# Patient Record
Sex: FEMALE | Race: BLACK OR AFRICAN AMERICAN | NOT HISPANIC OR LATINO | Employment: UNEMPLOYED | URBAN - METROPOLITAN AREA
[De-identification: names, ages, dates, MRNs, and addresses within clinical notes are randomized per-mention and may not be internally consistent; named-entity substitution may affect disease eponyms.]

---

## 2017-01-11 ENCOUNTER — TELEPHONE (OUTPATIENT)
Dept: FAMILY MEDICINE | Facility: CLINIC | Age: 52
End: 2017-01-11

## 2017-01-11 NOTE — TELEPHONE ENCOUNTER
----- Message from Karla Sargent MA sent at 1/11/2017  3:28 PM CST -----  Contact: [phone] / self       ----- Message -----     From: Lilo Laura     Sent: 1/11/2017  12:38 PM       To: Mindy Crane Staff    Patient requesting to speak with you regarding having synthroid moved from tier 3 to tier 2 copay.    [employer]: [phone]    Member Id [id number]    Please advise.

## 2017-02-06 DIAGNOSIS — E03.9 HYPOTHYROIDISM: ICD-10-CM

## 2017-02-06 RX ORDER — LEVOTHYROXINE SODIUM 150 MCG
TABLET ORAL
Qty: 90 TABLET | Refills: 0 | Status: SHIPPED | OUTPATIENT
Start: 2017-02-06 | End: 2017-06-04 | Stop reason: SDUPTHER

## 2017-02-15 ENCOUNTER — TELEPHONE (OUTPATIENT)
Dept: FAMILY MEDICINE | Facility: CLINIC | Age: 52
End: 2017-02-15

## 2017-02-15 NOTE — TELEPHONE ENCOUNTER
----- Message from Jolie Roger sent at 2/15/2017  4:11 PM CST -----  Contact: Self [phone]  Patient is calling to talk to nurse concerning her medication. Please advice

## 2017-02-15 NOTE — TELEPHONE ENCOUNTER
The patient stated the synthroid wasn't covered. I called the insurance company and they stated she picked it up 02/06/2017 with a 3 month supply. Called Jenny [phone]. Called patient back to inform her. She stated she didn't pick it up. I advised her to call them.

## 2017-02-15 NOTE — TELEPHONE ENCOUNTER
----- Message from Jolie Roger sent at 2/15/2017  4:11 PM CST -----  Contact: Self [phone]  Patient is calling to talk to nurse concerning her medication. Please advice

## 2017-02-15 NOTE — TELEPHONE ENCOUNTER
Returned patients call. Patient stated her insurance will not cover the synthroid. It needs a prior authorization. I informed her that I will call them.       Jenny: [phone]     Member Id [id number]    I called the insurance company

## 2017-02-16 ENCOUNTER — TELEPHONE (OUTPATIENT)
Dept: FAMILY MEDICINE | Facility: CLINIC | Age: 52
End: 2017-02-16

## 2017-02-16 NOTE — TELEPHONE ENCOUNTER
Returned patient's call.  She stated she called her insurance. She was told That I need to call back the insurance tiering section for synthroid to lower the price.   Member Id [id number]  [phone]    Spoke with the insurance company. She said she is trying to get the copay approved to $40.00,. It has been approved for 36 months. Its too soon to be filled again this month. Try and contact members services to be reimbursed.    Called patient to inform her of the change. Patient verbalized understanding

## 2017-02-16 NOTE — TELEPHONE ENCOUNTER
----- Message from Jolie Roger sent at 2/15/2017  4:52 PM CST -----  Contact: Self [phone]  Patient Returning Your Phone Call

## 2017-03-29 ENCOUNTER — TELEPHONE (OUTPATIENT)
Dept: FAMILY MEDICINE | Facility: CLINIC | Age: 52
End: 2017-03-29

## 2017-03-29 NOTE — TELEPHONE ENCOUNTER
Patient states she missed lab appointment, when rescheduling patient there was no orders. Patient states she getting labs for A1C and thyroid.

## 2017-03-29 NOTE — TELEPHONE ENCOUNTER
----- Message from Miriam Madrid sent at 3/29/2017  2:44 PM CDT -----  Contact: [phone]  Called patient to confirm appointment on tomorrow , says missed lab appointment , would like to go get them done tomorrow morning,please call her #[phone].Thanks

## 2017-03-30 ENCOUNTER — OFFICE VISIT (OUTPATIENT)
Dept: FAMILY MEDICINE | Facility: CLINIC | Age: 52
End: 2017-03-30
Payer: COMMERCIAL

## 2017-03-30 VITALS
OXYGEN SATURATION: 99 % | SYSTOLIC BLOOD PRESSURE: 132 MMHG | RESPIRATION RATE: 18 BRPM | DIASTOLIC BLOOD PRESSURE: 72 MMHG | BODY MASS INDEX: 54.47 KG/M2 | WEIGHT: 293 LBS | TEMPERATURE: 98 F | HEART RATE: 71 BPM

## 2017-03-30 DIAGNOSIS — Z00.00 ANNUAL PHYSICAL EXAM: Primary | ICD-10-CM

## 2017-03-30 DIAGNOSIS — M15.9 PRIMARY OSTEOARTHRITIS INVOLVING MULTIPLE JOINTS: ICD-10-CM

## 2017-03-30 DIAGNOSIS — E03.9 HYPOTHYROIDISM, UNSPECIFIED TYPE: ICD-10-CM

## 2017-03-30 PROCEDURE — 1160F RVW MEDS BY RX/DR IN RCRD: CPT | Mod: S$GLB,,, | Performed by: FAMILY MEDICINE

## 2017-03-30 PROCEDURE — 99999 PR PBB SHADOW E&M-EST. PATIENT-LVL III: CPT | Mod: PBBFAC,,, | Performed by: FAMILY MEDICINE

## 2017-03-30 PROCEDURE — 99214 OFFICE O/P EST MOD 30 MIN: CPT | Mod: S$GLB,,, | Performed by: FAMILY MEDICINE
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
## 2017-03-30 NOTE — MR AVS SNAPSHOT
[facility]  [address]  [place] [address]  Phone: [phone]  Fax: [phone]                  Radha Mike   3/30/2017 1:20 PM   Office Visit    Description:  Female : 1965   Provider:  Melanie Orlando MD   Department:  [facility]           Reason for Visit     Establish Care           Diagnoses this Visit        Comments    Annual physical exam    -  Primary     BMI 50.0-59.9, adult         Hypothyroidism, unspecified type         Primary osteoarthritis involving multiple joints                To Do List           Future Appointments        Provider Department Dept Phone    2017 7:40 AM LAB, [place] [facility] [phone]    2017 8:00 AM [facility] XR1 [facility] [phone]    2017 1:20 PM Melanie Orlando MD [facility] [phone]      Goals (5 Years of Data)     None      [facility]     [facility] Nurse Care Line -  Assistance  Unless otherwise directed by your provider, please contact [facility], our nurse care line that is available for  assistance.     Registered nurses in the [facility] provide: appointment scheduling, clinical advisement, health education, and other advisory services.  Call: [phone] (toll free)               Medications           Message regarding Medications     Verify the changes and/or additions to your medication regime listed below are the same as discussed with your clinician today.  If any of these changes or additions are incorrect, please notify your healthcare provider.             Verify that the below list of medications is an accurate representation of the medications you are currently taking.  If none reported, the list may be blank. If incorrect, please contact your healthcare provider. Carry this list with you in case of emergency.           Current Medications     levothyroxine (SYNTHROID) 150  MCG tablet Take 1 tablet (150 mcg total) by mouth once daily.    naproxen sodium (ANAPROX) 220 MG tablet Take 220 mg by mouth every 12 (twelve) hours.    SYNTHROID 150 mcg tablet TAKE 1 TABLET BY MOUTH EVERY DAY           Clinical Reference Information           Your Vitals Were     BP Pulse Temp Resp Weight Last Period    132/72 (BP Location: Right arm, Patient Position: Sitting, BP Method: Manual) 71 97.7 °F (36.5 °C) (Oral) 18 162.5 kg (358 lb 4 oz) 03/30/2017    SpO2 BMI             99% 54.47 kg/m2         Blood Pressure          Most Recent Value    BP  132/72      Allergies as of 3/30/2017     Codeine    Oxycodone-acetaminophen      Immunizations Administered on Date of Encounter - 3/30/2017     None      Orders Placed During Today's Visit      Normal Orders This Visit    POCT Urinalysis     Future Labs/Procedures Expected by Expires    CBC auto differential  3/30/2017 5/29/2018    Comprehensive metabolic panel  3/30/2017 5/29/2018    Lipid panel  3/30/2017 5/29/2018    TSH  3/30/2017 5/29/2018    Vitamin D  3/30/2017 5/29/2018    X-Ray Lumbar Spine AP And Lateral  3/30/2017 3/30/2018      Language Assistance Services     ATTENTION: Language assistance services are available, free of charge. Please call [phone].      ATENCIÓN: Si habla анна, tiene a kathleen disposición servicios gratuitos de asistencia lingüística. Llame al [phone].     CHÚ Ý: N?u b?n nói Ti?ng Vi?t, có các d?ch v? h? tr? ngôn ng? mi?n phí dành cho b?n. G?i s? [phone].         [facility] complies with applicable Federal civil rights laws and does not discriminate on the basis of race, color, national origin, age, disability, or sex.

## 2017-03-30 NOTE — TELEPHONE ENCOUNTER
----- Message from Sarah Meraz sent at 3/30/2017  8:24 AM CDT -----  Contact: [phone]  Patient states she is at [facility] waiting for orders, patient states she spoke with you on 03-29-17

## 2017-04-06 ENCOUNTER — HOSPITAL ENCOUNTER (OUTPATIENT)
Dept: RADIOLOGY | Facility: HOSPITAL | Age: 52
Discharge: HOME | End: 2017-04-06
Attending: FAMILY MEDICINE
Payer: COMMERCIAL

## 2017-04-06 DIAGNOSIS — M15.9 PRIMARY OSTEOARTHRITIS INVOLVING MULTIPLE JOINTS: ICD-10-CM

## 2017-04-06 DIAGNOSIS — Z00.00 ANNUAL PHYSICAL EXAM: ICD-10-CM

## 2017-04-06 PROCEDURE — 72100 X-RAY EXAM L-S SPINE 2/3 VWS: CPT | Mod: TC,PO

## 2017-04-13 ENCOUNTER — OFFICE VISIT (OUTPATIENT)
Dept: FAMILY MEDICINE | Facility: CLINIC | Age: 52
End: 2017-04-13
Payer: COMMERCIAL

## 2017-04-13 VITALS
HEART RATE: 72 BPM | DIASTOLIC BLOOD PRESSURE: 78 MMHG | SYSTOLIC BLOOD PRESSURE: 140 MMHG | RESPIRATION RATE: 20 BRPM | BODY MASS INDEX: 44.41 KG/M2 | HEIGHT: 68 IN | WEIGHT: 293 LBS | TEMPERATURE: 98 F

## 2017-04-13 DIAGNOSIS — N92.1 MENORRHAGIA WITH IRREGULAR CYCLE: ICD-10-CM

## 2017-04-13 DIAGNOSIS — E03.9 HYPOTHYROIDISM, UNSPECIFIED TYPE: ICD-10-CM

## 2017-04-13 DIAGNOSIS — E55.9 VITAMIN D DEFICIENCY: ICD-10-CM

## 2017-04-13 DIAGNOSIS — D50.0 IRON DEFICIENCY ANEMIA DUE TO CHRONIC BLOOD LOSS: Primary | ICD-10-CM

## 2017-04-13 PROCEDURE — 99999 PR PBB SHADOW E&M-EST. PATIENT-LVL III: CPT | Mod: PBBFAC,,, | Performed by: FAMILY MEDICINE

## 2017-04-13 PROCEDURE — 99214 OFFICE O/P EST MOD 30 MIN: CPT | Mod: S$GLB,,, | Performed by: FAMILY MEDICINE

## 2017-04-13 PROCEDURE — 1160F RVW MEDS BY RX/DR IN RCRD: CPT | Mod: S$GLB,,, | Performed by: FAMILY MEDICINE

## 2017-04-13 RX ORDER — ERGOCALCIFEROL 1.25 MG/1
50000 CAPSULE ORAL
Qty: 8 CAPSULE | Refills: 0 | Status: SHIPPED | OUTPATIENT
Start: 2017-04-13 | End: 2017-06-05 | Stop reason: SDUPTHER

## 2017-04-13 NOTE — MR AVS SNAPSHOT
[facility]  [address]  [place] [address]  Phone: [phone]  Fax: [phone]                  Radha Mike   2017 1:20 PM   Office Visit    Description:  Female : 1965   Provider:  Melanie Orlando MD   Department:  [facility]           Reason for Visit     Follow-up           Diagnoses this Visit        Comments    Iron deficiency anemia due to chronic blood loss    -  Primary     Hypothyroidism, unspecified type         BMI 50.0-59.9, adult         Vitamin D deficiency                To Do List           Future Appointments        Provider Department Dept Phone    2017 10:45 AM Vivi Hackett MD [facility] [phone]    2017 1:00 PM Melanie Orlando MD [place] [phone]      Goals (5 Years of Data)     None       These Medications        Disp Refills Start End    ferrous gluconate (FERGON) 325 MG Tab 90 tablet 2 2017     Take 1 tablet (325 mg total) by mouth 3 (three) times daily with meals. - Oral    Pharmacy: [facility] [address] [place] [address] [facility] Ph #: [phone]       ergocalciferol (ERGOCALCIFEROL) 50,000 unit Cap 8 capsule 0 2017    Take 1 capsule (50,000 Units total) by mouth every 7 days. - Oral    Pharmacy: [facility] [address] [place] [address] [facility] Ph #: [phone]         [facility]     [facility] Care [facility] -  Assistance  Unless otherwise directed by your provider, please contact [facility], our nurse care line that is available for  assistance.     Registered nurses in the [facility] provide: appointment scheduling, clinical advisement, health education, and other advisory services.  Call: [phone] (toll free)               Medications           Message regarding Medications     Verify the changes and/or  "additions to your medication regime listed below are the same as discussed with your clinician today.  If any of these changes or additions are incorrect, please notify your healthcare provider.        START taking these NEW medications        Refills    ferrous gluconate (FERGON) 325 MG Tab 2    Sig: Take 1 tablet (325 mg total) by mouth 3 (three) times daily with meals.    Class: Normal    Route: Oral    ergocalciferol (ERGOCALCIFEROL) 50,000 unit Cap 0    Sig: Take 1 capsule (50,000 Units total) by mouth every 7 days.    Class: Normal    Route: Oral      STOP taking these medications     naproxen sodium (ANAPROX) 220 MG tablet Take 220 mg by mouth every 12 (twelve) hours.           Verify that the below list of medications is an accurate representation of the medications you are currently taking.  If none reported, the list may be blank. If incorrect, please contact your healthcare provider. Carry this list with you in case of emergency.           Current Medications     ergocalciferol (ERGOCALCIFEROL) 50,000 unit Cap Take 1 capsule (50,000 Units total) by mouth every 7 days.    ferrous gluconate (FERGON) 325 MG Tab Take 1 tablet (325 mg total) by mouth 3 (three) times daily with meals.    SYNTHROID 150 mcg tablet TAKE 1 TABLET BY MOUTH EVERY DAY           Clinical Reference Information           Your Vitals Were     BP Pulse Temp Resp Height Weight    140/78 72 98.3 °F (36.8 °C) 20 5' 8" (1.727 m) 163 kg (359 lb 5.6 oz)    Last Period BMI             03/30/2017 54.64 kg/m2         Blood Pressure          Most Recent Value    BP  (!)  140/78      Allergies as of 4/13/2017     Codeine    Oxycodone-acetaminophen      Immunizations Administered on Date of Encounter - 4/13/2017     None      Orders Placed During Today's Visit      Normal Orders This Visit    Ambulatory Referral to Obstetrics / Gynecology       Language Assistance Services     ATTENTION: Language assistance services are available, free of charge. " Please call [phone].      ATENCIÓN: Si habla español, tiene a kathleen disposición servicios gratuitos de asistencia lingüística. Llame al [phone].     CHÚ Ý: N?u b?n nói Ti?ng Vi?t, có các d?ch v? h? tr? ngôn ng? mi?n phí dành cho b?n. G?i s? [phone].         [facility] complies with applicable Federal civil rights laws and does not discriminate on the basis of race, color, national origin, age, disability, or sex.

## 2017-04-13 NOTE — PROGRESS NOTES
"HPI:  Radha Mike is a 51 y.o. year old female that  presents with for f/u of test result.She reports that she has had vaginal bleeding daily for a least one year.She had it evaluated  And was determined not to be cancer but no defined treatment was started.  Chief Complaint   Patient presents with    Follow-up     Here for test results.   .     HPI      Past Medical History:   Diagnosis Date    Arthritis     Psoriasis     Thyroid disease      Social History     Social History    Marital status:      Spouse name: N/A    Number of children: N/A    Years of education: N/A     Occupational History     [employer]     Social History Main Topics    Smoking status: Never Smoker    Smokeless tobacco: Not on file    Alcohol use Yes      Comment: occasional    Drug use: No    Sexual activity: Yes     Partners: Male     Birth control/ protection: Surgical     Other Topics Concern    Not on file     Social History Narrative    No narrative on file     Past Surgical History:   Procedure Laterality Date    ANKLE DEBRIDEMENT  2005    CHOLECYSTECTOMY      GALLBLADDER SURGERY  1988    TUBAL LIGATION  1996     Family History   Problem Relation Age of Onset    Diabetes Mother     Breast cancer Mother     Heart attack Mother     Heart disease Mother      CHF    Diabetes Father     Cancer Father      mouth    Stomach cancer Father     Arthritis Sister     No Known Problems Brother     No Known Problems Daughter     Hypothyroidism Son     Diabetes Sister            Review of Systems  General ROS: negative for chills, fever or weight loss  ENT ROS: negative for epistaxis, sore throat or rhinorrhea  Respiratory ROS: no cough, shortness of breath, or wheezing  Cardiovascular ROS: no chest pain or dyspnea on exertion  Gastrointestinal ROS: no abdominal pain, change in bowel habits, or black/ bloody stools    Physical Exam:  BP (!) 140/78  Pulse 72  Temp 98.3 °F (36.8 °C)  Resp 20  Ht 5' 8" (1.727 m) "  Wt (!) 163 kg (359 lb 5.6 oz)  LMP 03/30/2017  BMI 54.64 kg/m2  General appearance: alert, cooperative, no distress  Constitutional:Oriented to person, place, and time.appears well-developed and well-nourished.  HEENT: Normocephalic, atraumatic, neck symmetrical, no nasal discharge, TM- clear bilaterally  Lungs: clear to auscultation bilaterally, no dullness to percussion bilaterally  Heart: regular rate and rhythm without rub; no displacement of the PMI , S1&S2 present  Abdomen: soft, non-tender; bowel sounds normoactive; no organomegaly  Physical Exam    LABS:    Complete Blood Count  Lab Results   Component Value Date    RBC 5.06 04/06/2017    HGB 11.3 (L) 04/06/2017    HCT 35.9 (L) 04/06/2017    MCV 71 (L) 04/06/2017    MCH 22.3 (L) 04/06/2017    MCHC 31.5 (L) 04/06/2017    RDW 17.4 (H) 04/06/2017     04/06/2017    MPV 12.0 04/06/2017    GRAN 6.3 04/06/2017    GRAN 60.6 04/06/2017    LYMPH 2.9 04/06/2017    LYMPH 27.4 04/06/2017    MONO 0.9 04/06/2017    MONO 8.3 04/06/2017    EOS 0.3 04/06/2017    BASO 0.03 04/06/2017    EOSINOPHIL 3.1 04/06/2017    BASOPHIL 0.3 04/06/2017    DIFFMETHOD Automated 04/06/2017       Comprehensive Metabolic Panel  Lab Results   Component Value Date     04/06/2017    BUN 14 04/06/2017    CREATININE 0.82 04/06/2017     04/06/2017    K 4.0 04/06/2017     04/06/2017    PROT 7.8 04/06/2017    ALBUMIN 4.1 04/06/2017    BILITOT 0.6 04/06/2017    AST 19 04/06/2017    ALKPHOS 83 04/06/2017    CO2 29 04/06/2017    ALT 21 04/06/2017    ANIONGAP 10 04/06/2017    EGFRNONAA >60.0 04/06/2017    ESTGFRAFRICA >60.0 04/06/2017       LIPID  No components found for: LIPID PROFILE    TSH  Lab Results   Component Value Date    TSH 1.372 04/06/2017         Assessment:    ICD-10-CM ICD-9-CM    1. Iron deficiency anemia due to chronic blood loss D50.0 280.0 ferrous gluconate (FERGON) 325 MG Tab      Ambulatory Referral to Obstetrics / Gynecology   2. Hypothyroidism,  unspecified type E03.9 244.9    3. BMI 50.0-59.9, adult Z68.43 V85.43    4. Vitamin D deficiency E55.9 268.9 ergocalciferol (ERGOCALCIFEROL) 50,000 unit Cap   5. Menorrhagia with irregular cycle N92.1 626.2 Ambulatory Referral to Obstetrics / Gynecology         Plan:    Return in 6 weeks (on 5/25/2017).          Melanie Orlando MD

## 2017-05-11 ENCOUNTER — OFFICE VISIT (OUTPATIENT)
Dept: OBSTETRICS AND GYNECOLOGY | Facility: CLINIC | Age: 52
End: 2017-05-11
Payer: COMMERCIAL

## 2017-05-11 VITALS
BODY MASS INDEX: 44.41 KG/M2 | SYSTOLIC BLOOD PRESSURE: 120 MMHG | HEIGHT: 68 IN | WEIGHT: 293 LBS | DIASTOLIC BLOOD PRESSURE: 70 MMHG

## 2017-05-11 DIAGNOSIS — N88.8 CERVICAL MASS: ICD-10-CM

## 2017-05-11 DIAGNOSIS — N93.9 ABNORMAL UTERINE BLEEDING (AUB): Primary | ICD-10-CM

## 2017-05-11 DIAGNOSIS — Z12.4 SCREENING FOR CERVICAL CANCER: ICD-10-CM

## 2017-05-11 PROCEDURE — 99999 PR PBB SHADOW E&M-EST. PATIENT-LVL III: CPT | Mod: PBBFAC,,, | Performed by: OBSTETRICS & GYNECOLOGY

## 2017-05-11 PROCEDURE — 87624 HPV HI-RISK TYP POOLED RSLT: CPT

## 2017-05-11 PROCEDURE — 99213 OFFICE O/P EST LOW 20 MIN: CPT | Mod: S$GLB,,, | Performed by: OBSTETRICS & GYNECOLOGY

## 2017-05-11 PROCEDURE — 88141 CYTOPATH C/V INTERPRET: CPT | Mod: ,,, | Performed by: PATHOLOGY

## 2017-05-11 PROCEDURE — 88175 CYTOPATH C/V AUTO FLUID REDO: CPT | Performed by: PATHOLOGY

## 2017-05-11 PROCEDURE — 1160F RVW MEDS BY RX/DR IN RCRD: CPT | Mod: S$GLB,,, | Performed by: OBSTETRICS & GYNECOLOGY

## 2017-05-11 NOTE — PROGRESS NOTES
GYNECOLOGY OFFICE NOTE    Reason for visit: consult for AUB    HPI: Pt is a 51 y.o.  female  who presents for evaluation of AUB. Cycle: menarche- 7, Interval- Q month but for the past year has been variable. Some spotting every day and occasionally passing clots for the past yr. Changes pad every 2 hr on heavy days then goes back to light spotting. Denies dysmenorrhea. Pt saw Dr. Wiedemann 3/2015 for irregular bleeding. Pap was unsatisfactory and EMBX was performed that showed simple hyperplasia without atypia. Pt was given a rx for provera for 12 days and repeat EMBX 4 months later was negative for metaplasia or dysplasia. Pt was instructed to retake provera if bleeding continued. Pt was also noted to have a small endocervical polyp at that time.  She is sexually active.  She uses bilateral tubal ligation for contraception.      Past Medical History:   Diagnosis Date    Arthritis     Psoriasis     Thyroid disease        Past Surgical History:   Procedure Laterality Date    ANKLE DEBRIDEMENT      CHOLECYSTECTOMY      GALLBLADDER SURGERY      TUBAL LIGATION         Family History   Problem Relation Age of Onset    Diabetes Mother     Breast cancer Mother     Heart attack Mother     Heart disease Mother      CHF    Diabetes Father     Cancer Father      mouth    Stomach cancer Father     Arthritis Sister     No Known Problems Brother     No Known Problems Daughter     Hypothyroidism Son     Diabetes Sister        Social History   Substance Use Topics    Smoking status: Never Smoker    Smokeless tobacco: None    Alcohol use Yes      Comment: occasional       OB History    Para Term  AB SAB TAB Ectopic Multiple Living   2 2 2       2      # Outcome Date GA Lbr Brian/2nd Weight Sex Delivery Anes PTL Lv   2 Term      Vag-Spont      1 Term      Vag-Spont             Current Outpatient Prescriptions   Medication Sig    ergocalciferol (ERGOCALCIFEROL) 50,000 unit  "Cap Take 1 capsule (50,000 Units total) by mouth every 7 days.    ferrous gluconate (FERGON) 325 MG Tab Take 1 tablet (325 mg total) by mouth 3 (three) times daily with meals.    SYNTHROID 150 mcg tablet TAKE 1 TABLET BY MOUTH EVERY DAY     No current facility-administered medications for this visit.        Allergies: Codeine and Oxycodone-acetaminophen     /70  Ht 5' 8" (1.727 m)  Wt (!) 162 kg (357 lb 2.3 oz)  BMI 54.3 kg/m2    ROS:  GENERAL: Denies fever or chills.   SKIN: Denies rash or lesions.   HEAD: Denies head injury or headache.   CHEST: Denies chest pain or shortness of breath.   CARDIOVASCULAR: Denies palpitations or chest pain.   ABDOMEN: No abdominal pain, constipation, diarrhea, nausea, vomiting or rectal bleeding.   URINARY: No dysuria, hematuria, or burning on urination.  REPRODUCTIVE: See HPI.   BREASTS: Denies pain, lumps, or nipple discharge.   NEUROLOGIC: Denies syncope or weakness.     Physical Exam:  GENERAL: alert, appears stated age and cooperative  CHEST: Normal respiratory effort  HEART: S1 and S2 normal, regular rate and rhythm  NECK: normal appearance, no thyromegaly masses or tenderness  SKIN: no acne, striae, hirsutism  ABDOMEN: abdomen is soft without significant tenderness, masses, organomegaly or guarding, no hernias noted  EXTERNAL GENITALIA:  normal general appearance  URETHRA: normal urethra, normal urethral meatus  VAGINA:  normal mucosa without prolapse or lesions  CERVIX:  Cervical mass ~3.5cm protruding from cervical os- attempt to grasp with ringed forceps but unable to remove. Some minor bleeding noted. (will add pt on to schedule tomorrow in [place] for colposcopy and EMBX)- discussed potential pathology  UTERUS/ADNEXA:  exam limited by habitus unable to feel. Rectovaginal exam normal    Diagnosis:  1. Abnormal uterine bleeding (AUB)    2. Cervical mass    3. Screening for cervical cancer        Plan:   1/2. AUB with cervical mass needs biopsy (no biopsy forceps " in clinic)- will send for stat pathology. Discussed possibility of cervical polyp with necrosis versus cancer/hyperplasia. Pt voiced understanding.   3. Pap/hpv today    Orders Placed This Encounter    HPV High Risk Genotypes, PCR    Liquid-based pap smear, screening    US OB/GYN Procedure ([facility])       Patient was counseled today on the new ACS guidelines for cervical cytology screening as well as the current recommendations for breast cancer screening. She was counseled to follow up with her PCP for other routine health maintenance.     Pt to be seen tomorrow morning for biopsy.     Vivi Hackett MD  OB/GYN  Pager: [phone]

## 2017-05-11 NOTE — LETTER
May 11, 2017      Melanie Orlando MD  [address] [address]  [address]  [facility] [address]           [place] [facility]  [address]  [facility] [address]  Phone: [phone]          Patient: Radha Mike   MR Number: [id number]   YOB: 1965   Date of Visit: 5/11/2017       Dear Dr. Melanie Orlando:    Thank you for referring Radha Mike to me for evaluation. Attached you will find relevant portions of my assessment and plan of care.    If you have questions, please do not hesitate to call me. I look forward to following Radha Mike along with you.    Sincerely,    Vivi Hackett MD    Enclosure  CC:  No Recipients    If you would like to receive this communication electronically, please contact [email or web address] or [phone] to request more information on [facility] [facility] [employer] Link access.    For providers and/or their staff who would like to refer a patient to [facility], please contact us through our one-stop-shop provider referral line, [facility] , at [phone].    If you feel you have received this communication in error or would no longer like to receive these types of communications, please e-mail [email or web address]

## 2017-05-11 NOTE — MR AVS SNAPSHOT
[place]  ANGELGYMICHEL  [id number] [place] [address] [address]  [facility] [address]  Phone: [phone]                  Radha Mike   2017 10:45 AM   Office Visit    Description:  Female : 1965   Provider:  Vivi Hackett MD   Department:  [facility]           Reason for Visit     Consult           Diagnoses this Visit        Comments    Abnormal uterine bleeding (AUB)    -  Primary     Cervical mass         Screening for cervical cancer                To Do List           Future Appointments        Provider Department Dept Phone    2017 10:45 AM MD Asad Feng - [phone]    2017 1:00 PM ULTRASOUND, ASAD Toure [phone]    2017 1:00 PM Melanie Orlando MD [facility] [phone]      Goals (5 Years of Data)     None      [facility]     [facility] Nurse Care Line -  Assistance  Unless otherwise directed by your provider, please contact [facility], our nurse care line that is available for  assistance.     Registered nurses in the [facility] provide: appointment scheduling, clinical advisement, health education, and other advisory services.  Call: [phone] (toll free)               Medications           Message regarding Medications     Verify the changes and/or additions to your medication regime listed below are the same as discussed with your clinician today.  If any of these changes or additions are incorrect, please notify your healthcare provider.             Verify that the below list of medications is an accurate representation of the medications you are currently taking.  If none reported, the list may be blank. If incorrect, please contact your healthcare provider. Carry this list with you in case of emergency.           Current Medications     ergocalciferol (ERGOCALCIFEROL) 50,000 unit Cap Take 1 capsule (50,000 Units total) by mouth every 7 days.    ferrous gluconate (FERGON)  "325 MG Tab Take 1 tablet (325 mg total) by mouth 3 (three) times daily with meals.    SYNTHROID 150 mcg tablet TAKE 1 TABLET BY MOUTH EVERY DAY           Clinical Reference Information           Your Vitals Were     BP Height Weight BMI       120/70 5' 8" (1.727 m) 162 kg (357 lb 2.3 oz) 54.3 kg/m2       Blood Pressure          Most Recent Value    BP  120/70      Allergies as of 5/11/2017     Codeine    Oxycodone-acetaminophen      Immunizations Administered on Date of Encounter - 5/11/2017     None      Orders Placed During Today's Visit      Normal Orders This Visit    HPV High Risk Genotypes, PCR     Liquid-based pap smear, screening     Future Labs/Procedures Expected by Expires    US OB/GYN Procedure ([facility])  As directed 5/12/2018      Language Assistance Services     ATTENTION: Language assistance services are available, free of charge. Please call [phone].      ATENCIÓN: Si christiano jj, tiene a kathleen disposición servicios gratuitos de asistencia lingüística. Llame al [phone].     JILLIAN Ý: N?u b?n nói Ti?ng Vi?t, có các d?ch v? h? tr? ngôn ng? mi?n phí dành cho b?n. G?i s? [phone].         [facility] complies with applicable Federal civil rights laws and does not discriminate on the basis of race, color, national origin, age, disability, or sex.        "

## 2017-05-12 ENCOUNTER — APPOINTMENT (OUTPATIENT)
Dept: LAB | Facility: HOSPITAL | Age: 52
End: 2017-05-12
Attending: OBSTETRICS & GYNECOLOGY
Payer: COMMERCIAL

## 2017-05-12 ENCOUNTER — OFFICE VISIT (OUTPATIENT)
Dept: OBSTETRICS AND GYNECOLOGY | Facility: CLINIC | Age: 52
End: 2017-05-12
Payer: COMMERCIAL

## 2017-05-12 VITALS
DIASTOLIC BLOOD PRESSURE: 60 MMHG | SYSTOLIC BLOOD PRESSURE: 110 MMHG | HEIGHT: 68 IN | WEIGHT: 293 LBS | BODY MASS INDEX: 44.41 KG/M2

## 2017-05-12 DIAGNOSIS — N93.9 ABNORMAL UTERINE BLEEDING (AUB): ICD-10-CM

## 2017-05-12 DIAGNOSIS — N93.9 ABNORMAL UTERINE BLEEDING: ICD-10-CM

## 2017-05-12 DIAGNOSIS — D21.9 FIBROIDS: Primary | ICD-10-CM

## 2017-05-12 DIAGNOSIS — N88.8 CERVICAL MASS: ICD-10-CM

## 2017-05-12 DIAGNOSIS — N88.8 CERVICAL MASS: Primary | ICD-10-CM

## 2017-05-12 DIAGNOSIS — E66.01 MORBID OBESITY: ICD-10-CM

## 2017-05-12 PROCEDURE — 88305 TISSUE EXAM BY PATHOLOGIST: CPT | Performed by: PATHOLOGY

## 2017-05-12 PROCEDURE — 88305 TISSUE EXAM BY PATHOLOGIST: CPT | Mod: 26,,, | Performed by: PATHOLOGY

## 2017-05-12 PROCEDURE — 81025 URINE PREGNANCY TEST: CPT | Mod: QW,S$GLB,, | Performed by: OBSTETRICS & GYNECOLOGY

## 2017-05-12 PROCEDURE — 58100 BIOPSY OF UTERUS LINING: CPT | Mod: 51,S$GLB,, | Performed by: OBSTETRICS & GYNECOLOGY

## 2017-05-12 PROCEDURE — 99999 PR PBB SHADOW E&M-EST. PATIENT-LVL II: CPT | Mod: PBBFAC,,, | Performed by: OBSTETRICS & GYNECOLOGY

## 2017-05-12 PROCEDURE — 57500 BIOPSY OF CERVIX: CPT | Mod: S$GLB,,, | Performed by: OBSTETRICS & GYNECOLOGY

## 2017-05-12 PROCEDURE — 76830 TRANSVAGINAL US NON-OB: CPT | Mod: S$GLB,,, | Performed by: OBSTETRICS & GYNECOLOGY

## 2017-05-12 PROCEDURE — 99499 UNLISTED E&M SERVICE: CPT | Mod: S$GLB,,, | Performed by: OBSTETRICS & GYNECOLOGY

## 2017-05-12 NOTE — PROGRESS NOTES
CERVICAL BIOPSY/ENDOMETRIAL BIOPSY:    Date: 17  Time: 1100    Radha Mike is a 51 y.o. female   presents for cervical biopsy of a 4cm protruding cervical mass from the os. Patient is currently having periods.  Her most recent pap smear is pending.      The abnormal test findings were discussed, as well as HPV infection, need for colposcopy and possible biopsies to determine the plan of care, treatments available, the minimal risk of bleeding and infection with colposcopy, and alternatives to colposcopy and she agrees to proceed.  :The patient was informed of the risk of bleeding, infection, uterine perforation and pain and that the test will rule-out endometrial cancer with accuracy greater than 95%. She was counseled on the alternatives to endometrial biopsy.     UPT is negative.    TIME OUT PERFORMED.     ANESTHESIA: None    The speculum was placed and cervix adequately visualized. 4 cm mass protruding from cervical os. Unable to grasp mass and remove with ring forceps without patient discomfort. 4 biopsies taken of the mass. The anterior lip of the cervix was not grasped with a single toothed tenaculum, and a sterile endometrial pipelle was inserted into the uterus to a sound length of 9 cm. 1 pass was made with the pipelle and moderate amount of tissue was obtained. The specimen was placed in formalin and sent to Pathology for evaluation. Will attempt to rush pathology. Hemostasis noted    COMPLICATIONS: None    DISPOSITION: The patient tolerated the above procedure well.    POST BIOPSY COUNSELING:  - Manage post biopsy cramping with NSAIDs or Tylenol.  - Expect spotting or light bleeding for a few days.  - Report bleeding heavier than a period, fever > 101.0 F, worsening pain or a foul smelling vaginal discharge.    Follow-up pending biopsy results  By next weeks      Vivi Hackett MD  OB/GYN  Pager: [phone]

## 2017-05-15 DIAGNOSIS — Z11.59 NEED FOR HEPATITIS C SCREENING TEST: Primary | ICD-10-CM

## 2017-05-16 LAB
HPV16 DNA SPEC QL NAA+PROBE: NEGATIVE
HPV16+18+H RISK 12 DNA CVX-IMP: NEGATIVE
HPV18 DNA SPEC QL NAA+PROBE: NEGATIVE

## 2017-05-17 ENCOUNTER — TELEPHONE (OUTPATIENT)
Dept: OBSTETRICS AND GYNECOLOGY | Facility: CLINIC | Age: 52
End: 2017-05-17

## 2017-05-17 NOTE — TELEPHONE ENCOUNTER
Yes i spoke with patient at the same time of sending her [employer] message about her results ~30 min ago. She voiced understanding.

## 2017-05-17 NOTE — TELEPHONE ENCOUNTER
----- Message from Naheed Hutchinson sent at 5/17/2017  3:46 PM CDT -----  Contact: [phone]/self  Pt states she's returning your call.  Please advise

## 2017-05-17 NOTE — TELEPHONE ENCOUNTER
Spoke with patient and she didn't know who called her. i will send a message to  to see if she called her.

## 2017-05-18 ENCOUNTER — TELEPHONE (OUTPATIENT)
Dept: OBSTETRICS AND GYNECOLOGY | Facility: CLINIC | Age: 52
End: 2017-05-18

## 2017-05-18 NOTE — TELEPHONE ENCOUNTER
Called to inform pt of biopsy results- negative for malignancy. Still recommend D&C and removal of cervical mass. Pt voiced understanding. I will have to work on getting surgery scheduled and will call pt back tomorrow.     Vivi Hackett MD  OB/GYN  Pager: [phone]

## 2017-05-24 ENCOUNTER — TELEPHONE (OUTPATIENT)
Dept: OBSTETRICS AND GYNECOLOGY | Facility: CLINIC | Age: 52
End: 2017-05-24

## 2017-05-24 NOTE — TELEPHONE ENCOUNTER
Called to notify pt that Dr. Barnes will be performing her surgery and that should get a call soon about a surgery date and preop appointments. Pt voiced understanding.     Vivi Hackett MD  OB/GYN  Pager: [phone]

## 2017-05-25 ENCOUNTER — TELEPHONE (OUTPATIENT)
Dept: ADMINISTRATIVE | Facility: OTHER | Age: 52
End: 2017-05-25

## 2017-05-25 ENCOUNTER — OFFICE VISIT (OUTPATIENT)
Dept: FAMILY MEDICINE | Facility: CLINIC | Age: 52
End: 2017-05-25
Payer: COMMERCIAL

## 2017-05-25 VITALS
DIASTOLIC BLOOD PRESSURE: 68 MMHG | SYSTOLIC BLOOD PRESSURE: 132 MMHG | RESPIRATION RATE: 18 BRPM | HEART RATE: 83 BPM | WEIGHT: 293 LBS | BODY MASS INDEX: 44.41 KG/M2 | OXYGEN SATURATION: 99 % | TEMPERATURE: 98 F | HEIGHT: 68 IN

## 2017-05-25 DIAGNOSIS — D50.0 IRON DEFICIENCY ANEMIA DUE TO CHRONIC BLOOD LOSS: Primary | ICD-10-CM

## 2017-05-25 DIAGNOSIS — E03.9 HYPOTHYROIDISM, UNSPECIFIED TYPE: ICD-10-CM

## 2017-05-25 DIAGNOSIS — N88.8 CERVICAL MASS: ICD-10-CM

## 2017-05-25 DIAGNOSIS — N93.9 ABNORMAL UTERINE BLEEDING (AUB): Primary | ICD-10-CM

## 2017-05-25 PROCEDURE — 99999 PR PBB SHADOW E&M-EST. PATIENT-LVL III: CPT | Mod: PBBFAC,,, | Performed by: FAMILY MEDICINE

## 2017-05-25 PROCEDURE — 99213 OFFICE O/P EST LOW 20 MIN: CPT | Mod: S$GLB,,, | Performed by: FAMILY MEDICINE

## 2017-05-25 NOTE — PROGRESS NOTES
HPI:lukeholliereggie Mike is a 51 y.o. year old female that  presents with f/u of anemia and mass found in her cervix, She is following up with KEON Hackett. Thus far test results have been benign.She is scheduled for D&C. She is taking cabouchi tea and feel it is helping her general health.  Chief Complaint   Patient presents with    Follow-up     iron deficiency, OB notes   .     HPI      Past Medical History:   Diagnosis Date    Arthritis     Psoriasis     Thyroid disease      Social History     Social History    Marital status:      Spouse name: N/A    Number of children: N/A    Years of education: N/A     Occupational History     [employer]     Social History Main Topics    Smoking status: Never Smoker    Smokeless tobacco: Not on file    Alcohol use Yes      Comment: occasional    Drug use: No    Sexual activity: Yes     Partners: Male     Birth control/ protection: Surgical     Other Topics Concern    Not on file     Social History Narrative    No narrative on file     Past Surgical History:   Procedure Laterality Date    ANKLE DEBRIDEMENT  2005    CHOLECYSTECTOMY      GALLBLADDER SURGERY  1988    TUBAL LIGATION  1996     Family History   Problem Relation Age of Onset    Diabetes Mother     Breast cancer Mother     Heart attack Mother     Heart disease Mother      CHF    Diabetes Father     Cancer Father      mouth    Stomach cancer Father     Arthritis Sister     No Known Problems Brother     No Known Problems Daughter     Hypothyroidism Son     Diabetes Sister            Review of Systems  General ROS: negative for chills, fever or weight loss  ENT ROS: negative for epistaxis, sore throat or rhinorrhea  Respiratory ROS: no cough, shortness of breath, or wheezing  Cardiovascular ROS: no chest pain or dyspnea on exertion  Gastrointestinal ROS: no abdominal pain, change in bowel habits, or black/ bloody stools    Physical Exam:  /68 (BP Location: Right arm,  "Patient Position: Sitting, BP Method: Manual)   Pulse 83   Temp 97.9 °F (36.6 °C) (Oral)   Resp 18   Ht 5' 8" (1.727 m)   Wt (!) 161.5 kg (356 lb 0.7 oz)   LMP 05/25/2017   SpO2 99%   BMI 54.14 kg/m²   General appearance: alert, cooperative, no distress  Constitutional:Oriented to person, place, and time.appears well-developed and well-nourished.  HEENT: Normocephalic, atraumatic, neck symmetrical, no nasal discharge, TM- clear bilaterally  Lungs: clear to auscultation bilaterally, no dullness to percussion bilaterally  Heart: regular rate and rhythm without rub; no displacement of the PMI , S1&S2 present  Abdomen: soft, non-tender; bowel sounds normoactive; no organomegaly  Physical Exam    LABS:    Complete Blood Count  Lab Results   Component Value Date    RBC 5.06 04/06/2017    HGB 11.3 (L) 04/06/2017    HCT 35.9 (L) 04/06/2017    MCV 71 (L) 04/06/2017    MCH 22.3 (L) 04/06/2017    MCHC 31.5 (L) 04/06/2017    RDW 17.4 (H) 04/06/2017     04/06/2017    MPV 12.0 04/06/2017    GRAN 6.3 04/06/2017    GRAN 60.6 04/06/2017    LYMPH 2.9 04/06/2017    LYMPH 27.4 04/06/2017    MONO 0.9 04/06/2017    MONO 8.3 04/06/2017    EOS 0.3 04/06/2017    BASO 0.03 04/06/2017    EOSINOPHIL 3.1 04/06/2017    BASOPHIL 0.3 04/06/2017    DIFFMETHOD Automated 04/06/2017       Comprehensive Metabolic Panel  Lab Results   Component Value Date     04/06/2017    BUN 14 04/06/2017    CREATININE 0.82 04/06/2017     04/06/2017    K 4.0 04/06/2017     04/06/2017    PROT 7.8 04/06/2017    ALBUMIN 4.1 04/06/2017    BILITOT 0.6 04/06/2017    AST 19 04/06/2017    ALKPHOS 83 04/06/2017    CO2 29 04/06/2017    ALT 21 04/06/2017    ANIONGAP 10 04/06/2017    EGFRNONAA >60.0 04/06/2017    ESTGFRAFRICA >60.0 04/06/2017       LIPID  Lab Results   Component Value Date    CHOL 137 04/06/2017    HDL 59 04/06/2017         TSH  Lab Results   Component Value Date    TSH 1.372 04/06/2017       Current Outpatient Prescriptions "   Medication Sig Dispense Refill    ergocalciferol (ERGOCALCIFEROL) 50,000 unit Cap Take 1 capsule (50,000 Units total) by mouth every 7 days. 8 capsule 0    SYNTHROID 150 mcg tablet TAKE 1 TABLET BY MOUTH EVERY DAY 90 tablet 0    ferrous gluconate (FERGON) 325 MG Tab Take 1 tablet (325 mg total) by mouth 3 (three) times daily with meals. 90 tablet 2     No current facility-administered medications for this visit.        Assessment:    ICD-10-CM ICD-9-CM    1. Iron deficiency anemia due to chronic blood loss D50.0 280.0    2. Hypothyroidism, unspecified type E03.9 244.9          Plan:    Return in 3 months (on 8/25/2017).  Will re-do lab in 3 month for anemia , licha d level, and TSH        Melanie Orlando MD

## 2017-06-04 DIAGNOSIS — E03.9 HYPOTHYROIDISM: ICD-10-CM

## 2017-06-04 RX ORDER — LEVOTHYROXINE SODIUM 150 MCG
TABLET ORAL
Qty: 90 TABLET | Refills: 0 | Status: SHIPPED | OUTPATIENT
Start: 2017-06-04 | End: 2018-04-12 | Stop reason: DRUGHIGH

## 2017-06-05 ENCOUNTER — TELEPHONE (OUTPATIENT)
Dept: FAMILY MEDICINE | Facility: CLINIC | Age: 52
End: 2017-06-05

## 2017-06-05 DIAGNOSIS — E55.9 VITAMIN D DEFICIENCY: ICD-10-CM

## 2017-06-05 RX ORDER — ERGOCALCIFEROL 1.25 MG/1
CAPSULE ORAL
Qty: 8 CAPSULE | Refills: 0 | Status: SHIPPED | OUTPATIENT
Start: 2017-06-05 | End: 2018-02-15

## 2017-06-05 NOTE — TELEPHONE ENCOUNTER
Patient states MD was supposed to write iron medication that won't constipate patient. Patient never received prescription.

## 2017-06-08 ENCOUNTER — HOSPITAL ENCOUNTER (OUTPATIENT)
Dept: PREADMISSION TESTING | Facility: HOSPITAL | Age: 52
Discharge: HOME | End: 2017-06-08
Attending: OBSTETRICS & GYNECOLOGY
Payer: COMMERCIAL

## 2017-06-08 ENCOUNTER — OFFICE VISIT (OUTPATIENT)
Dept: OBSTETRICS AND GYNECOLOGY | Facility: CLINIC | Age: 52
End: 2017-06-08
Payer: COMMERCIAL

## 2017-06-08 ENCOUNTER — CLINICAL SUPPORT (OUTPATIENT)
Dept: LAB | Facility: HOSPITAL | Age: 52
End: 2017-06-08
Attending: OBSTETRICS & GYNECOLOGY
Payer: COMMERCIAL

## 2017-06-08 ENCOUNTER — ANESTHESIA EVENT (OUTPATIENT)
Dept: SURGERY | Facility: HOSPITAL | Age: 52
End: 2017-06-08
Payer: COMMERCIAL

## 2017-06-08 VITALS
RESPIRATION RATE: 19 BRPM | OXYGEN SATURATION: 97 % | HEIGHT: 67 IN | BODY MASS INDEX: 45.99 KG/M2 | TEMPERATURE: 98 F | DIASTOLIC BLOOD PRESSURE: 72 MMHG | HEART RATE: 76 BPM | WEIGHT: 293 LBS | SYSTOLIC BLOOD PRESSURE: 139 MMHG

## 2017-06-08 VITALS
SYSTOLIC BLOOD PRESSURE: 132 MMHG | WEIGHT: 293 LBS | DIASTOLIC BLOOD PRESSURE: 84 MMHG | HEIGHT: 67 IN | BODY MASS INDEX: 45.99 KG/M2

## 2017-06-08 DIAGNOSIS — Z01.818 PRE-OP TESTING: Primary | ICD-10-CM

## 2017-06-08 DIAGNOSIS — Z53.1 REFUSAL OF BLOOD TRANSFUSIONS AS PATIENT IS JEHOVAH'S WITNESS: ICD-10-CM

## 2017-06-08 DIAGNOSIS — N88.8 CERVICAL MASS: ICD-10-CM

## 2017-06-08 DIAGNOSIS — Z01.818 PRE-OP TESTING: ICD-10-CM

## 2017-06-08 DIAGNOSIS — N84.1 ENDOCERVICAL POLYP: Primary | ICD-10-CM

## 2017-06-08 PROCEDURE — 93005 ELECTROCARDIOGRAM TRACING: CPT

## 2017-06-08 PROCEDURE — 93010 EKG 12-LEAD: ICD-10-PCS | Mod: S$GLB,,, | Performed by: INTERNAL MEDICINE

## 2017-06-08 PROCEDURE — 99999 PR PBB SHADOW E&M-EST. PATIENT-LVL II: CPT | Mod: PBBFAC,,, | Performed by: OBSTETRICS & GYNECOLOGY

## 2017-06-08 PROCEDURE — 93010 ELECTROCARDIOGRAM REPORT: CPT | Mod: S$GLB,,, | Performed by: INTERNAL MEDICINE

## 2017-06-08 PROCEDURE — 99499 UNLISTED E&M SERVICE: CPT | Mod: S$GLB,,, | Performed by: OBSTETRICS & GYNECOLOGY

## 2017-06-08 RX ORDER — SODIUM CHLORIDE, SODIUM LACTATE, POTASSIUM CHLORIDE, CALCIUM CHLORIDE 600; 310; 30; 20 MG/100ML; MG/100ML; MG/100ML; MG/100ML
INJECTION, SOLUTION INTRAVENOUS CONTINUOUS
Status: CANCELLED | OUTPATIENT
Start: 2017-06-08

## 2017-06-08 RX ORDER — LIDOCAINE HYDROCHLORIDE 10 MG/ML
1 INJECTION, SOLUTION EPIDURAL; INFILTRATION; INTRACAUDAL; PERINEURAL ONCE
Status: CANCELLED | OUTPATIENT
Start: 2017-06-08 | End: 2017-06-08

## 2017-06-08 NOTE — ANESTHESIA PREPROCEDURE EVALUATION
"                                                                                                             06/08/2017  Radha Miek is a 51 y.o., female is scheduled for D&C with excision of cervical mass under GETA on 6/12/2017.      PRIOR ANES (in Epic) 2017-06-12  None but describes failed epidudral for L&D    ANES-RELATED MED/SURG    Patient Active Problem List   Diagnosis    Hypothyroidism    BMI 50.0-59.9, adult    Refusal of blood transfusions as patient is Shinto     Past Medical History:   Diagnosis Date    Arthritis     Psoriasis     Thyroid disease      Past Surgical History:   Procedure Laterality Date    ANKLE DEBRIDEMENT  2005    CHOLECYSTECTOMY  1988    TUBAL LIGATION  1996     ALLERGIES  Review of patient's allergies indicates:   Allergen Reactions    Codeine Hives and Other (See Comments)     Other reaction(s): Hives  Stomach feels raw    Oxycodone-acetaminophen      Other reaction(s): Hives       ANES-RELATED HOME Rx  Fe Synthroid      Anesthesia Evaluation    I have reviewed the Patient Summary Reports.    I have reviewed the Nursing Notes.   I have reviewed the Medications.     Review of Systems  Anesthesia Hx:  Hx of Anesthetic complications  History of prior surgery of interest to airway management or planning: Previous anesthesia: General, MAC  colonoscopy with MAC.  Personal Hx of Anesthesia complications Difficult or Failed Neuraxial Anesthesia (during vaginal delivery; states epidural "did not work"  )  Social:  Non-Smoker, Social Alcohol Use    Hematology/Oncology:         -- Anemia (r/t abnormal uterine bleeding): Hematology Comments: Pt is Zoroastrian and declines the use of blood products   EENT/Dental:EENT/Dental Normal   Cardiovascular:   Exercise tolerance: good Denies Hypertension.  Denies Dysrhythmias.   Denies Angina.        Pulmonary:   Denies Shortness of breath.    Renal/:  Renal/ Normal     Hepatic/GI:   GERD, well controlled  "   OB/GYN/PEDS:  Heavy uterine bleeding off and on for approx 1 year   Neurological:  Neurology Normal    Endocrine:   Hypothyroidism      Wt Readings from Last 1 Encounters:   06/12/17 (!) 163.3 kg (360 lb)     Temp Readings from Last 1 Encounters:   06/12/17 36.7 °C (98.1 °F) (Oral)     BP Readings from Last 1 Encounters:   06/12/17 135/71     Pulse Readings from Last 1 Encounters:   06/12/17 89     SpO2 Readings from Last 1 Encounters:   06/12/17 99%       Physical Exam  General:  Morbid Obesity    Airway/Jaw/Neck:  Airway Findings: Mouth Opening: Small, but > 3cm Tongue: Normal  General Airway Assessment: Adult  Mallampati: III  TM Distance: Normal, at least 6 cm        Eyes/Ears/Nose:  EYES/EARS/NOSE FINDINGS: Normal   Dental:  Dental Findings: Periodontal disease, Mild   Chest/Lungs:  Chest/Lungs Clear    Heart/Vascular:  Heart Findings: Normal Heart murmur: negative    Abdomen:  Abdomen Findings: Normal      Mental Status:  Mental Status Findings: Normal      Lab Results   Component Value Date    WBC 9.91 06/08/2017    HGB 11.1 (L) 06/08/2017    HCT 35.2 (L) 06/08/2017    MCV 70 (L) 06/08/2017     (H) 06/08/2017       Chemistry        Component Value Date/Time     06/08/2017 1257    K 3.8 06/08/2017 1257     06/08/2017 1257    CO2 28 06/08/2017 1257    BUN 9 06/08/2017 1257    CREATININE 0.9 06/08/2017 1257    GLU 88 06/08/2017 1257        Component Value Date/Time    CALCIUM 9.4 06/08/2017 1257    ALKPHOS 83 04/06/2017 0750    AST 19 04/06/2017 0750    AST 19 03/04/2016 0830    ALT 21 04/06/2017 0750    BILITOT 0.6 04/06/2017 0750          Lab Results   Component Value Date    ALBUMIN 4.1 04/06/2017     Lab Results   Component Value Date    TSH 1.372 04/06/2017    U6VGKQE 129 06/30/2005       CXR      EKG 2017-06-08  Normal sinus rhythm with sinus arrhythmia  Cannot rule out Anterior infarct (cited on or before 03-MAR-2016)  Abnormal ECG  When compared with ECG of 03-MAR-2016 12:37,  No  significant change was found  Confirmed by Sean    ECHO      Anesthesia Plan  Type of Anesthesia, risks & benefits discussed:  Anesthesia Type:  general  Patient's Preference:   Intra-op Monitoring Plan:   Intra-op Monitoring Plan Comments:   Post Op Pain Control Plan:   Post Op Pain Control Plan Comments:   Induction:   IV  Beta Blocker:  Patient is not currently on a Beta-Blocker (No further documentation required).       Informed Consent: Patient understands risks and agrees with Anesthesia plan.  Questions answered.   ASA Score: 3     Day of Surgery Review of History & Physical:        Anesthesia Plan Notes: 2017-06-12   - Jehova's Witness:  No blood transfusion   - abnormal EKG (unchanged)     ~ decent exercise tolerance     ~ no chest discomfort/pain     ~ will take EKG to show her 1° physician     ~ does not recall any prior complaints of chest pain or cancelled cardiac work-up  ( as suggested in  Epic)   - false teeth are in   - UPT neg today   - high BMI (163 kg)          Ready For Surgery From Anesthesia Perspective.

## 2017-06-08 NOTE — PLAN OF CARE
Allergies, medical, surgical, family and psychosocial histories reviewed with patient.  Periop plan of care discussed. Preop instructions given, including medications to take and to hold. Time given for questions and pt voiced understanding.

## 2017-06-08 NOTE — DISCHARGE INSTRUCTIONS
· Arrive on  6/12/2017 AT 05:45 A.M.  · Report to the 2nd floor Procedural Check In Room .   · Nothing to eat or drink after midnight the night before your procedure.  · Take the SYNTHROID  medications the morning of surgery with a small sip of water.                                                         · Please remove all body piercings and leave all your jewelery and valuables at home .  · Please remove your contact lenses.   · Wear loose comfortable clothing.  · You will not be able to drive that day, please make arrangement for transportation to and from your procedure.  · You cannot be alone for 24 hours after anesthesia. Make arrangements as needed.  · Shower twice a day for  3 days before your procedure and the morning of your procedure with Hibiclens antibacterial solution.  · No lotions, creams or powders  · Gargle twice daily with Gold Listerine 3 days prior to surgery  · Stop Aspirin, Ibuprofen, Advil, Motrin, Aleve, Nuprin, Naprosyn (all NSAID Medication) or any other blood thinners 5 days before your procedure unless directed by your physician.  Also hold all over the counter vitamins and herbal supplements for 5 days prior to your procedure.  · You may take Tylenol or Acetaminophen up the day of surgery for any pain.        Call [phone] with any questions.          Anesthesia: General Anesthesia  Youre due to have surgery. During surgery, youll be given medication called anesthesia. (It is also called anesthetic.) This will keep you comfortable and pain-free. Your anesthesia provider will use general anesthesia. This sheet tells you more about it.  What is general anesthesia?    General anesthesia puts you into a state like deep sleep. It goes into the bloodstream (IV anesthetics), into the lungs (gas anesthetics), or both. You feel nothing during the procedure. You will not remember it. During the procedure, the anesthesia provider monitors you continuously. He or she checks your heart rate  and rhythm, blood pressure, breathing, and blood oxygen.  · IV Anesthetics. IV anesthetics are given through an IV line in your arm. Theyre often given first. This is so you are asleep before a gas anesthetic is started. Some kinds of IV anesthetics relieve pain. Others relax you. Your doctor will decide which kind is best in your case.  · Gas Anesthetics. Gas anesthetics are breathed into the lungs. They are often used to keep you asleep. They can be given through a facemask or a tube placed in your larynx or trachea (breathing tube).  · If you have a facemask, your anesthesia provider will most likely place it over your nose and mouth while youre still awake. Youll breathe oxygen through the mask as your IV anesthetic is started. Gas anesthetic may be added through the mask.  · If you have a tube in the larynx or trachea, it will be inserted into your throat after youre asleep.  Anesthesia tools and medications  You will likely have:  · IV anesthetics. These are put into an IV line into your bloodstream.  · Gas anesthetics. You breathe these anesthetics into your lungs, where they pass into your bloodstream.  · Pulse oximeter. This is a small clip that is attached to the end of your finger. This measures your blood oxygen level.  · Electrocardiography leads (electrodes). These are small sticky pads that are placed on your chest. They record your heart rate and rhythm.  · Blood pressure cuff. This reads your blood pressure.    Anesthesia safety  · Follow all instructions you are given for how long not to eat or drink before your procedure.  · Be sure your doctor knows what medications and drugs you take. This includes over-the-counter medications, herbs, supplements, alcohol or other drugs. You will be asked when those were last taken.  · Have an adult family member or friend drive you home after the procedure.  · For the first 24 hours after your surgery:  · Do not drive or use heavy equipment.  · Have a  trusted family member or spouse make important decisions or sign documents.  · Avoid alcohol.  · Have a responsible adult stay with you. He or she can watch for problems and help keep you safe.  © 2962-8007 [employer]. [address] [place] [address]. All rights reserved. This information is not intended as a substitute for professional medical care. Always follow your healthcare professional's instructions.          Discharge Instructions for Dilation and Curettage (D and C)  Your doctor performed dilation and curettage (D&C). The reasons for having this procedure vary from person to person. The D&C may be done to control heavy uterine bleeding, to find the cause of irregular bleeding, to perform an , or to remove pregnancy tissue if you have had a miscarriage.  Home care  · Take it easy. Rest for 2 days as needed.  · Return to your normal activities after 24 to 48 hours. You may also return to work at that time.  · Eat a normal diet.  · Take an over-the-counter pain reliever for pain, if needed.  · Remember, its OK to have bleeding for about a week after the procedure. The amount of bleeding should be similar to what you have during a normal period.  · Dont drive for 24 hours after the procedure unless specifically told by your provider that it is OK to do so.  · Dont have sexual intercourse or use tampons or douches until your doctor says its safe to do so.  Follow-up  · Make a follow-up appointment as directed by our staff.     When to call your doctor  Call your doctor right away if you have any of the following:  · Bleeding that soaks more than one sanitary pad in one hour  · Severe abdominal pain  · Severe cramps  · Fever above 100.4°F (38.0°C)  · A foul smelling vaginal discharge   Date Last Reviewed: 2015  © 0318-8536 [employer]. [address] [place] [address]. All rights reserved. This information is not intended as a substitute for  professional medical care. Always follow your healthcare professional's instructions.

## 2017-06-08 NOTE — PROGRESS NOTES
"Radha Mike is a 51 y.o. female  for preop examination.  She is scheduled for a Hysteroscopy D/C   Next 17  Dr Hackett performed cervical mass Biopsy in the clinic. No malignancy  Found   Hx of endometrial hyperplasia in the past, managed with Progesterone     Past Medical History:   Diagnosis Date    Arthritis     Psoriasis     Thyroid disease      Past Surgical History:   Procedure Laterality Date    ANKLE DEBRIDEMENT      CHOLECYSTECTOMY  1988    TUBAL LIGATION       Family History   Problem Relation Age of Onset    Diabetes Mother     Breast cancer Mother     Heart attack Mother     Heart disease Mother      CHF    Diabetes Father     Cancer Father      mouth    Stomach cancer Father     Arthritis Sister     No Known Problems Brother     No Known Problems Daughter     Hypothyroidism Son     Diabetes Sister      Social History   Substance Use Topics    Smoking status: Never Smoker    Smokeless tobacco: Not on file    Alcohol use Yes      Comment: occasional     OB History    Para Term  AB Living   2 2 2   2   SAB TAB Ectopic Multiple Live Births             # Outcome Date GA Lbr Brian/2nd Weight Sex Delivery Anes PTL Lv   2 Term      Vag-Spont      1 Term      Vag-Spont             /84   Ht 5' 7" (1.702 m)   Wt (!) 162.6 kg (358 lb 7.5 oz)   LMP 2017 (Approximate)   BMI 56.14 kg/m²     ROS:  GENERAL: Denies weight gain or weight loss. Feeling well overall.   SKIN: Denies rash or lesions.   HEAD: Denies head injury or headache.   NODES: Denies enlarged lymph nodes.   CHEST: Denies chest pain or shortness of breath.   CARDIOVASCULAR: Denies palpitations or left sided chest pain.   ABDOMEN: No abdominal pain, constipation, diarrhea, nausea, vomiting or rectal bleeding.   URINARY: No frequency, dysuria, hematuria, or burning on urination.  REPRODUCTIVE: See HPI.   BREASTS: The patient performs breast self-examination and denies pain, " lumps, or nipple discharge.   HEMATOLOGIC: No easy bruisability or excessive bleeding with the exception of menstrual cycles.  MUSCULOSKELETAL: Denies joint pain or swelling.   NEUROLOGIC: Denies syncope or weakness.   PSYCHIATRIC: Denies depression, anxiety or mood swings.    APPEARANCE: Well nourished, well developed, in no acute distress.  AFFECT: WNL, alert and oriented x 3  SKIN: No acne or hirsutism  NECK: Neck symmetric without masses or thyromegaly  NODES: No inguinal, cervical, axillary, or femoral lymph node enlargement  CHEST: Good respiratory effect  ABDOMEN: Soft.  No tenderness or masses.  No hepatosplenomegaly.  No hernias.  PELVIC: Normal external genitalia without lesions.  Normal hair distribution.  Adequate perineal body, normal urethral meatus.  Vagina moist and well rugated without lesions or discharge.  Cervix pink, without lesions, discharge or tenderness.  No significant cystocele or rectocele.  Bimanual exam shows uterus to be normal size, regular, mobile and nontender.  Adnexa without masses or tenderness.    EXTREMITIES: No edema.    ASSESSMENT and PLAN    1- Cervical Mass ?   Polyp/ fibroid   2- s/p cervical mass Bx     I do not see the pass at this  time ,and cervix looks normal . Finding discussed with patient, who wants to proceed with procedure anyway .   There is th possibility that mass is pedunculated and have retracted into the canal , therefore the procedure will confirm its precence or absence .    I have discussed the risks, benefits, indications, and alternatives of the procedure in detail.  The patient verbalizes her understanding.  All questions answered.  Consents signed.  The patient agrees to proceed to proceed as planned.    Mane Barnes M.D.   OB/GYN    6/8/2017

## 2017-06-12 ENCOUNTER — SURGERY (OUTPATIENT)
Age: 52
End: 2017-06-12

## 2017-06-12 ENCOUNTER — HOSPITAL ENCOUNTER (OUTPATIENT)
Facility: HOSPITAL | Age: 52
Discharge: HOME | End: 2017-06-12
Attending: OBSTETRICS & GYNECOLOGY | Admitting: OBSTETRICS & GYNECOLOGY
Payer: COMMERCIAL

## 2017-06-12 ENCOUNTER — ANESTHESIA (OUTPATIENT)
Dept: SURGERY | Facility: HOSPITAL | Age: 52
End: 2017-06-12
Payer: COMMERCIAL

## 2017-06-12 DIAGNOSIS — N93.9 ABNORMAL UTERINE BLEEDING (AUB): Primary | ICD-10-CM

## 2017-06-12 LAB
B-HCG UR QL: NEGATIVE
CTP QC/QA: YES

## 2017-06-12 PROCEDURE — 25000003 PHARM REV CODE 250: Performed by: NURSE PRACTITIONER

## 2017-06-12 PROCEDURE — 25000003 PHARM REV CODE 250: Performed by: NURSE ANESTHETIST, CERTIFIED REGISTERED

## 2017-06-12 PROCEDURE — 88305 TISSUE EXAM BY PATHOLOGIST: CPT | Performed by: PATHOLOGY

## 2017-06-12 PROCEDURE — 37000009 HC ANESTHESIA EA ADD 15 MINS: Performed by: OBSTETRICS & GYNECOLOGY

## 2017-06-12 PROCEDURE — 36000707: Performed by: OBSTETRICS & GYNECOLOGY

## 2017-06-12 PROCEDURE — 58558 HYSTEROSCOPY BIOPSY: CPT | Mod: ,,, | Performed by: OBSTETRICS & GYNECOLOGY

## 2017-06-12 PROCEDURE — 27201423 OPTIME MED/SURG SUP & DEVICES STERILE SUPPLY: Performed by: OBSTETRICS & GYNECOLOGY

## 2017-06-12 PROCEDURE — 25000003 PHARM REV CODE 250: Performed by: OBSTETRICS & GYNECOLOGY

## 2017-06-12 PROCEDURE — 71000015 HC POSTOP RECOV 1ST HR: Performed by: OBSTETRICS & GYNECOLOGY

## 2017-06-12 PROCEDURE — 63600175 PHARM REV CODE 636 W HCPCS: Performed by: ANESTHESIOLOGY

## 2017-06-12 PROCEDURE — 63600175 PHARM REV CODE 636 W HCPCS: Performed by: NURSE ANESTHETIST, CERTIFIED REGISTERED

## 2017-06-12 PROCEDURE — 37000008 HC ANESTHESIA 1ST 15 MINUTES: Performed by: OBSTETRICS & GYNECOLOGY

## 2017-06-12 PROCEDURE — 36000706: Performed by: OBSTETRICS & GYNECOLOGY

## 2017-06-12 PROCEDURE — 71000016 HC POSTOP RECOV ADDL HR: Performed by: OBSTETRICS & GYNECOLOGY

## 2017-06-12 PROCEDURE — 88305 TISSUE EXAM BY PATHOLOGIST: CPT | Mod: 26,,, | Performed by: PATHOLOGY

## 2017-06-12 PROCEDURE — C1782 MORCELLATOR: HCPCS | Performed by: OBSTETRICS & GYNECOLOGY

## 2017-06-12 PROCEDURE — 71000039 HC RECOVERY, EACH ADD'L HOUR: Performed by: OBSTETRICS & GYNECOLOGY

## 2017-06-12 PROCEDURE — 71000033 HC RECOVERY, INTIAL HOUR: Performed by: OBSTETRICS & GYNECOLOGY

## 2017-06-12 RX ORDER — IBUPROFEN 600 MG/1
600 TABLET ORAL EVERY 6 HOURS PRN
Status: DISCONTINUED | OUTPATIENT
Start: 2017-06-12 | End: 2017-06-12 | Stop reason: HOSPADM

## 2017-06-12 RX ORDER — ONDANSETRON 2 MG/ML
4 INJECTION INTRAMUSCULAR; INTRAVENOUS DAILY PRN
Status: DISCONTINUED | OUTPATIENT
Start: 2017-06-12 | End: 2017-06-12

## 2017-06-12 RX ORDER — NAPROXEN 375 MG/1
375 TABLET ORAL 2 TIMES DAILY WITH MEALS
Qty: 30 TABLET | Refills: 0 | Status: SHIPPED | OUTPATIENT
Start: 2017-06-12 | End: 2018-02-15

## 2017-06-12 RX ORDER — LIDOCAINE HCL/PF 100 MG/5ML
SYRINGE (ML) INTRAVENOUS
Status: DISCONTINUED | OUTPATIENT
Start: 2017-06-12 | End: 2017-06-12

## 2017-06-12 RX ORDER — SUCCINYLCHOLINE CHLORIDE 20 MG/ML
INJECTION INTRAMUSCULAR; INTRAVENOUS
Status: DISCONTINUED | OUTPATIENT
Start: 2017-06-12 | End: 2017-06-12

## 2017-06-12 RX ORDER — PROPOFOL 10 MG/ML
VIAL (ML) INTRAVENOUS
Status: DISCONTINUED | OUTPATIENT
Start: 2017-06-12 | End: 2017-06-12

## 2017-06-12 RX ORDER — SODIUM CHLORIDE 0.9 % (FLUSH) 0.9 %
3 SYRINGE (ML) INJECTION EVERY 8 HOURS
Status: DISCONTINUED | OUTPATIENT
Start: 2017-06-12 | End: 2017-06-12

## 2017-06-12 RX ORDER — KETOROLAC TROMETHAMINE 30 MG/ML
30 INJECTION, SOLUTION INTRAMUSCULAR; INTRAVENOUS EVERY 6 HOURS
Status: DISCONTINUED | OUTPATIENT
Start: 2017-06-12 | End: 2017-06-12 | Stop reason: HOSPADM

## 2017-06-12 RX ORDER — FENTANYL CITRATE 50 UG/ML
INJECTION, SOLUTION INTRAMUSCULAR; INTRAVENOUS
Status: DISCONTINUED | OUTPATIENT
Start: 2017-06-12 | End: 2017-06-12

## 2017-06-12 RX ORDER — LIDOCAINE HYDROCHLORIDE 10 MG/ML
1 INJECTION, SOLUTION EPIDURAL; INFILTRATION; INTRACAUDAL; PERINEURAL ONCE
Status: DISCONTINUED | OUTPATIENT
Start: 2017-06-12 | End: 2017-06-12 | Stop reason: HOSPADM

## 2017-06-12 RX ORDER — IBUPROFEN 400 MG/1
800 TABLET ORAL EVERY 8 HOURS
Status: DISCONTINUED | OUTPATIENT
Start: 2017-06-13 | End: 2017-06-12 | Stop reason: HOSPADM

## 2017-06-12 RX ORDER — HYDROMORPHONE HYDROCHLORIDE 2 MG/ML
0.4 INJECTION, SOLUTION INTRAMUSCULAR; INTRAVENOUS; SUBCUTANEOUS EVERY 5 MIN PRN
Status: DISCONTINUED | OUTPATIENT
Start: 2017-06-12 | End: 2017-06-12

## 2017-06-12 RX ORDER — METOCLOPRAMIDE HYDROCHLORIDE 5 MG/ML
10 INJECTION INTRAMUSCULAR; INTRAVENOUS ONCE AS NEEDED
Status: DISCONTINUED | OUTPATIENT
Start: 2017-06-12 | End: 2017-06-12

## 2017-06-12 RX ORDER — ROCURONIUM BROMIDE 10 MG/ML
INJECTION, SOLUTION INTRAVENOUS
Status: DISCONTINUED | OUTPATIENT
Start: 2017-06-12 | End: 2017-06-12

## 2017-06-12 RX ORDER — HYDROMORPHONE HYDROCHLORIDE 2 MG/ML
0.2 INJECTION, SOLUTION INTRAMUSCULAR; INTRAVENOUS; SUBCUTANEOUS EVERY 5 MIN PRN
Status: DISCONTINUED | OUTPATIENT
Start: 2017-06-12 | End: 2017-06-12

## 2017-06-12 RX ORDER — KETOROLAC TROMETHAMINE 30 MG/ML
INJECTION, SOLUTION INTRAMUSCULAR; INTRAVENOUS
Status: DISCONTINUED | OUTPATIENT
Start: 2017-06-12 | End: 2017-06-12

## 2017-06-12 RX ORDER — SODIUM CHLORIDE, SODIUM LACTATE, POTASSIUM CHLORIDE, CALCIUM CHLORIDE 600; 310; 30; 20 MG/100ML; MG/100ML; MG/100ML; MG/100ML
INJECTION, SOLUTION INTRAVENOUS CONTINUOUS
Status: DISCONTINUED | OUTPATIENT
Start: 2017-06-12 | End: 2017-06-12 | Stop reason: HOSPADM

## 2017-06-12 RX ORDER — MIDAZOLAM HYDROCHLORIDE 1 MG/ML
INJECTION, SOLUTION INTRAMUSCULAR; INTRAVENOUS
Status: DISCONTINUED | OUTPATIENT
Start: 2017-06-12 | End: 2017-06-12

## 2017-06-12 RX ORDER — SODIUM CHLORIDE 0.9 % (FLUSH) 0.9 %
3 SYRINGE (ML) INJECTION
Status: DISCONTINUED | OUTPATIENT
Start: 2017-06-12 | End: 2017-06-12

## 2017-06-12 RX ORDER — ONDANSETRON 2 MG/ML
INJECTION INTRAMUSCULAR; INTRAVENOUS
Status: DISCONTINUED | OUTPATIENT
Start: 2017-06-12 | End: 2017-06-12

## 2017-06-12 RX ADMIN — IBUPROFEN 600 MG: 600 TABLET, FILM COATED ORAL at 01:06

## 2017-06-12 RX ADMIN — FENTANYL CITRATE 150 MCG: 50 INJECTION, SOLUTION INTRAMUSCULAR; INTRAVENOUS at 08:06

## 2017-06-12 RX ADMIN — SODIUM CHLORIDE, SODIUM LACTATE, POTASSIUM CHLORIDE, AND CALCIUM CHLORIDE: .6; .31; .03; .02 INJECTION, SOLUTION INTRAVENOUS at 09:06

## 2017-06-12 RX ADMIN — HYDROMORPHONE HYDROCHLORIDE 0.4 MG: 2 INJECTION INTRAMUSCULAR; INTRAVENOUS; SUBCUTANEOUS at 09:06

## 2017-06-12 RX ADMIN — FENTANYL CITRATE 100 MCG: 50 INJECTION, SOLUTION INTRAMUSCULAR; INTRAVENOUS at 09:06

## 2017-06-12 RX ADMIN — SUCCINYLCHOLINE CHLORIDE 120 MG: 20 INJECTION, SOLUTION INTRAMUSCULAR; INTRAVENOUS at 08:06

## 2017-06-12 RX ADMIN — KETOROLAC TROMETHAMINE 30 MG: 30 INJECTION, SOLUTION INTRAMUSCULAR; INTRAVENOUS at 09:06

## 2017-06-12 RX ADMIN — MIDAZOLAM 2 MG: 1 INJECTION INTRAMUSCULAR; INTRAVENOUS at 08:06

## 2017-06-12 RX ADMIN — ONDANSETRON 4 MG: 2 INJECTION, SOLUTION INTRAMUSCULAR; INTRAVENOUS at 09:06

## 2017-06-12 RX ADMIN — ROCURONIUM BROMIDE 5 MG: 10 INJECTION, SOLUTION INTRAVENOUS at 08:06

## 2017-06-12 RX ADMIN — HYDROMORPHONE HYDROCHLORIDE 0.4 MG: 2 INJECTION INTRAMUSCULAR; INTRAVENOUS; SUBCUTANEOUS at 10:06

## 2017-06-12 RX ADMIN — LIDOCAINE HYDROCHLORIDE 60 MG: 20 INJECTION, SOLUTION INTRAVENOUS at 08:06

## 2017-06-12 RX ADMIN — SODIUM CHLORIDE, SODIUM LACTATE, POTASSIUM CHLORIDE, AND CALCIUM CHLORIDE: .6; .31; .03; .02 INJECTION, SOLUTION INTRAVENOUS at 06:06

## 2017-06-12 RX ADMIN — PROPOFOL 200 MG: 10 INJECTION, EMULSION INTRAVENOUS at 08:06

## 2017-06-12 NOTE — OP NOTE
Radha Mike51 y.o.    Date of procedure    6/12/2017    Procedure:Hysteroscopy / D&C Dilatation and Curettage    Preop Dx: Endocervical fibroid   Post op:  Dx the same + endometrial polyp     Findings:  Normal cervix. Prolapsed fibroid seen at clinic  , not seen today   Endometrial cavity : Long finger-like lesion, probably the stalk of the large fibroid prolapsed through the cervix   In the fundus, close to the left  Ostium , a large endometrial polyp   Unable to see tubal os     Surgeon:  Mane Barnes M.D.    Anesthesia :MAC     Bleeding <50cc  .    Specimen to PAthology:    Indications:  Endocervical fibroid                         Abnormal Uterine bleeding       Hysteroscopy fluid deficit 300 cc . Most of it recovered, large amount of it  lost on the floor   Urine = Clear     Description of procedure    With the patient in the supine position, under general anesthesia the patients legs were placed in lithotomy position in yellow yareli stirrups,   She was prepped and draped in the usual manner , urinary bladder catheterized.  Examination under anesthesia revealed normal size uterus ,   The cervix was grasped with a single tooth tenaculum. Uterus sounded to    11 cm  The cervix progressively dilated with hegar  Dilators up to number 6 mm .  A 5mm rigid hysteroscope was advanced through the cervical os, and endometrial cavity was explored with findings described.   Using the jade-clear  Incisor removal device inserted through the operative channel of the hysteroscope, the lesions are  Cut and removed . Material obtained  sent to pathology.  All instruments removed from vagina, minimal bleeding tenaculum site. .    Patient awakened, transferred to recovery in good condition  No complications     Mane Barnes M.D.   OB/GYN    6/12/2017

## 2017-06-12 NOTE — PLAN OF CARE
Pt has tolerated drinking coffee, denies nausea, mild pain at present, tolerable. Up to bathroom with assistance to attempt to void and when pt out of bed, large amount of urine noted to padding under pt. Unable to void any more at this time, denies urge.  Pt wishes to go home and discharge criteria are met. Will prepare for discharge. Vaginal drainage scant sanguineous. No active bleeding noted when pt up to commode.

## 2017-06-12 NOTE — TRANSFER OF CARE
"Anesthesia Transfer of Care Note    Patient: Radha Mike    Procedure(s) Performed: Procedure(s) (LRB):  DILATION-CURETTAGE OF UTERUS (D AND C) (N/A)  HYSTEROSCOPY (N/A)  REMOVAL-POLYP (N/A)  MYOMECTOMY-HYSTEROSCOPIC (N/A)    Patient location: PACU    Anesthesia Type: general    Transport from OR: Transported from OR on 6-10 L/min O2 by face mask with adequate spontaneous ventilation    Post pain: adequate analgesia    Post assessment: no apparent anesthetic complications and tolerated procedure well    Post vital signs: stable    Level of consciousness: awake, alert and oriented    Nausea/Vomiting: no nausea/vomiting    Complications: none    Transfer of care protocol was followed      Last vitals:   Visit Vitals  /71   Pulse 89   Temp 36.7 °C (98.1 °F) (Oral)   Resp 20   Ht 5' 7" (1.702 m)   Wt (!) 163.3 kg (360 lb)   LMP 05/25/2017 (Approximate)   SpO2 99%   Breastfeeding? No   BMI 56.38 kg/m²     "

## 2017-06-12 NOTE — ANESTHESIA RELEASE NOTE
"Anesthesia Release from PACU Note    Patient: Radha Mike    Procedure(s) Performed: Procedure(s) (LRB):  DILATION-CURETTAGE OF UTERUS (D AND C) (N/A)  HYSTEROSCOPY (N/A)  REMOVAL-POLYP (N/A)  MYOMECTOMY-HYSTEROSCOPIC (N/A)    Anesthesia type: general    Post pain: Adequate analgesia    Post assessment: no apparent anesthetic complications    Last Vitals:   Visit Vitals  BP (!) 129/56   Pulse 82   Temp 36.7 °C (98.1 °F) (Oral)   Resp 18   Ht 5' 7" (1.702 m)   Wt (!) 163.3 kg (360 lb)   LMP 05/25/2017 (Approximate)   SpO2 (!) 93%   Breastfeeding? No   BMI 56.38 kg/m²       Post vital signs: stable    Level of consciousness: awake    Nausea/Vomiting: no nausea/no vomiting    Complications: none    Airway Patency: patent    Respiratory: unassisted, room air, nasal cannula    Cardiovascular: stable    Hydration: euvolemic  "

## 2017-06-12 NOTE — H&P
H&P was completed on 6/8/17 and  has been reviewed,   the patient has been seen today  and:  I concur with the findings and no changes have occurred since H&P was written.    Consents reviewed  All questions answered  Will proceed with scheduled procedure    Mane Barnes M.D.   OB/GYN

## 2017-06-12 NOTE — ANESTHESIA POSTPROCEDURE EVALUATION
"Anesthesia Post Evaluation    Patient: Radha Mike    Procedure(s) Performed: Procedure(s) (LRB):  DILATION-CURETTAGE OF UTERUS (D AND C) (N/A)  HYSTEROSCOPY (N/A)  REMOVAL-POLYP (N/A)  MYOMECTOMY-HYSTEROSCOPIC (N/A)    Final Anesthesia Type: general  Patient location during evaluation: PACU  Patient participation: Yes- Able to Participate  Level of consciousness: awake and alert  Post-procedure vital signs: reviewed and stable  Pain management: adequate  Airway patency: patent  PONV status at discharge: No PONV  Anesthetic complications: no      Cardiovascular status: hemodynamically stable  Respiratory status: unassisted and nasal cannula  Hydration status: euvolemic  Follow-up not needed.        Visit Vitals  BP (!) 129/56   Pulse 82   Temp 36.7 °C (98.1 °F) (Oral)   Resp 18   Ht 5' 7" (1.702 m)   Wt (!) 163.3 kg (360 lb)   LMP 05/25/2017 (Approximate)   SpO2 (!) 93%   Breastfeeding? No   BMI 56.38 kg/m²       Pain/Zheng Score: Pain Assessment Performed: Yes (6/12/2017  9:20 AM)  Presence of Pain: denies (6/12/2017  9:20 AM)  Pain Rating Prior to Med Admin: 6 (6/12/2017 10:03 AM)  Zheng Score: 8 (6/12/2017  9:20 AM)      "

## 2017-06-12 NOTE — DISCHARGE INSTRUCTIONS
DIET: You may resume your home diet. If nausea is present, increase your diet gradually with fluids and bland foods    ACTIVITY LEVEL: If you have received sedation or an anesthetic, you may feel sleepy for several hours. Rest until you are more awake. Gradually resume your normal activities    Medications: Pain medication should be taken only if needed and as directed. If antibiotics are prescribed, the medication should be taken until completed. You will be given an updated list of you medications.    No driving, alcoholic beverages or signing legal documents for next 24 hours or while taking pain medication.       CALL THE DOCTOR:    For any obvious bleeding (some dried blood over the incision is normal).      Redness, swelling, foul smell around incision or fever over 101.   Shortness of breath, Coughing up Bloody sputum, Pains or Swelling in your Calves .   Persistent pain or nausea not relieved by medication.    If any unusual problems or difficulties occur contact your doctor. If you cannot contact your doctor but feel your signs and symptoms warrant a physicians attention return to the emergency room.          D&C     After the cervical canal is dilated, a curette is inserted into the uterus to take tissue samples.     Your healthcare provider has recommended you have a D&C (dilation and curettage). This common procedure helps your healthcare provider learn more about problems inside your uterus or is done to treat a miscarriage. During a D&C, the cervix (opening of the uterus) is widened, or dilated. Tissue samples are then removed from the endometrium (lining of the uterus) with an instrument called a curette or with suction. In many cases, D&C is done to find the cause of abnormal vaginal bleeding. Or you may need a D&C as a form of treatment.  A hysteroscopy is usually done along with the D&C for a gynecological problem. A hysteroscopy uses a small instrument to see the inside of the uterus.  Hysteroscopy and D&C can be done in the operating room or in the healthcare provider's office, depending on the healthcare provider who does it.  Preparing for D&C  · Arrange for an adult family member or friend to drive you home.  · Dont eat or drink anything after the midnight before your D&C, unless told otherwise by your healthcare provider.  During your D&C  Just before your D&C, you may get medicine to prevent pain. This may be given through an IV. You may be awake but relaxed during the procedure. Or you may be completely asleep. The type of anesthesia used is different depending on where the procedure takes place. The procedure will not begin until the pain medicine has taken effect. During your D&C:  · Instruments are used to hold the vagina open and to steady the uterus. The cervical canal is widened using tapered instruments called dilators.  · Usually a thin, rigid, or flexible telescope (hysteroscope) is inserted into the vagina to take images of the inside of the uterus. This allows your healthcare provider to see into the uterus.  · The curette or suction is inserted into the uterus. Tissue samples are taken from several areas. These samples are sent to a lab to be studied.  After your D&C  · You will rest for a while in a recovery area.  · You can expect some cramping for a few hours after the D&C. This can be controlled with an over-the-counter pain reliever.  · You may have some light bleeding for a few weeks. Use pads instead of tampons.  · Take showers instead of baths for about a week. Ask your healthcare provider if you should avoid exercising or having sex for a period of time.  Risks and complications  D&C rarely causes complications. But as with any procedure, D&C has some risks. Before your D&C, your healthcare provider will discuss these with you. You will be asked to sign a consent form. Risks may include:  · Infection  · Heavy bleeding  · Perforation of the uterine wall or damage to  nearby organs  · Scar tissue may form causing the lining of the uterus to adhere to itself. This can cause problems with menstrual flow or difficulty getting pregnant in the future. This is called Asherman syndrome.  · The need for additional tests or procedures  · Risks associated with anesthesia (the medicine that makes you sleep during surgery)   Call your healthcare provider   Contact your healthcare provider if you have:  · Heavy bleeding (more than 1 pad an hour)  · A fever of 100.4°F (38°C) or higher, or as directed by your healthcare provider  · Increasing abdominal pain, tenderness, or cramping  · Foul-smelling discharge   Date Last Reviewed: 5/11/2015 © 2000-2016 [employer]. [address] [place] [address], [place] [address]. All rights reserved. This information is not intended as a substitute for professional medical care. Always follow your healthcare professional's instructions.            Naproxen Sodium oral tablet, extended-release  What is this medicine?  NAPROXEN (na PROX en) is a non-steroidal anti-inflammatory drug (NSAID). It is used to reduce swelling and to treat pain. This medicine may be used for dental pain, headache, or painful monthly periods. It is also used for painful joint and muscular problems such as arthritis, tendinitis, bursitis, and gout.  How should I use this medicine?  Take this medicine by mouth with a glass of water. Follow the directions on the prescription label. Take this medicine with food if it upsets your stomach. Try to not lie down for at least 10 minutes after you take it. Take your medicine at regular intervals. Do not take your medicine more often than directed. Long-term, continuous use may increase the risk of heart attack or stroke.  A special MedGuide will be given to you by the pharmacist with each prescription and refill. Be sure to read this information carefully each time.  Talk to your pediatrician regarding the use of this medicine in children.  Special care may be needed.  What side effects may I notice from receiving this medicine?  Side effects that you should report to your doctor or health care professional as soon as possible:  · black or bloody stools, blood in the urine or vomit  · blurred vision  · chest pain  · difficulty breathing or wheezing  · nausea or vomiting  · severe stomach pain  · skin rash, skin redness, blistering or peeling skin, hives, or itching  · slurred speech or weakness on one side of the body  · swelling of eyelids, throat, lips  · unexplained weight gain or swelling  · unusually weak or tired  · yellowing of eyes or skin  Side effects that usually do not require medical attention (report to your doctor or health care professional if they continue or are bothersome):  · constipation  · headache  · heartburn  What may interact with this medicine?  · alcohol  · aspirin  · cidofovir  · diuretics  · lithium  · methotrexate  · other drugs for inflammation like ketorolac or prednisone  · pemetrexed  · probenecid  · warfarin  What if I miss a dose?  If you miss a dose, take it as soon as you can. If it is almost time for your next dose, take only that dose. Do not take double or extra doses.  Where should I keep my medicine?  Keep out of the reach of children.  Store at room temperature between 20 and 25 degrees C (68 and 77 degrees F). Keep container tightly closed. Throw away any unused medicine after the expiration date.  What should I tell my health care provider before I take this medicine?  They need to know if you have any of these conditions:  · asthma  · cigarette smoker  · drink more than 3 alcohol containing drinks a day  · heart disease or circulation problems such as heart failure or leg edema (fluid retention)  · high blood pressure  · kidney disease  · liver disease  · stomach bleeding or ulcers  · an unusual or allergic reaction to naproxen, aspirin, other NSAIDs, other medicines, foods, dyes, or  preservatives  · pregnant or trying to get pregnant  · breast-feeding  What should I watch for while using this medicine?  Tell your doctor or health care professional if your pain does not get better. Talk to your doctor before taking another medicine for pain. Do not treat yourself.  This medicine does not prevent heart attack or stroke. In fact, this medicine may increase the chance of a heart attack or stroke. The chance may increase with longer use of this medicine and in people who have heart disease. If you take aspirin to prevent heart attack or stroke, talk with your doctor or health care professional.  Do not take other medicines that contain aspirin, ibuprofen, or naproxen with this medicine. Side effects such as stomach upset, nausea, or ulcers may be more likely to occur. Many medicines available without a prescription should not be taken with this medicine.  This medicine can cause ulcers and bleeding in the stomach and intestines at any time during treatment. Do not smoke cigarettes or drink alcohol. These increase irritation to your stomach and can make it more susceptible to damage from this medicine. Ulcers and bleeding can happen without warning symptoms and can cause death.  You may get drowsy or dizzy. Do not drive, use machinery, or do anything that needs mental alertness until you know how this medicine affects you. Do not stand or sit up quickly, especially if you are an older patient. This reduces the risk of dizzy or fainting spells.  This medicine can cause you to bleed more easily. Try to avoid damage to your teeth and gums when you brush or floss your teeth.  Date Last Reviewed:   NOTE:This sheet is a summary. It may not cover all possible information. If you have questions about this medicine, talk to your doctor, pharmacist, or health care provider. Copyright© 2016 [employer]

## 2017-06-13 VITALS
HEIGHT: 67 IN | OXYGEN SATURATION: 98 % | TEMPERATURE: 98 F | HEART RATE: 73 BPM | RESPIRATION RATE: 19 BRPM | SYSTOLIC BLOOD PRESSURE: 139 MMHG | WEIGHT: 293 LBS | DIASTOLIC BLOOD PRESSURE: 79 MMHG | BODY MASS INDEX: 45.99 KG/M2

## 2017-06-14 ENCOUNTER — TELEPHONE (OUTPATIENT)
Dept: OBSTETRICS AND GYNECOLOGY | Facility: CLINIC | Age: 52
End: 2017-06-14

## 2017-06-14 NOTE — TELEPHONE ENCOUNTER
Pt is complaining that the naproxen is not helping with the cramping/pain. Pt would like to know if something stronger could be sent in. Please advise

## 2017-06-14 NOTE — TELEPHONE ENCOUNTER
----- Message from Kyleigh Orlando sent at 6/13/2017  4:49 PM CDT -----  Contact: self/[phone]  Patient called to have a stronger medication called in to the pharmacy due to the current medication does not alleviate the pain.    Please advise.

## 2017-06-15 NOTE — TELEPHONE ENCOUNTER
Ask patient about allergy to codeine.  Otherwise I cannot send narcotics     Mane Barnes M.D.   OB/GYN    6/15/2017

## 2017-06-15 NOTE — TELEPHONE ENCOUNTER
Spoke with patient, patient states she is allergic to the codeine, but she is not allergic to the hydrocodone  Please advice

## 2017-06-16 RX ORDER — HYDROCODONE BITARTRATE AND ACETAMINOPHEN 5; 325 MG/1; MG/1
1 TABLET ORAL EVERY 6 HOURS PRN
Qty: 15 TABLET | Refills: 0 | Status: SHIPPED | OUTPATIENT
Start: 2017-06-16 | End: 2018-02-15

## 2017-06-19 ENCOUNTER — PATIENT MESSAGE (OUTPATIENT)
Dept: OBSTETRICS AND GYNECOLOGY | Facility: CLINIC | Age: 52
End: 2017-06-19

## 2017-06-20 ENCOUNTER — TELEPHONE (OUTPATIENT)
Dept: OBSTETRICS AND GYNECOLOGY | Facility: CLINIC | Age: 52
End: 2017-06-20

## 2017-06-20 NOTE — TELEPHONE ENCOUNTER
Is he requesting we call them with pathology results or has this already been reviewed with patient? Please advise.

## 2017-06-30 ENCOUNTER — OFFICE VISIT (OUTPATIENT)
Dept: OBSTETRICS AND GYNECOLOGY | Facility: CLINIC | Age: 52
End: 2017-06-30
Payer: COMMERCIAL

## 2017-06-30 VITALS
SYSTOLIC BLOOD PRESSURE: 130 MMHG | WEIGHT: 293 LBS | BODY MASS INDEX: 45.99 KG/M2 | HEIGHT: 67 IN | DIASTOLIC BLOOD PRESSURE: 70 MMHG

## 2017-06-30 DIAGNOSIS — Z98.890 POSTOPERATIVE STATE: Primary | ICD-10-CM

## 2017-06-30 PROCEDURE — 99024 POSTOP FOLLOW-UP VISIT: CPT | Mod: S$GLB,,, | Performed by: OBSTETRICS & GYNECOLOGY

## 2017-06-30 PROCEDURE — 99999 PR PBB SHADOW E&M-EST. PATIENT-LVL II: CPT | Mod: PBBFAC,,, | Performed by: OBSTETRICS & GYNECOLOGY

## 2017-07-06 NOTE — PROGRESS NOTES
"Radha Mike is a 52 y.o. female  post-op from a  Hysteroscopy D+C   on 17   .  Patient is Doing well postoperatively.      The pathology revealed:  Endometrial polyp   Fragments of muscle fibers ( myoma)    Past Medical History:   Diagnosis Date    Anemia     Arthritis     Psoriasis     Thyroid disease      Past Surgical History:   Procedure Laterality Date    ANKLE DEBRIDEMENT      CHOLECYSTECTOMY      TUBAL LIGATION       Family History   Problem Relation Age of Onset    Diabetes Mother     Breast cancer Mother     Heart attack Mother     Heart disease Mother      CHF    Diabetes Father     Cancer Father      mouth    Stomach cancer Father     Arthritis Sister     No Known Problems Brother     No Known Problems Daughter     Hypothyroidism Son     Diabetes Sister      Social History   Substance Use Topics    Smoking status: Never Smoker    Smokeless tobacco: Never Used    Alcohol use Yes      Comment: occasional     OB History    Para Term  AB Living   2 2 2     2   SAB TAB Ectopic Multiple Live Births                  # Outcome Date GA Lbr Brian/2nd Weight Sex Delivery Anes PTL Lv   2 Term      Vag-Spont      1 Term      Vag-Spont             /70   Ht 5' 7" (1.702 m)   Wt (!) 161.5 kg (356 lb 0.7 oz)   LMP 2017 (Approximate)   BMI 55.76 kg/m²     ROS:  GENERAL: No fever, chills, fatigability or weight loss.  VULVAR: No pain, no lesions and no itching.  VAGINAL: No relaxation, no itching, no discharge, no abnormal bleeding and no lesions.  ABDOMEN: No abdominal pain. Denies nausea. Denies vomiting. No diarrhea. No constipation  BREAST: Denies pain. No lumps. No discharge.  URINARY: No incontinence, no nocturia, no frequency and no dysuria.  CARDIOVASCULAR: No chest pain. No shortness of breath. No leg cramps.  NEUROLOGICAL: No headaches. No vision changes.    PE:   ABDOMEN:  Soft , not distended,not tender  BS present. incisions healing, " clean, intact.   PELVIC      Normal external genitalia without lesions.  Normal hair distribution.  Adequate perineal body, normal urethral meatus.  Vagina moist and well rugated without lesions or discharge.  Cervix pink, without lesions, discharge or tenderness.  No significant cystocele or rectocele.  Bimanual exam shows uterus to be normal size, regular, mobile and nontender.  Adnexa without masses or tenderness.        A/P   1- Satisfactory postoperative   2-s/p   Hysteroscopy D+C  3- Endometrial polyp and Cervical Myoma resection    Intra operative findings and pathology results discussed with patient    Follow up  For annual visit or PRN with Dr Nikolas Barnes M.D.   OB/GYN

## 2018-02-14 ENCOUNTER — TELEPHONE (OUTPATIENT)
Dept: FAMILY MEDICINE | Facility: CLINIC | Age: 53
End: 2018-02-14

## 2018-02-14 NOTE — TELEPHONE ENCOUNTER
Patient states since Thursday she has been having pain in her back and it travels down her right arm. Patient scheduled an appointment for 2/15 at 940a but wants to know if there is anything she can do in the mean time.

## 2018-02-14 NOTE — TELEPHONE ENCOUNTER
----- Message from No Mehta sent at 2/14/2018  7:08 AM CST -----  Contact: self  Patient states experiencing severe  pain on right back and arm.  Pt need appointment for today.   Please call pt at [phone]

## 2018-02-15 ENCOUNTER — OFFICE VISIT (OUTPATIENT)
Dept: FAMILY MEDICINE | Facility: CLINIC | Age: 53
End: 2018-02-15
Payer: COMMERCIAL

## 2018-02-15 ENCOUNTER — PATIENT MESSAGE (OUTPATIENT)
Dept: FAMILY MEDICINE | Facility: CLINIC | Age: 53
End: 2018-02-15

## 2018-02-15 ENCOUNTER — NURSE TRIAGE (OUTPATIENT)
Dept: ADMINISTRATIVE | Facility: CLINIC | Age: 53
End: 2018-02-15

## 2018-02-15 VITALS
OXYGEN SATURATION: 97 % | DIASTOLIC BLOOD PRESSURE: 70 MMHG | SYSTOLIC BLOOD PRESSURE: 122 MMHG | RESPIRATION RATE: 18 BRPM | TEMPERATURE: 98 F | BODY MASS INDEX: 45.99 KG/M2 | WEIGHT: 293 LBS | HEART RATE: 82 BPM | HEIGHT: 67 IN

## 2018-02-15 DIAGNOSIS — Z00.00 ANNUAL PHYSICAL EXAM: Primary | ICD-10-CM

## 2018-02-15 DIAGNOSIS — M25.511 ACUTE PAIN OF RIGHT SHOULDER: ICD-10-CM

## 2018-02-15 PROCEDURE — 99999 PR PBB SHADOW E&M-EST. PATIENT-LVL IV: CPT | Mod: PBBFAC,,, | Performed by: FAMILY MEDICINE

## 2018-02-15 PROCEDURE — 99396 PREV VISIT EST AGE 40-64: CPT | Mod: S$GLB,,, | Performed by: FAMILY MEDICINE

## 2018-02-15 PROCEDURE — 3008F BODY MASS INDEX DOCD: CPT | Mod: S$GLB,,, | Performed by: FAMILY MEDICINE

## 2018-02-15 NOTE — MEDICAL/APP STUDENT
"Subjective:       Patient ID: Radha Mike is a 52 y.o. female.    Chief Complaint: Back Pain (down into shoulder down right arm--since last thursday)    Ms. Radha Mike is a 52 year old female with PMHx of Arthritis, Anemia and thyroid disease who presents to clinic with one week history of R sided shoulder pain. Pain is located in the R side of the back below the shoulder pain and radiates down to the R elbow.  Patient denies any trauma and is not aware of any triggers. Nothing  The pain is rated a 5/10 and is described as constant dull muscle pain with occasional sharp episodes when it radiates to the elbow. The pain is "uncomfortable but tolerable." Another alleviates the pain and nothing particularly aggravates it. Patient has tried deep tissues massages and epison salt baths, which doesn't help.  She has not tried any OTC medications.  Patient thought it was gas, but  is concerned that may be atypical signs of an MI. Patient denies any shoulder, arm or hand weakness and denies hand tingling or burning. Patient denies shortness of breath, chest pain, nausea and vomiting.         Review of Systems   Constitutional: Negative for activity change, appetite change, chills, fatigue and fever.   HENT: Positive for tinnitus. Negative for ear discharge, ear pain, postnasal drip, sinus pain, sinus pressure and sore throat.    Respiratory: Negative for apnea, cough, chest tightness and shortness of breath.    Cardiovascular: Negative for chest pain.   Gastrointestinal: Negative for abdominal pain, constipation, diarrhea, nausea and vomiting.   Musculoskeletal: Positive for back pain and myalgias. Negative for arthralgias, joint swelling, neck pain and neck stiffness.   Neurological: Negative for dizziness, tremors and light-headedness.       Past Medical History:   Diagnosis Date    Anemia     Arthritis     Psoriasis     Thyroid disease      Past Surgical History:   Procedure Laterality Date    ANKLE " DEBRIDEMENT  2005    CHOLECYSTECTOMY  1988    TUBAL LIGATION  1996       Current Outpatient Prescriptions:     SYNTHROID 150 mcg tablet, TAKE 1 TABLET BY MOUTH EVERY DAY, Disp: 90 tablet, Rfl: 0        Objective:       Vitals:    02/15/18 0920   BP: 122/70   Pulse: 82   Resp: 18   Temp: 97.6 °F (36.4 °C)       Physical Exam   Constitutional: She is oriented to person, place, and time.   Cardiovascular: Normal rate and regular rhythm.    Murmur heard.  Pulmonary/Chest: Effort normal and breath sounds normal. No respiratory distress. She has no wheezes. She has no rales.   Abdominal: Soft. Bowel sounds are normal. She exhibits no distension. There is no tenderness.   Musculoskeletal: She exhibits no tenderness (Tenderness and hard to palpation of R infraspinatus. ).        Right shoulder: She exhibits no bony tenderness, no swelling and no spasm.        Arms:  Neurological: She is alert and oriented to person, place, and time. She has normal strength. She displays no tremor. She exhibits normal muscle tone.   Reflex Scores:       Tricep reflexes are 2+ on the right side and 2+ on the left side.       Bicep reflexes are 2+ on the right side and 2+ on the left side.       Brachioradialis reflexes are 2+ on the right side and 2+ on the left side.          Assessment:       1. Muscle Spasm of R infraspinatus       Plan:         Muscle Spasm  - Start Flexeril 5 mg TID for 7 days.         Pascale Nguyen  Medical Student Year 4  [employer]

## 2018-02-16 ENCOUNTER — TELEPHONE (OUTPATIENT)
Dept: FAMILY MEDICINE | Facility: CLINIC | Age: 53
End: 2018-02-16

## 2018-02-16 RX ORDER — CYCLOBENZAPRINE HCL 5 MG
5 TABLET ORAL 3 TIMES DAILY PRN
Qty: 30 TABLET | Refills: 0 | Status: SHIPPED | OUTPATIENT
Start: 2018-02-16 | End: 2018-02-26

## 2018-02-16 NOTE — TELEPHONE ENCOUNTER
"  Reason for Disposition   [1] Prescription not at pharmacy AND [2] was prescribed today by PCP    Answer Assessment - Initial Assessment Questions  1. SYMPTOMS: "Do you have any symptoms?"   Pain to back/arm. patient states medication was not available at pharmacy---    EPIC visit note [copied/pasted] indicated    Assessment:  1.Muscle Spasm of R infraspinatus  Plan:      Muscle Spasm  - Start Flexeril 5 mg TID for 7 days.       Pascale Nguyen  Medical Student Year 4  [employer]     Electronically signed by Pascale Nguyen at 2/15/2018 12:23 PM      Office Visit on 2/15/2018       Detailed Report     Note indicated "co-signature" required. Message routed to Dr. Darion MD for notification/advisemet requested.     2. SEVERITY: If symptoms are present, ask "Are they mild, moderate or severe?"      Severe    Protocols used: ST MEDICATION QUESTION CALL-A-AH    "

## 2018-02-16 NOTE — TELEPHONE ENCOUNTER
----- Message from Maty Barajas sent at 2/16/2018  8:36 AM CST -----  Contact: [phone] self  Patient would like refill of Flexeril 75mg sent to [facility]. Please advise.

## 2018-02-19 NOTE — PROGRESS NOTES
HPI:  Radha Mike is a 52 y.o. year old female that  presents with pain that in her right shoulder. She denies any trauma. She felt that she may have slept  on it wrong. She has no history of previous . SHe has been using OTC  Chief Complaint   Patient presents with    Back Pain     down into shoulder down right arm--since last thursday   .     HPI      Past Medical History:   Diagnosis Date    Anemia     Arthritis     Psoriasis     Thyroid disease      Social History     Social History    Marital status:      Spouse name: N/A    Number of children: N/A    Years of education: N/A     Occupational History     [employer]     Social History Main Topics    Smoking status: Never Smoker    Smokeless tobacco: Never Used    Alcohol use Yes      Comment: occasional    Drug use: No    Sexual activity: Yes     Partners: Male     Birth control/ protection: Surgical     Other Topics Concern    Not on file     Social History Narrative    No narrative on file     Past Surgical History:   Procedure Laterality Date    ANKLE DEBRIDEMENT  2005    CHOLECYSTECTOMY  1988    TUBAL LIGATION  1996     Family History   Problem Relation Age of Onset    Diabetes Mother     Breast cancer Mother     Heart attack Mother     Heart disease Mother      CHF    Diabetes Father     Cancer Father      mouth    Stomach cancer Father     Arthritis Sister     No Known Problems Brother     No Known Problems Daughter     Hypothyroidism Son     Diabetes Sister            Review of Systems  General ROS: negative for chills, fever or weight loss  ENT ROS: negative for epistaxis, sore throat or rhinorrhea  Respiratory ROS: no cough, shortness of breath, or wheezing  Cardiovascular ROS: no chest pain or dyspnea on exertion  Gastrointestinal ROS: no abdominal pain, change in bowel habits, or black/ bloody stools  Muscu ROS: right shoulder pain    Physical Exam:  /70   Pulse 82   Temp 97.6 °F (36.4 °C) (Oral)   Resp  "18   Ht 5' 7" (1.702 m)   Wt (!) 167.8 kg (370 lb 0.7 oz)   SpO2 97%   BMI 57.96 kg/m²   General appearance: alert, cooperative, no distress  Constitutional:Oriented to person, place, and time.appears well-developed and well-nourished.  HEENT: Normocephalic, atraumatic, neck symmetrical, no nasal discharge, TM- clear bilaterally  Lungs: clear to auscultation bilaterally, no dullness to percussion bilaterally  Heart: regular rate and rhythm without rub; no displacement of the PMI , S1&S2 present  Musculosk: pain of the infraspinatus.on the right side.  Physical Exam    LABS:    Complete Blood Count  Lab Results   Component Value Date    RBC 5.00 06/08/2017    HGB 11.1 (L) 06/08/2017    HCT 35.2 (L) 06/08/2017    MCV 70 (L) 06/08/2017    MCH 22.2 (L) 06/08/2017    MCHC 31.5 (L) 06/08/2017    RDW 16.2 (H) 06/08/2017     (H) 06/08/2017    MPV 10.6 06/08/2017    GRAN 6.3 06/08/2017    GRAN 63.6 06/08/2017    LYMPH 2.5 06/08/2017    LYMPH 24.8 06/08/2017    MONO 0.8 06/08/2017    MONO 7.7 06/08/2017    EOS 0.3 06/08/2017    BASO 0.03 06/08/2017    EOSINOPHIL 3.3 06/08/2017    BASOPHIL 0.3 06/08/2017    DIFFMETHOD Automated 06/08/2017       Comprehensive Metabolic Panel  Lab Results   Component Value Date    GLU 88 06/08/2017    BUN 9 06/08/2017    CREATININE 0.9 06/08/2017     06/08/2017    K 3.8 06/08/2017     06/08/2017    PROT 7.8 04/06/2017    ALBUMIN 4.1 04/06/2017    BILITOT 0.6 04/06/2017    AST 19 04/06/2017    ALKPHOS 83 04/06/2017    CO2 28 06/08/2017    ALT 21 04/06/2017    ANIONGAP 7 (L) 06/08/2017    EGFRNONAA >60 06/08/2017    ESTGFRAFRICA >60 06/08/2017       LIPID  Lab Results   Component Value Date    CHOL 137 04/06/2017    HDL 59 04/06/2017         TSH  Lab Results   Component Value Date    TSH 1.372 04/06/2017       Current Outpatient Prescriptions   Medication Sig Dispense Refill    SYNTHROID 150 mcg tablet TAKE 1 TABLET BY MOUTH EVERY DAY 90 tablet 0    cyclobenzaprine " (FLEXERIL) 5 MG tablet Take 1 tablet (5 mg total) by mouth 3 (three) times daily as needed for Muscle spasms. 30 tablet 0     No current facility-administered medications for this visit.        Assessment:    ICD-10-CM ICD-9-CM    1. Annual physical exam Z00.00 V70.0 Comprehensive metabolic panel      Lipid panel      CBC auto differential      TSH   2. Acute pain of right shoulder M25.511 719.41          Plan:  Will use the Flexeril tid x 7 days.  Follow-up in 8 weeks (on 4/12/2018).          Melanie Orlando MD

## 2018-04-09 ENCOUNTER — LAB VISIT (OUTPATIENT)
Dept: LAB | Facility: HOSPITAL | Age: 53
End: 2018-04-09
Attending: FAMILY MEDICINE
Payer: COMMERCIAL

## 2018-04-09 DIAGNOSIS — Z11.59 NEED FOR HEPATITIS C SCREENING TEST: ICD-10-CM

## 2018-04-09 DIAGNOSIS — Z00.00 ANNUAL PHYSICAL EXAM: ICD-10-CM

## 2018-04-09 LAB
ALBUMIN SERPL BCP-MCNC: 4.2 G/DL
ALP SERPL-CCNC: 73 U/L
ALT SERPL W/O P-5'-P-CCNC: 24 U/L
ANION GAP SERPL CALC-SCNC: 12 MMOL/L
ANISOCYTOSIS BLD QL SMEAR: SLIGHT
AST SERPL-CCNC: 24 U/L
BASOPHILS # BLD AUTO: 0.02 K/UL
BASOPHILS NFR BLD: 0.3 %
BILIRUB SERPL-MCNC: 0.7 MG/DL
BUN SERPL-MCNC: 9 MG/DL
CALCIUM SERPL-MCNC: 9.8 MG/DL
CHLORIDE SERPL-SCNC: 104 MMOL/L
CHOLEST SERPL-MCNC: 122 MG/DL
CHOLEST/HDLC SERPL: 2.7 {RATIO}
CO2 SERPL-SCNC: 29 MMOL/L
CREAT SERPL-MCNC: 0.84 MG/DL
DIFFERENTIAL METHOD: ABNORMAL
EOSINOPHIL # BLD AUTO: 0.2 K/UL
EOSINOPHIL NFR BLD: 3.2 %
ERYTHROCYTE [DISTWIDTH] IN BLOOD BY AUTOMATED COUNT: 15.9 %
EST. GFR  (AFRICAN AMERICAN): >60 ML/MIN/1.73 M^2
EST. GFR  (NON AFRICAN AMERICAN): >60 ML/MIN/1.73 M^2
GLUCOSE SERPL-MCNC: 99 MG/DL
HCT VFR BLD AUTO: 39.7 %
HDLC SERPL-MCNC: 46 MG/DL
HDLC SERPL: 37.7 %
HGB BLD-MCNC: 12.2 G/DL
LDLC SERPL CALC-MCNC: 67.6 MG/DL
LYMPHOCYTES # BLD AUTO: 1.7 K/UL
LYMPHOCYTES NFR BLD: 26.7 %
MCH RBC QN AUTO: 22.7 PG
MCHC RBC AUTO-ENTMCNC: 30.7 G/DL
MCV RBC AUTO: 74 FL
MONOCYTES # BLD AUTO: 0.5 K/UL
MONOCYTES NFR BLD: 7.8 %
NEUTROPHILS # BLD AUTO: 3.8 K/UL
NEUTROPHILS NFR BLD: 62 %
NONHDLC SERPL-MCNC: 76 MG/DL
PLATELET # BLD AUTO: 291 K/UL
PMV BLD AUTO: 12.7 FL
POIKILOCYTOSIS BLD QL SMEAR: SLIGHT
POTASSIUM SERPL-SCNC: 4.3 MMOL/L
PROT SERPL-MCNC: 7.8 G/DL
RBC # BLD AUTO: 5.38 M/UL
SODIUM SERPL-SCNC: 145 MMOL/L
T4 FREE SERPL-MCNC: 0.96 NG/DL
TRIGL SERPL-MCNC: 42 MG/DL
TSH SERPL DL<=0.005 MIU/L-ACNC: 5.92 UIU/ML
WBC # BLD AUTO: 6.19 K/UL

## 2018-04-09 PROCEDURE — 36415 COLL VENOUS BLD VENIPUNCTURE: CPT | Mod: PO

## 2018-04-09 PROCEDURE — 85025 COMPLETE CBC W/AUTO DIFF WBC: CPT | Mod: PO

## 2018-04-09 PROCEDURE — 80053 COMPREHEN METABOLIC PANEL: CPT | Mod: PO

## 2018-04-09 PROCEDURE — 80061 LIPID PANEL: CPT

## 2018-04-09 PROCEDURE — 86803 HEPATITIS C AB TEST: CPT | Mod: PO

## 2018-04-09 PROCEDURE — 84443 ASSAY THYROID STIM HORMONE: CPT | Mod: PO

## 2018-04-09 PROCEDURE — 84439 ASSAY OF FREE THYROXINE: CPT

## 2018-04-10 LAB — HCV AB SERPL QL IA: NEGATIVE

## 2018-04-12 ENCOUNTER — OFFICE VISIT (OUTPATIENT)
Dept: FAMILY MEDICINE | Facility: CLINIC | Age: 53
End: 2018-04-12
Payer: COMMERCIAL

## 2018-04-12 VITALS
BODY MASS INDEX: 45.99 KG/M2 | DIASTOLIC BLOOD PRESSURE: 76 MMHG | TEMPERATURE: 98 F | HEIGHT: 67 IN | SYSTOLIC BLOOD PRESSURE: 126 MMHG | RESPIRATION RATE: 18 BRPM | WEIGHT: 293 LBS | HEART RATE: 79 BPM | OXYGEN SATURATION: 96 %

## 2018-04-12 DIAGNOSIS — E03.9 ACQUIRED HYPOTHYROIDISM: ICD-10-CM

## 2018-04-12 PROCEDURE — 99214 OFFICE O/P EST MOD 30 MIN: CPT | Mod: S$GLB,,, | Performed by: FAMILY MEDICINE

## 2018-04-12 PROCEDURE — 99999 PR PBB SHADOW E&M-EST. PATIENT-LVL III: CPT | Mod: PBBFAC,,, | Performed by: FAMILY MEDICINE

## 2018-04-12 RX ORDER — LEVOTHYROXINE SODIUM 175 UG/1
175 TABLET ORAL DAILY
Qty: 30 TABLET | Refills: 2 | Status: SHIPPED | OUTPATIENT
Start: 2018-04-12 | End: 2018-12-07 | Stop reason: SDUPTHER

## 2018-04-13 ENCOUNTER — TELEPHONE (OUTPATIENT)
Dept: FAMILY MEDICINE | Facility: CLINIC | Age: 53
End: 2018-04-13

## 2018-04-13 DIAGNOSIS — E03.9 ACQUIRED HYPOTHYROIDISM: Primary | ICD-10-CM

## 2018-04-16 NOTE — PROGRESS NOTES
HPI:  Radha Mike is a 52 y.o. year old female that presents for lab results and medication refill.  Patient states she has committed to an complete a 30 day carbohydrate free and has lost significant weight.  She is still committed to maintaining her carbohydrate reduced diet.  Chief Complaint   Patient presents with    Annual Exam     lab results    Medication Refill     Synthroid   .     HPI      Past Medical History:   Diagnosis Date    Anemia     Arthritis     Psoriasis     Thyroid disease      Social History     Social History    Marital status:      Spouse name: N/A    Number of children: N/A    Years of education: N/A     Occupational History     [employer]     Social History Main Topics    Smoking status: Never Smoker    Smokeless tobacco: Never Used    Alcohol use Yes      Comment: occasional    Drug use: No    Sexual activity: Yes     Partners: Male     Birth control/ protection: Surgical     Other Topics Concern    Not on file     Social History Narrative    No narrative on file     Past Surgical History:   Procedure Laterality Date    ANKLE DEBRIDEMENT  2005    CHOLECYSTECTOMY  1988    TUBAL LIGATION  1996     Family History   Problem Relation Age of Onset    Diabetes Mother     Breast cancer Mother     Heart attack Mother     Heart disease Mother      CHF    Diabetes Father     Cancer Father      mouth    Stomach cancer Father     Arthritis Sister     No Known Problems Brother     No Known Problems Daughter     Hypothyroidism Son     Diabetes Sister            Review of Systems  General ROS: negative for chills, fever or weight loss  ENT ROS: negative for epistaxis, sore throat or rhinorrhea  Respiratory ROS: no cough, shortness of breath, or wheezing  Cardiovascular ROS: no chest pain or dyspnea on exertion  Gastrointestinal ROS: no abdominal pain, change in bowel habits, or black/ bloody stools    Physical Exam:  /76 (BP Location: Right arm, Patient  "Position: Sitting, BP Method: Large (Manual))   Pulse 79   Temp 97.8 °F (36.6 °C) (Oral)   Resp 18   Ht 5' 7" (1.702 m)   Wt (!) 158.8 kg (350 lb 3.2 oz)   SpO2 96%   BMI 54.85 kg/m²   General appearance: alert, cooperative, no distress  Constitutional:Oriented to person, place, and time.appears well-developed and well-nourished.  HEENT: Normocephalic, atraumatic, neck symmetrical, no nasal discharge, TM- clear bilaterally  Lungs: clear to auscultation bilaterally, no dullness to percussion bilaterally  Heart: regular rate and rhythm without rub; no displacement of the PMI , S1&S2 present  Abdomen: soft, non-tender; bowel sounds normoactive; no organomegaly  Physical Exam    LABS:    Complete Blood Count  Lab Results   Component Value Date    RBC 5.38 04/09/2018    HGB 12.2 04/09/2018    HCT 39.7 04/09/2018    MCV 74 (L) 04/09/2018    MCH 22.7 (L) 04/09/2018    MCHC 30.7 (L) 04/09/2018    RDW 15.9 (H) 04/09/2018     04/09/2018    MPV 12.7 04/09/2018    GRAN 3.8 04/09/2018    GRAN 62.0 04/09/2018    LYMPH 1.7 04/09/2018    LYMPH 26.7 04/09/2018    MONO 0.5 04/09/2018    MONO 7.8 04/09/2018    EOS 0.2 04/09/2018    BASO 0.02 04/09/2018    EOSINOPHIL 3.2 04/09/2018    BASOPHIL 0.3 04/09/2018    DIFFMETHOD Automated 04/09/2018       Comprehensive Metabolic Panel  Lab Results   Component Value Date    GLU 99 04/09/2018    BUN 9 04/09/2018    CREATININE 0.84 04/09/2018     04/09/2018    K 4.3 04/09/2018     04/09/2018    PROT 7.8 04/09/2018    ALBUMIN 4.2 04/09/2018    BILITOT 0.7 04/09/2018    AST 24 04/09/2018    ALKPHOS 73 04/09/2018    CO2 29 04/09/2018    ALT 24 04/09/2018    ANIONGAP 12 04/09/2018    EGFRNONAA >60.0 04/09/2018    ESTGFRAFRICA >60.0 04/09/2018       LIPID  Lab Results   Component Value Date    CHOL 122 04/09/2018    HDL 46 04/09/2018         TSH  Lab Results   Component Value Date    TSH 5.920 (H) 04/09/2018       Current Outpatient Prescriptions   Medication Sig Dispense " Refill    levothyroxine (SYNTHROID, LEVOTHROID) 175 MCG tablet Take 1 tablet (175 mcg total) by mouth once daily. 30 tablet 2     No current facility-administered medications for this visit.        Assessment:    ICD-10-CM ICD-9-CM    1. Acquired hypothyroidism E03.9 244.9 levothyroxine (SYNTHROID, LEVOTHROID) 175 MCG tablet         Plan:    Follow-up in 1 month (on 5/12/2018).          Melanie Orlando MD

## 2018-05-24 NOTE — TELEPHONE ENCOUNTER
Left message on voicemail to call back to review symptoms, but that Dr Chani Alvarado is booked out several weeks--we can give pt appt with Michael WALTERS on June 7. I pulled 9/26/17 colon report from [facility].     Last Procedure:  Colonoscopy 9/26/17  Last Diagnosis:abd kristina Pathology reviewed:  Benign endometrial polyp   Fragments of leiomyoma  No hyperplasia or malignancy     Mane Barnes M.D.   OB/GYN    6/20/2017

## 2018-08-27 ENCOUNTER — TELEPHONE (OUTPATIENT)
Dept: FAMILY MEDICINE | Facility: CLINIC | Age: 53
End: 2018-08-27

## 2018-08-27 NOTE — TELEPHONE ENCOUNTER
----- Message from Ruth Ann Mccann sent at 8/27/2018  4:06 PM CDT -----  Contact: [phone]/self  Patient requesting to speak with you concerning pain in her ear. Please call and advise.

## 2018-08-27 NOTE — TELEPHONE ENCOUNTER
Patient is having ear fullness, patient states she's sure it's impacted ear wax. Patient would like ear drops to dissolve wax sent to her pharmacy. Please advise.

## 2018-09-06 ENCOUNTER — OFFICE VISIT (OUTPATIENT)
Dept: INTERNAL MEDICINE | Facility: CLINIC | Age: 53
End: 2018-09-06
Payer: COMMERCIAL

## 2018-09-06 VITALS
OXYGEN SATURATION: 98 % | TEMPERATURE: 98 F | HEIGHT: 68 IN | WEIGHT: 293 LBS | SYSTOLIC BLOOD PRESSURE: 112 MMHG | DIASTOLIC BLOOD PRESSURE: 58 MMHG | HEART RATE: 79 BPM | BODY MASS INDEX: 44.41 KG/M2

## 2018-09-06 DIAGNOSIS — H61.21 IMPACTED CERUMEN OF RIGHT EAR: ICD-10-CM

## 2018-09-06 DIAGNOSIS — S39.012A BACK STRAIN, INITIAL ENCOUNTER: ICD-10-CM

## 2018-09-06 PROCEDURE — 99214 OFFICE O/P EST MOD 30 MIN: CPT | Mod: S$GLB,,, | Performed by: INTERNAL MEDICINE

## 2018-09-06 PROCEDURE — 99999 PR PBB SHADOW E&M-EST. PATIENT-LVL IV: CPT | Mod: PBBFAC,,, | Performed by: INTERNAL MEDICINE

## 2018-09-06 RX ORDER — CYCLOBENZAPRINE HCL 5 MG
5 TABLET ORAL 3 TIMES DAILY PRN
Qty: 30 TABLET | Refills: 0 | Status: SHIPPED | OUTPATIENT
Start: 2018-09-06 | End: 2018-09-16

## 2018-09-06 RX ORDER — NAPROXEN 500 MG/1
500 TABLET ORAL 2 TIMES DAILY PRN
Qty: 30 TABLET | Refills: 0 | Status: SHIPPED | OUTPATIENT
Start: 2018-09-06 | End: 2019-09-16

## 2018-09-06 NOTE — PATIENT INSTRUCTIONS
Cyclobenzaprine tablets  What is this medicine?  CYCLOBENZAPRINE (sye kloe ENEDINA za preen) is a muscle relaxer. It is used to treat muscle pain, spasms, and stiffness.  How should I use this medicine?  Take this medicine by mouth with a glass of water. Follow the directions on the prescription label. If this medicine upsets your stomach, take it with food or milk. Take your medicine at regular intervals. Do not take it more often than directed.  Talk to your pediatrician regarding the use of this medicine in children. Special care may be needed.  What side effects may I notice from receiving this medicine?  Side effects that you should report to your doctor or health care professional as soon as possible:  · allergic reactions like skin rash, itching or hives, swelling of the face, lips, or tongue  · breathing problems  · chest pain  · fast, irregular heartbeat  · hallucinations  · seizures  · unusually weak or tired  Side effects that usually do not require medical attention (report to your doctor or health care professional if they continue or are bothersome):  · headache  · nausea, vomiting  What may interact with this medicine?  Do not take this medicine with any of the following medications:  · certain medicines for fungal infections like fluconazole, itraconazole, ketoconazole, posaconazole, voriconazole  · cisapride  · dofetilide  · dronedarone  · halofantrine  · levomethadyl  · MAOIs like Carbex, Eldepryl, Marplan, Nardil, and Parnate  · narcotic medicines for cough  · pimozide  · thioridazine  · ziprasidone  This medicine may also interact with the following medications:  · alcohol  · antihistamines for allergy, cough and cold  · certain medicines for anxiety or sleep  · certain medicines for cancer  · certain medicines for depression like amitriptyline, fluoxetine, sertraline  · certain medicines for infection like alfuzosin, chloroquine, clarithromycin, levofloxacin, mefloquine, pentamidine,  troleandomycin  · certain medicines for irregular heart beat  · certain medicines for seizures like phenobarbital, primidone  · contrast dyes  · general anesthetics like halothane, isoflurane, methoxyflurane, propofol  · local anesthetics like lidocaine, pramoxine, tetracaine  · medicines that relax muscles for surgery  · narcotic medicines for pain  · other medicines that prolong the QT interval (cause an abnormal heart rhythm)  · phenothiazines like chlorpromazine, mesoridazine, prochlorperazine  What if I miss a dose?  If you miss a dose, take it as soon as you can. If it is almost time for your next dose, take only that dose. Do not take double or extra doses.  Where should I keep my medicine?  Keep out of the reach of children.  Store at room temperature between 15 and 30 degrees C (59 and 86 degrees F). Keep container tightly closed. Throw away any unused medicine after the expiration date.  What should I tell my health care provider before I take this medicine?  They need to know if you have any of these conditions:  · heart disease, irregular heartbeat, or previous heart attack  · liver disease  · thyroid problem  · an unusual or allergic reaction to cyclobenzaprine, tricyclic antidepressants, lactose, other medicines, foods, dyes, or preservatives  · pregnant or trying to get pregnant  · breast-feeding  What should I watch for while using this medicine?  Tell your doctor or health care professional if your symptoms do not start to get better or if they get worse.  You may get drowsy or dizzy. Do not drive, use machinery, or do anything that needs mental alertness until you know how this medicine affects you. Do not stand or sit up quickly, especially if you are an older patient. This reduces the risk of dizzy or fainting spells. Alcohol may interfere with the effect of this medicine. Avoid alcoholic drinks.  If you are taking another medicine that also causes drowsiness, you may have more side effects. Give  your health care provider a list of all medicines you use. Your doctor will tell you how much medicine to take. Do not take more medicine than directed. Call emergency for help if you have problems breathing or unusual sleepiness.  Your mouth may get dry. Chewing sugarless gum or sucking hard candy, and drinking plenty of water may help. Contact your doctor if the problem does not go away or is severe.  NOTE:This sheet is a summary. It may not cover all possible information. If you have questions about this medicine, talk to your doctor, pharmacist, or health care provider. Copyright© 2017 [employer]        Naproxen and naproxen sodium oral immediate-release tablets  What is this medicine?  NAPROXEN (na PROX en) is a non-steroidal anti-inflammatory drug (NSAID). It is used to reduce swelling and to treat pain. This medicine may be used for dental pain, headache, or painful monthly periods. It is also used for painful joint and muscular problems such as arthritis, tendinitis, bursitis, and gout.  How should I use this medicine?  Take this medicine by mouth with a glass of water. Follow the directions on the prescription label. Take it with food if your stomach gets upset. Try to not lie down for at least 10 minutes after you take it. Take your medicine at regular intervals. Do not take your medicine more often than directed. Long-term, continuous use may increase the risk of heart attack or stroke.  A special MedGuide will be given to you by the pharmacist with each prescription and refill. Be sure to read this information carefully each time.  Talk to your pediatrician regarding the use of this medicine in children. Special care may be needed.  What side effects may I notice from receiving this medicine?  Side effects that you should report to your doctor or health care professional as soon as possible:  · black or bloody stools, blood in the urine or vomit  · blurred vision  · chest pain  · difficulty breathing  or wheezing  · nausea or vomiting  · severe stomach pain  · skin rash, skin redness, blistering or peeling skin, hives, or itching  · slurred speech or weakness on one side of the body  · swelling of eyelids, throat, lips  · unexplained weight gain or swelling  · unusually weak or tired  · yellowing of eyes or skin  Side effects that usually do not require medical attention (report to your doctor or health care professional if they continue or are bothersome):  · constipation  · headache  · heartburn  What may interact with this medicine?  · alcohol  · aspirin  · cidofovir  · diuretics  · lithium  · methotrexate  · other drugs for inflammation like ketorolac or prednisone  · pemetrexed  · probenecid  · warfarin  What if I miss a dose?  If you miss a dose, take it as soon as you can. If it is almost time for your next dose, take only that dose. Do not take double or extra doses.  Where should I keep my medicine?  Keep out of the reach of children.  Store at room temperature between 15 and 30 degrees C (59 and 86 degrees F). Keep container tightly closed. Throw away any unused medicine after the expiration date.  What should I tell my health care provider before I take this medicine?  They need to know if you have any of these conditions:  · asthma  · cigarette smoker  · drink more than 3 alcohol containing drinks a day  · heart disease or circulation problems such as heart failure or leg edema (fluid retention)  · high blood pressure  · kidney disease  · liver disease  · stomach bleeding or ulcers  · an unusual or allergic reaction to naproxen, aspirin, other NSAIDs, other medicines, foods, dyes, or preservatives  · pregnant or trying to get pregnant  · breast-feeding  What should I watch for while using this medicine?  Tell your doctor or health care professional if your pain does not get better. Talk to your doctor before taking another medicine for pain. Do not treat yourself.  This medicine does not prevent heart  attack or stroke. In fact, this medicine may increase the chance of a heart attack or stroke. The chance may increase with longer use of this medicine and in people who have heart disease. If you take aspirin to prevent heart attack or stroke, talk with your doctor or health care professional.  Do not take other medicines that contain aspirin, ibuprofen, or naproxen with this medicine. Side effects such as stomach upset, nausea, or ulcers may be more likely to occur. Many medicines available without a prescription should not be taken with this medicine.  This medicine can cause ulcers and bleeding in the stomach and intestines at any time during treatment. Do not smoke cigarettes or drink alcohol. These increase irritation to your stomach and can make it more susceptible to damage from this medicine. Ulcers and bleeding can happen without warning symptoms and can cause death.  You may get drowsy or dizzy. Do not drive, use machinery, or do anything that needs mental alertness until you know how this medicine affects you. Do not stand or sit up quickly, especially if you are an older patient. This reduces the risk of dizzy or fainting spells.  This medicine can cause you to bleed more easily. Try to avoid damage to your teeth and gums when you brush or floss your teeth.  NOTE:This sheet is a summary. It may not cover all possible information. If you have questions about this medicine, talk to your doctor, pharmacist, or health care provider. Copyright© 2017 [employer]        Back Sprain or Strain    Injury to the muscles (strain) or ligaments (sprain) around the spine can be troubling. Injury may occur after a sudden forceful twisting or bending force such as in a car accident, after a simple awkward movement, or after lifting something heavy with poor body positioning. In any case, muscle spasm is often present and adds to the pain.  Thankfully, most people feel better in 1 to 2 weeks, and most of the rest in 1  to 2 months. Most people can remain active. Unless you had a forceful or traumatic physical injury such as a car accident or fall, X-rays may not be ordered for the first evaluation of a back sprain or strain. If pain continues and does not respond to medical treatment, your healthcare provider may then order X-rays and other tests.  Home care  The following guidelines will help you care for your injury at home:  · When in bed, try to find a comfortable position. A firm mattress is best. Try lying flat on your back with pillows under your knees. You can also try lying on your side with your knees bent up toward your chest and a pillow between your knees.  · Don't sit for long periods. Try not to take long car rides or take other trips that have you sitting for a long time. This puts more stress on the lower back than standing or walking.  · During the first 24 to 72 hours after an injury or flare-up, apply an ice pack to the painful area for 20 minutes. Then remove it for 20 minutes. Do this for 60 to 90 minutes, or several times a day. This will reduce swelling and pain. Be sure to wrap the ice pack in a thin towel or plastic to protect your skin.  · You can start with ice, then switch to heat. Heat from a hot shower, hot bath, or heating pad reduces pain and works well for muscle spasms. Put heat on the painful area for 20 minutes, then remove for 20 minutes. Do this for 60 to 90 minutes, or several times a day. Do not use a heating pad while sleeping. It can burn the skin.  · You can alternate the ice and heat. Talk with your healthcare provider to find out the best treatment or therapy for your back pain.  · Therapeutic massage will help relax the back muscles without stretching them.  · Be aware of safe lifting methods. Do not lift anything over 15 pounds until all of the pain is gone.  Medicines  Talk to your healthcare provider before using medicines, especially if you have other health problems or are taking  other medicines.  · You may use acetaminophen or ibuprofen to control pain, unless another pain medicine was prescribed. If you have chronic conditions like diabetes, liver or kidney disease, stomach ulcers, or gastrointestinal bleeding, or are taking blood-thinner medicines, talk with your doctor before taking any medicines.  · Be careful if you are given prescription medicines, narcotics, or medicine for muscle spasm. They can cause drowsiness, and affect your coordination, reflexes, and judgment. Do not drive or operate heavy machinery when taking these types of medicines. Only take pain medicine as prescribed by your healthcare provider.  Follow-up care  Follow up with your healthcare provider, or as advised. You may need physical therapy or more tests if your symptoms get worse.  If you had X-rays your healthcare provider may be checking for any broken bones, breaks, or fractures. Bruises and sprains can sometimes hurt as much as a fracture. These injuries can take time to heal completely. If your symptoms dont improve or they get worse, talk with your healthcare provider. You may need a repeat X-ray or other tests.  Call 911  Call for emergency care if any of the following occur:  · Trouble breathing  · Confused  · Very drowsy or trouble awakening  · Fainting or loss of consciousness  · Rapid or very slow heart rate  · Loss of bowel or bladder control  When to seek medical advice  Call your healthcare provider right away if any of the following occur:  · Pain gets worse or spreads to your arms or legs  · Weakness or numbness in one or both arms or legs  · Numbness in the groin or genital area  Date Last Reviewed: 6/1/2016  © 2037-4816 [employer]. [address] [place] [address], [place] [address]. All rights reserved. This information is not intended as a substitute for professional medical care. Always follow your healthcare professional's instructions.

## 2018-09-06 NOTE — PROGRESS NOTES
"Subjective:       Patient ID: Radha Mike is a 53 y.o. female.    Chief Complaint: Back Pain (x5)    HPI Mrs. Mike is a 53-year-old female who presents with a chief complaint of back pain.  She is a patient of Dr. Chang Orlando and today is her 1st visit with me.  Patient states that she has had back pain since Saturday night.  At 1st she thought it was gas pain and took medications to treat this such as baking soda, Gas-X, and "Komboocha".  However she did not get any relief of her pain.  When asked to locate her pain, she points to the left side of the upper back to mid back region.  Movement worsens her pain.  Stretching helps her pain.  She used the medications listed above, with no relief of her symptoms.   Nothing makes the pain go away completely.  Her pain is now 7/10 in severity.  She has also tried taking 2 Aleve but this did not help her pain.  She changed her diet when she was recently out of town and she was wondering if this diet change which consisted of increased carbohydrates caused her to have increased gas and thus pain.  She also has pain in the right ear.    Review of Systems   HENT: Positive for ear pain.    Musculoskeletal: Positive for back pain.       Objective:      Physical Exam   Constitutional: She is oriented to person, place, and time. She appears well-developed and well-nourished. No distress.   HENT:   Head: Normocephalic and atraumatic.   Right Ear: External ear and ear canal normal.   Left Ear: Tympanic membrane, external ear and ear canal normal.   Nose: Nose normal.     Right ear impacted with cerumen.   Eyes: Conjunctivae and EOM are normal.   Cardiovascular: Normal rate, regular rhythm, normal heart sounds and intact distal pulses. Exam reveals no gallop and no friction rub.   No murmur heard.  Pulmonary/Chest: Effort normal and breath sounds normal. No stridor. No respiratory distress. She has no wheezes. She has no rales.   Musculoskeletal:        Back:      Area of " pain as highlighted on the diagram.  No visible erythema, edema, ecchymosis, or deformity noted.  No warmth.  Patient had worsening of pain with deep palpation.   Neurological: She is alert and oriented to person, place, and time.   Skin: Skin is warm and dry. She is not diaphoretic.   Psychiatric: She has a normal mood and affect. Her behavior is normal. Judgment and thought content normal.   Vitals reviewed.      Assessment:       1. Back strain, initial encounter    2. Impacted cerumen of right ear        Plan:       1. Back strain  Recommend applying heat to the site.    Continue movement and massage to the site  Naproxen 500 mg p.o. B.i.d. P.r.n. Pain  Flexeril 5 mg p.o. T.i.d. P.r.n. Pain  Instructions given regarding back strain (see AVS).      2. Impacted cerumen of right ear  Ear wash out performed in clinic     RTC PRN

## 2018-11-12 ENCOUNTER — TELEPHONE (OUTPATIENT)
Dept: FAMILY MEDICINE | Facility: CLINIC | Age: 53
End: 2018-11-12

## 2018-11-12 DIAGNOSIS — T75.3XXA SEA SICKNESS, INITIAL ENCOUNTER: Primary | ICD-10-CM

## 2018-11-12 RX ORDER — SCOLOPAMINE TRANSDERMAL SYSTEM 1 MG/1
1 PATCH, EXTENDED RELEASE TRANSDERMAL
Qty: 4 PATCH | Refills: 1 | Status: SHIPPED | OUTPATIENT
Start: 2018-11-12 | End: 2019-09-16

## 2018-11-12 NOTE — TELEPHONE ENCOUNTER
----- Message from Jolie Roger sent at 11/12/2018  9:36 AM CST -----  Contact: Self [phone]  Patient is requesting to see if the doctor can call in medication for sea sick patches since she is going on a cruise. [facility] [id number] [place] [address] [address] AT [facility] [address] [phone] (Phone)  [phone] (Fax)

## 2018-12-07 DIAGNOSIS — E03.9 ACQUIRED HYPOTHYROIDISM: ICD-10-CM

## 2018-12-07 NOTE — TELEPHONE ENCOUNTER
----- Message from Caty Perales sent at 12/7/2018 11:10 AM CST -----  Contact: [phone]/self  Patient is requesting to contact insurance that she can not take the generic version of   levothyroxine (SYNTHROID, LEVOTHROID) 175 MCG tablet.   She would also like a 90 day supply.       sent to [facility] [address] [place] [address], [place] - [address] AT [facility] . Please advise.

## 2018-12-08 RX ORDER — LEVOTHYROXINE SODIUM 175 UG/1
175 TABLET ORAL DAILY
Qty: 90 TABLET | Refills: 0 | Status: SHIPPED | OUTPATIENT
Start: 2018-12-08 | End: 2019-08-22 | Stop reason: SDUPTHER

## 2019-08-22 ENCOUNTER — TELEPHONE (OUTPATIENT)
Dept: FAMILY MEDICINE | Facility: CLINIC | Age: 54
End: 2019-08-22

## 2019-08-22 DIAGNOSIS — R73.01 IFG (IMPAIRED FASTING GLUCOSE): ICD-10-CM

## 2019-08-22 DIAGNOSIS — E03.9 ACQUIRED HYPOTHYROIDISM: ICD-10-CM

## 2019-08-22 DIAGNOSIS — Z13.220 LIPID SCREENING: ICD-10-CM

## 2019-08-22 DIAGNOSIS — E55.9 VITAMIN D DEFICIENCY: ICD-10-CM

## 2019-08-22 DIAGNOSIS — D64.9 ANEMIA, UNSPECIFIED TYPE: ICD-10-CM

## 2019-08-22 DIAGNOSIS — E03.9 ACQUIRED HYPOTHYROIDISM: Primary | ICD-10-CM

## 2019-08-22 RX ORDER — LEVOTHYROXINE SODIUM 175 UG/1
175 TABLET ORAL DAILY
Qty: 30 TABLET | Refills: 0 | Status: SHIPPED | OUTPATIENT
Start: 2019-08-22 | End: 2019-09-17 | Stop reason: DRUGHIGH

## 2019-08-22 NOTE — TELEPHONE ENCOUNTER
Called and spoke with pt in regards of medication refill being sent to pharmacy. Pt made appt to establish care with SYDNIE Lennon on 08/28/2019.    Please put in lab orders for pt please. Once done I will link them. Thanks

## 2019-08-22 NOTE — TELEPHONE ENCOUNTER
----- Message from EMIR Del Castillo sent at 8/22/2019  1:59 PM CDT -----  Patient needs appt to establish care with myself or another provider. If she would like to see me, I prefer labs prior to appt.

## 2019-08-23 ENCOUNTER — LAB VISIT (OUTPATIENT)
Dept: LAB | Facility: HOSPITAL | Age: 54
End: 2019-08-23
Attending: NURSE PRACTITIONER
Payer: COMMERCIAL

## 2019-08-23 DIAGNOSIS — R73.01 IFG (IMPAIRED FASTING GLUCOSE): ICD-10-CM

## 2019-08-23 DIAGNOSIS — E55.9 VITAMIN D DEFICIENCY: ICD-10-CM

## 2019-08-23 DIAGNOSIS — E03.9 ACQUIRED HYPOTHYROIDISM: ICD-10-CM

## 2019-08-23 DIAGNOSIS — D64.9 ANEMIA, UNSPECIFIED TYPE: ICD-10-CM

## 2019-08-23 DIAGNOSIS — Z13.220 LIPID SCREENING: ICD-10-CM

## 2019-08-23 LAB
25(OH)D3+25(OH)D2 SERPL-MCNC: 14 NG/ML (ref 30–96)
ALBUMIN SERPL BCP-MCNC: 3.8 G/DL (ref 3.5–5.2)
ALP SERPL-CCNC: 76 U/L (ref 38–126)
ALT SERPL W/O P-5'-P-CCNC: 13 U/L (ref 10–44)
ANION GAP SERPL CALC-SCNC: 2 MMOL/L (ref 8–16)
AST SERPL-CCNC: 19 U/L (ref 15–46)
BASOPHILS # BLD AUTO: 0.01 K/UL (ref 0–0.2)
BASOPHILS NFR BLD: 0.1 % (ref 0–1.9)
BILIRUB SERPL-MCNC: 0.4 MG/DL (ref 0.1–1)
BUN SERPL-MCNC: 10 MG/DL (ref 7–17)
CALCIUM SERPL-MCNC: 9.3 MG/DL (ref 8.7–10.5)
CHLORIDE SERPL-SCNC: 106 MMOL/L (ref 95–110)
CHOLEST SERPL-MCNC: 137 MG/DL (ref 120–199)
CHOLEST/HDLC SERPL: 2.7 {RATIO} (ref 2–5)
CO2 SERPL-SCNC: 32 MMOL/L (ref 23–29)
CREAT SERPL-MCNC: 0.79 MG/DL (ref 0.5–1.4)
DIFFERENTIAL METHOD: ABNORMAL
EOSINOPHIL # BLD AUTO: 0.3 K/UL (ref 0–0.5)
EOSINOPHIL NFR BLD: 3.5 % (ref 0–8)
ERYTHROCYTE [DISTWIDTH] IN BLOOD BY AUTOMATED COUNT: 17.1 % (ref 11.5–14.5)
EST. GFR  (AFRICAN AMERICAN): >60 ML/MIN/1.73 M^2
EST. GFR  (NON AFRICAN AMERICAN): >60 ML/MIN/1.73 M^2
ESTIMATED AVG GLUCOSE: 126 MG/DL (ref 68–131)
FERRITIN SERPL-MCNC: 61 NG/ML (ref 20–300)
GLUCOSE SERPL-MCNC: 109 MG/DL (ref 70–110)
HBA1C MFR BLD HPLC: 6 % (ref 4–5.6)
HCT VFR BLD AUTO: 36.7 % (ref 37–48.5)
HDLC SERPL-MCNC: 50 MG/DL (ref 40–75)
HDLC SERPL: 36.5 % (ref 20–50)
HGB BLD-MCNC: 11.6 G/DL (ref 12–16)
IRON SERPL-MCNC: 60 UG/DL (ref 30–160)
LDLC SERPL CALC-MCNC: 76.8 MG/DL (ref 63–159)
LYMPHOCYTES # BLD AUTO: 2.2 K/UL (ref 1–4.8)
LYMPHOCYTES NFR BLD: 27.3 % (ref 18–48)
MCH RBC QN AUTO: 23.1 PG (ref 27–31)
MCHC RBC AUTO-ENTMCNC: 31.6 G/DL (ref 32–36)
MCV RBC AUTO: 73 FL (ref 82–98)
MONOCYTES # BLD AUTO: 0.5 K/UL (ref 0.3–1)
MONOCYTES NFR BLD: 6.4 % (ref 4–15)
NEUTROPHILS # BLD AUTO: 5 K/UL (ref 1.8–7.7)
NEUTROPHILS NFR BLD: 62.7 % (ref 38–73)
NONHDLC SERPL-MCNC: 87 MG/DL
PLATELET # BLD AUTO: 301 K/UL (ref 150–350)
PMV BLD AUTO: 11 FL (ref 9.2–12.9)
POTASSIUM SERPL-SCNC: 4.3 MMOL/L (ref 3.5–5.1)
PROT SERPL-MCNC: 7.4 G/DL (ref 6–8.4)
RBC # BLD AUTO: 5.03 M/UL (ref 4–5.4)
SATURATED IRON: 17 % (ref 20–50)
SODIUM SERPL-SCNC: 140 MMOL/L (ref 136–145)
T4 FREE SERPL-MCNC: 0.8 NG/DL (ref 0.71–1.51)
TOTAL IRON BINDING CAPACITY: 351 UG/DL (ref 250–450)
TRANSFERRIN SERPL-MCNC: 237 MG/DL (ref 200–375)
TRIGL SERPL-MCNC: 51 MG/DL (ref 30–150)
TSH SERPL DL<=0.005 MIU/L-ACNC: 2.91 UIU/ML (ref 0.4–4)
WBC # BLD AUTO: 7.98 K/UL (ref 3.9–12.7)

## 2019-08-23 PROCEDURE — 80053 COMPREHEN METABOLIC PANEL: CPT | Mod: PO

## 2019-08-23 PROCEDURE — 84439 ASSAY OF FREE THYROXINE: CPT

## 2019-08-23 PROCEDURE — 82306 VITAMIN D 25 HYDROXY: CPT | Mod: PO

## 2019-08-23 PROCEDURE — 83036 HEMOGLOBIN GLYCOSYLATED A1C: CPT

## 2019-08-23 PROCEDURE — 36415 COLL VENOUS BLD VENIPUNCTURE: CPT | Mod: PO

## 2019-08-23 PROCEDURE — 85025 COMPLETE CBC W/AUTO DIFF WBC: CPT | Mod: PO

## 2019-08-23 PROCEDURE — 84443 ASSAY THYROID STIM HORMONE: CPT | Mod: PO

## 2019-08-23 PROCEDURE — 83540 ASSAY OF IRON: CPT | Mod: PO

## 2019-08-23 PROCEDURE — 82728 ASSAY OF FERRITIN: CPT

## 2019-08-23 PROCEDURE — 80061 LIPID PANEL: CPT

## 2019-09-11 ENCOUNTER — TELEPHONE (OUTPATIENT)
Dept: FAMILY MEDICINE | Facility: CLINIC | Age: 54
End: 2019-09-11

## 2019-09-11 NOTE — TELEPHONE ENCOUNTER
Called and spoke with pt in regards of rescheduling her appt. Pt made an appt on 9/16/2019 to see SYDNIE Lennon.

## 2019-09-11 NOTE — TELEPHONE ENCOUNTER
----- Message from EMIR Del Castillo sent at 9/11/2019 11:28 AM CDT -----  Please call patient to reschedule new patient (40 min) visit. thanks

## 2019-09-16 ENCOUNTER — OFFICE VISIT (OUTPATIENT)
Dept: FAMILY MEDICINE | Facility: CLINIC | Age: 54
End: 2019-09-16
Payer: COMMERCIAL

## 2019-09-16 VITALS
OXYGEN SATURATION: 98 % | TEMPERATURE: 98 F | DIASTOLIC BLOOD PRESSURE: 76 MMHG | BODY MASS INDEX: 44.41 KG/M2 | HEIGHT: 68 IN | SYSTOLIC BLOOD PRESSURE: 132 MMHG | HEART RATE: 92 BPM | WEIGHT: 293 LBS

## 2019-09-16 DIAGNOSIS — E55.9 VITAMIN D DEFICIENCY: ICD-10-CM

## 2019-09-16 DIAGNOSIS — E66.01 MORBID OBESITY WITH BMI OF 50.0-59.9, ADULT: ICD-10-CM

## 2019-09-16 DIAGNOSIS — Z23 NEED FOR TDAP VACCINATION: ICD-10-CM

## 2019-09-16 DIAGNOSIS — E03.9 ACQUIRED HYPOTHYROIDISM: Primary | ICD-10-CM

## 2019-09-16 DIAGNOSIS — Z12.39 BREAST CANCER SCREENING: ICD-10-CM

## 2019-09-16 PROCEDURE — 99999 PR PBB SHADOW E&M-EST. PATIENT-LVL IV: CPT | Mod: PBBFAC,,, | Performed by: NURSE PRACTITIONER

## 2019-09-16 PROCEDURE — 90471 TDAP VACCINE GREATER THAN OR EQUAL TO 7YO IM: ICD-10-PCS | Mod: S$GLB,,, | Performed by: NURSE PRACTITIONER

## 2019-09-16 PROCEDURE — 99214 PR OFFICE/OUTPT VISIT, EST, LEVL IV, 30-39 MIN: ICD-10-PCS | Mod: 25,S$GLB,, | Performed by: NURSE PRACTITIONER

## 2019-09-16 PROCEDURE — 99999 PR PBB SHADOW E&M-EST. PATIENT-LVL IV: ICD-10-PCS | Mod: PBBFAC,,, | Performed by: NURSE PRACTITIONER

## 2019-09-16 PROCEDURE — 90471 IMMUNIZATION ADMIN: CPT | Mod: S$GLB,,, | Performed by: NURSE PRACTITIONER

## 2019-09-16 PROCEDURE — 90715 TDAP VACCINE 7 YRS/> IM: CPT | Mod: S$GLB,,, | Performed by: NURSE PRACTITIONER

## 2019-09-16 PROCEDURE — 90715 TDAP VACCINE GREATER THAN OR EQUAL TO 7YO IM: ICD-10-PCS | Mod: S$GLB,,, | Performed by: NURSE PRACTITIONER

## 2019-09-16 PROCEDURE — 99214 OFFICE O/P EST MOD 30 MIN: CPT | Mod: 25,S$GLB,, | Performed by: NURSE PRACTITIONER

## 2019-09-16 RX ORDER — ERGOCALCIFEROL 1.25 MG/1
50000 CAPSULE ORAL
Qty: 8 CAPSULE | Refills: 0 | Status: SHIPPED | OUTPATIENT
Start: 2019-09-16 | End: 2022-03-28

## 2019-09-16 NOTE — PROGRESS NOTES
Subjective:       Patient ID: Radha Mike is a 54 y.o. female.    Chief Complaint: Establish Care    53 y/o female with history of thyroid disease and anemia presents to clinic to establish care and discuss lab results.    Patient has hypothyroidism. She is prescribed Synthroid 175 mcg daily. Current TSH and T4 normal; will continue current dose.    Patient has history of iron deficiency anemia. Current H&H 11.6 & 36.7 Denies chest pain, shortness of breath, fatigue, or melena. Patient stopped iron supplements 2/2 constipation.    No concerning symptoms.    Health maintenance  - Tdap due: will receive in office  - Influenza and shingles vaccine: patient declined  - Mammogram due: scheduled       Ref. Range 8/23/2019 08:39   WBC Latest Ref Range: 3.90 - 12.70 K/uL 7.98   RBC Latest Ref Range: 4.00 - 5.40 M/uL 5.03   Hemoglobin Latest Ref Range: 12.0 - 16.0 g/dL 11.6 (L)   Hematocrit Latest Ref Range: 37.0 - 48.5 % 36.7 (L)   MCV Latest Ref Range: 82 - 98 fL 73 (L)   MCH Latest Ref Range: 27.0 - 31.0 pg 23.1 (L)   MCHC Latest Ref Range: 32.0 - 36.0 g/dL 31.6 (L)   RDW Latest Ref Range: 11.5 - 14.5 % 17.1 (H)   Platelets Latest Ref Range: 150 - 350 K/uL 301   MPV Latest Ref Range: 9.2 - 12.9 fL 11.0   Gran% Latest Ref Range: 38.0 - 73.0 % 62.7   Gran # (ANC) Latest Ref Range: 1.8 - 7.7 K/uL 5.0   Lymph% Latest Ref Range: 18.0 - 48.0 % 27.3   Lymph # Latest Ref Range: 1.0 - 4.8 K/uL 2.2   Mono% Latest Ref Range: 4.0 - 15.0 % 6.4   Mono # Latest Ref Range: 0.3 - 1.0 K/uL 0.5   Eosinophil% Latest Ref Range: 0.0 - 8.0 % 3.5   Eos # Latest Ref Range: 0.0 - 0.5 K/uL 0.3   Basophil% Latest Ref Range: 0.0 - 1.9 % 0.1   Baso # Latest Ref Range: 0.00 - 0.20 K/uL 0.01   Differential Method Unknown Automated   Iron Latest Ref Range: 30 - 160 ug/dL 60   TIBC Latest Ref Range: 250 - 450 ug/dL 351   Saturated Iron Latest Ref Range: 20 - 50 % 17 (L)   Transferrin Latest Ref Range: 200 - 375 mg/dL 237   Ferritin Latest Ref Range:  20.0 - 300.0 ng/mL 61   Sodium Latest Ref Range: 136 - 145 mmol/L 140   Potassium Latest Ref Range: 3.5 - 5.1 mmol/L 4.3   Chloride Latest Ref Range: 95 - 110 mmol/L 106   CO2 Latest Ref Range: 23 - 29 mmol/L 32 (H)   Anion Gap Latest Ref Range: 8 - 16 mmol/L 2 (L)   BUN, Bld Latest Ref Range: 7 - 17 mg/dL 10   Creatinine Latest Ref Range: 0.50 - 1.40 mg/dL 0.79   eGFR if non African American Latest Ref Range: >60 mL/min/1.73 m^2 >60.0   eGFR if African American Latest Ref Range: >60 mL/min/1.73 m^2 >60.0   Glucose Latest Ref Range: 70 - 110 mg/dL 109   Calcium Latest Ref Range: 8.7 - 10.5 mg/dL 9.3   Alkaline Phosphatase Latest Ref Range: 38 - 126 U/L 76   PROTEIN TOTAL Latest Ref Range: 6.0 - 8.4 g/dL 7.4   Albumin Latest Ref Range: 3.5 - 5.2 g/dL 3.8   BILIRUBIN TOTAL Latest Ref Range: 0.1 - 1.0 mg/dL 0.4   AST Latest Ref Range: 15 - 46 U/L 19   ALT Latest Ref Range: 10 - 44 U/L 13   Triglycerides Latest Ref Range: 30 - 150 mg/dL 51   Cholesterol Latest Ref Range: 120 - 199 mg/dL 137   HDL Latest Ref Range: 40 - 75 mg/dL 50   Hdl/Cholesterol Ratio Latest Ref Range: 20.0 - 50.0 % 36.5   LDL Cholesterol External Latest Ref Range: 63.0 - 159.0 mg/dL 76.8   Non-HDL Cholesterol Latest Units: mg/dL 87   Total Cholesterol/HDL Ratio Latest Ref Range: 2.0 - 5.0  2.7   Vit D, 25-Hydroxy Latest Ref Range: 30 - 96 ng/mL 14 (L)   Hemoglobin A1C External Latest Ref Range: 4.0 - 5.6 % 6.0 (H)   Estimated Avg Glucose Latest Ref Range: 68 - 131 mg/dL 126   TSH Latest Ref Range: 0.400 - 4.000 uIU/mL 2.910   Free T4 Latest Ref Range: 0.71 - 1.51 ng/dL 0.80     Current Outpatient Medications   Medication Sig Dispense Refill    levothyroxine (SYNTHROID, LEVOTHROID) 175 MCG tablet Take 1 tablet (175 mcg total) by mouth once daily. 30 tablet 0    ergocalciferol (ERGOCALCIFEROL) 50,000 unit Cap Take 1 capsule (50,000 Units total) by mouth every 7 days. 8 capsule 0     No current facility-administered medications for this visit.         Past Medical History:   Diagnosis Date    Anemia     Arthritis     Psoriasis     Thyroid disease        Past Surgical History:   Procedure Laterality Date    ANKLE DEBRIDEMENT  2005    CHOLECYSTECTOMY  1988    COLONOSCOPY N/A 10/10/2014    [facility]illing [facility]    DILATION-CURETTAGE OF UTERUS (D AND C) N/A 6/12/2017    [facility] OR    HYSTEROSCOPY N/A 6/12/2017    [facility] OR    MYOMECTOMY-HYSTEROSCOPIC N/A 6/12/2017    [facility] OR    REMOVAL-POLYP N/A 6/12/2017    [facility] OR    TUBAL LIGATION  1996       Family History   Problem Relation Age of Onset    Diabetes Mother     Breast cancer Mother     Heart attack Mother     Heart disease Mother         CHF    Diabetes Father     Cancer Father         mouth    Stomach cancer Father     Arthritis Sister     No Known Problems Brother     No Known Problems Daughter     Hypothyroidism Son     Diabetes Sister        Social History     Socioeconomic History    Marital status:      Spouse name: Not on file    Number of children: Not on file    Years of education: Not on file    Highest education level: Not on file   Occupational History     Employer: [employer]   Social Needs    Financial resource strain: Not on file    Food insecurity:     Worry: Not on file     Inability: Not on file    Transportation needs:     Medical: Not on file     Non-medical: Not on file   Tobacco Use    Smoking status: Never Smoker    Smokeless tobacco: Never Used   Substance and Sexual Activity    Alcohol use: Yes     Comment: occasional    Drug use: No    Sexual activity: Yes     Partners: Male     Birth control/protection: Surgical   Lifestyle    Physical activity:     Days per week: Not on file     Minutes per session: Not on file    Stress: Not on file   Relationships    Social  "connections:     Talks on phone: Not on file     Gets together: Not on file     Attends Muslim service: Not on file     Active member of club or organization: Not on file     Attends meetings of clubs or organizations: Not on file     Relationship status: Not on file   Other Topics Concern    Not on file   Social History Narrative    Not on file       Review of Systems   Constitutional: Positive for unexpected weight change. Negative for fatigue and fever.   HENT: Negative for nosebleeds and sore throat.    Eyes: Negative for visual disturbance.   Respiratory: Negative for shortness of breath.    Cardiovascular: Negative for chest pain and palpitations.   Gastrointestinal: Negative for abdominal pain, blood in stool and constipation.   Musculoskeletal: Negative for back pain.   Allergic/Immunologic: Negative for environmental allergies and immunocompromised state.   Neurological: Negative for dizziness, weakness and headaches.   Hematological: Negative for adenopathy. Does not bruise/bleed easily.   Psychiatric/Behavioral: Negative for decreased concentration and dysphoric mood.         Objective:     Vitals:    09/16/19 1453   BP: 132/76   Pulse: 92   Temp: 97.5 °F (36.4 °C)   TempSrc: Oral   SpO2: 98%   Weight: (!) 165.6 kg (365 lb 3.1 oz)   Height: 5' 7.5" (1.715 m)          Physical Exam   Constitutional: She is oriented to person, place, and time. She appears well-developed and well-nourished.   HENT:   Head: Normocephalic.   Right Ear: Tympanic membrane and ear canal normal.   Left Ear: Tympanic membrane and ear canal normal.   Nose: Nose normal.   Mouth/Throat: Uvula is midline and oropharynx is clear and moist.   Eyes: Pupils are equal, round, and reactive to light.   Neck: Normal range of motion. Neck supple.   Cardiovascular: Normal rate and regular rhythm.   No murmur heard.  Pulmonary/Chest: Effort normal and breath sounds normal.   Abdominal: Soft. Bowel sounds are normal. There is no tenderness. "   Musculoskeletal: Normal range of motion.   Lymphadenopathy:     She has no cervical adenopathy.   Neurological: She is alert and oriented to person, place, and time.   Skin: Skin is warm and dry.   Psychiatric: She has a normal mood and affect. Her behavior is normal.         Assessment:         ICD-10-CM ICD-9-CM   1. Acquired hypothyroidism E03.9 244.9   2. Vitamin D deficiency E55.9 268.9   3. Morbid obesity with BMI of 50.0-59.9, adult E66.01 278.01    Z68.43 V85.43   4. Need for Tdap vaccination Z23 V06.1   5. Breast cancer screening Z12.31 V76.10       Plan:       Acquired hypothyroidism    Vitamin D deficiency  -     ergocalciferol (ERGOCALCIFEROL) 50,000 unit Cap; Take 1 capsule (50,000 Units total) by mouth every 7 days.  Dispense: 8 capsule; Refill: 0    Morbid obesity with BMI of 50.0-59.9, adult  -     [facility]ulatory Referral to [facility] ([facility])  -     [facility] ASSIGN QUESTION[facility] SERIES ([facility])  -     [facility] Patient Entered [facility] ([facility])    Need for Tdap vaccination  -     (In Office Administered) Tdap Vaccine    Breast cancer screening  -     Mammo Digital Screening Bilat; Future; Expected date: 09/16/2019      Follow up in about 6 months (around 3/16/2020).     Patient's Medications   New Prescriptions    ERGOCALCIFEROL (ERGOCALCIFEROL) 50,000 UNIT CAP    Take 1 capsule (50,000 Units total) by mouth every 7 days.   Previous Medications    LEVOTHYROXINE (SYNTHROID, LEVOTHROID) 175 MCG TABLET    Take 1 tablet (175 mcg total) by mouth once daily.   Modified Medications    No medications on file   Discontinued Medications    NAPROXEN (EC NAPROSYN) 500 MG EC TABLET    Take 1 tablet (500 mg total) by mouth 2 (two) times daily as needed (back pain).    SCOPOLAMINE (TRANSDERM-SCOP) 1.3-1.5 MG (1 MG OVER 3 DAYS)    Place 1 patch onto the skin every 72 hours.

## 2019-09-17 RX ORDER — LEVOTHYROXINE SODIUM 175 UG/1
175 TABLET ORAL DAILY
Qty: 30 TABLET | Refills: 5 | Status: SHIPPED | OUTPATIENT
Start: 2019-09-17 | End: 2020-07-02 | Stop reason: SDUPTHER

## 2019-09-17 RX ORDER — LEVOTHYROXINE SODIUM 175 UG/1
175 TABLET ORAL DAILY
COMMUNITY
End: 2019-09-17 | Stop reason: SDUPTHER

## 2019-09-17 NOTE — TELEPHONE ENCOUNTER
Called patient she states she has trouble when she takes the generic please send a script for brand only.

## 2019-09-17 NOTE — TELEPHONE ENCOUNTER
----- Message from Gisela Goncalves sent at 9/17/2019  3:03 PM CDT -----  Contact: [phone]/self  Patient requesting to speak with you regarding changing her prescription levothyroxine (SYNTHROID, LEVOTHROID) 175 MCG tablet. Please advise.

## 2019-09-19 ENCOUNTER — HOSPITAL ENCOUNTER (OUTPATIENT)
Dept: RADIOLOGY | Facility: HOSPITAL | Age: 54
Discharge: HOME | End: 2019-09-19
Attending: NURSE PRACTITIONER
Payer: COMMERCIAL

## 2019-09-19 DIAGNOSIS — Z12.39 BREAST CANCER SCREENING: ICD-10-CM

## 2019-09-19 PROCEDURE — 77067 SCR MAMMO BI INCL CAD: CPT | Mod: TC,PO

## 2020-07-02 ENCOUNTER — OFFICE VISIT (OUTPATIENT)
Dept: FAMILY MEDICINE | Facility: CLINIC | Age: 55
End: 2020-07-02
Payer: COMMERCIAL

## 2020-07-02 ENCOUNTER — TELEPHONE (OUTPATIENT)
Dept: FAMILY MEDICINE | Facility: CLINIC | Age: 55
End: 2020-07-02

## 2020-07-02 VITALS
OXYGEN SATURATION: 98 % | DIASTOLIC BLOOD PRESSURE: 86 MMHG | BODY MASS INDEX: 44.41 KG/M2 | HEIGHT: 68 IN | SYSTOLIC BLOOD PRESSURE: 132 MMHG | WEIGHT: 293 LBS | TEMPERATURE: 97 F | HEART RATE: 80 BPM

## 2020-07-02 DIAGNOSIS — Z00.00 HEALTH CARE MAINTENANCE: ICD-10-CM

## 2020-07-02 DIAGNOSIS — D50.0 IRON DEFICIENCY ANEMIA DUE TO CHRONIC BLOOD LOSS: ICD-10-CM

## 2020-07-02 DIAGNOSIS — Z12.31 ENCOUNTER FOR SCREENING MAMMOGRAM FOR MALIGNANT NEOPLASM OF BREAST: ICD-10-CM

## 2020-07-02 DIAGNOSIS — R73.02 IMPAIRED GLUCOSE TOLERANCE: ICD-10-CM

## 2020-07-02 DIAGNOSIS — E03.9 ACQUIRED HYPOTHYROIDISM: Primary | ICD-10-CM

## 2020-07-02 DIAGNOSIS — E55.9 VITAMIN D DEFICIENCY: ICD-10-CM

## 2020-07-02 DIAGNOSIS — E03.9 ACQUIRED HYPOTHYROIDISM: ICD-10-CM

## 2020-07-02 PROCEDURE — 99999 PR PBB SHADOW E&M-EST. PATIENT-LVL IV: ICD-10-PCS | Mod: PBBFAC,,, | Performed by: INTERNAL MEDICINE

## 2020-07-02 PROCEDURE — 99999 PR PBB SHADOW E&M-EST. PATIENT-LVL IV: CPT | Mod: PBBFAC,,, | Performed by: INTERNAL MEDICINE

## 2020-07-02 PROCEDURE — 99214 OFFICE O/P EST MOD 30 MIN: CPT | Mod: S$GLB,,, | Performed by: INTERNAL MEDICINE

## 2020-07-02 PROCEDURE — 99214 PR OFFICE/OUTPT VISIT, EST, LEVL IV, 30-39 MIN: ICD-10-PCS | Mod: S$GLB,,, | Performed by: INTERNAL MEDICINE

## 2020-07-02 RX ORDER — LEVOTHYROXINE SODIUM 175 UG/1
175 TABLET ORAL DAILY
Qty: 90 TABLET | Refills: 1 | Status: SHIPPED | OUTPATIENT
Start: 2020-07-02 | End: 2021-02-10 | Stop reason: SDUPTHER

## 2020-07-02 RX ORDER — LEVOTHYROXINE SODIUM 175 UG/1
175 TABLET ORAL DAILY
Qty: 30 TABLET | Refills: 5 | Status: SHIPPED | OUTPATIENT
Start: 2020-07-02 | End: 2020-07-02 | Stop reason: SDUPTHER

## 2020-07-02 NOTE — PROGRESS NOTES
[facility] Note    Chief Complaint      Chief Complaint   Patient presents with    Annual Exam     pt to for Annual & est care- pt of SYDNIE Lennon     History of Present Illness      Radha Mike is a 55 y.o. female who presents today for chief complaint follow up hypothyroid. Patient is new to me.    PCP: Florence Abdi MD  Patient comes to appointment alone.     Active Problem List with Overview Notes    Diagnosis Date Noted    Vitamin D deficiency 07/02/2020     On ergo   Never above 20      Impaired glucose tolerance 07/02/2020     Did  Not tolerate metformin in past       Abnormal uterine bleeding (AUB) 06/12/2017    Refusal of blood transfusions as patient is Religion 06/08/2017    Hypothyroidism 06/12/2014     Has gland, has been on this dose a long time, stable tfts in 8.2019  Feels euthyroid   Sometimes misses doses       BMI 50.0-59.9, adult 06/12/2014     Knows she needs to lose weight   Not interested in bariatric surgery   Has struggled with weight her whole life          HPI     Health Maintenance   Topic Date Due    Mammogram  09/19/2020    Lipid Panel  08/12/2025    TETANUS VACCINE  09/16/2029    Hepatitis C Screening  Completed       Past Medical History:   Diagnosis Date    Anemia     Arthritis     Psoriasis     Thyroid disease        Past Surgical History:   Procedure Laterality Date    ANKLE DEBRIDEMENT  2005    CHOLECYSTECTOMY  1988    TUBAL LIGATION  1996       family history includes Arthritis in her sister; Breast cancer in her mother; Cancer in her father; Diabetes in her father, mother, and sister; Heart attack in her mother; Heart disease in her mother; Hypothyroidism in her son; No Known Problems in her brother and daughter; Stomach cancer in her father.    Social History     Tobacco Use    Smoking status: Never Smoker    Smokeless tobacco: Never Used   Substance Use Topics    Alcohol use: Yes     Comment: occasional     "Drug use: No       Review of Systems   Constitutional: Positive for malaise/fatigue. Negative for chills and fever.   HENT: Negative for congestion and sore throat.    Eyes: Negative for blurred vision and discharge.   Respiratory: Negative for cough and shortness of breath.    Cardiovascular: Negative for chest pain and palpitations.   Gastrointestinal: Negative for constipation, diarrhea, nausea and vomiting.   Genitourinary: Negative for dysuria and hematuria.   Musculoskeletal: Negative for falls and myalgias.   Skin: Negative for itching and rash.   Neurological: Negative for dizziness and headaches.        Outpatient Encounter Medications as of 7/2/2020   Medication Sig Dispense Refill    [DISCONTINUED] levothyroxine (SYNTHROID) 175 MCG tablet Take 1 tablet (175 mcg total) by mouth once daily. BRAND NAME ONLY 30 tablet 5    [DISCONTINUED] levothyroxine (SYNTHROID) 175 MCG tablet Take 1 tablet (175 mcg total) by mouth once daily. BRAND NAME ONLY 30 tablet 5    ergocalciferol (ERGOCALCIFEROL) 50,000 unit Cap Take 1 capsule (50,000 Units total) by mouth every 7 days. (Patient not taking: Reported on 7/2/2020) 8 capsule 0     No facility-administered encounter medications on file as of 7/2/2020.         Review of patient's allergies indicates:   Allergen Reactions    Codeine Hives and Other (See Comments)     Other reaction(s): Hives  Stomach feels raw           Physical Exam      Vital Signs  Temp: 97.1 °F (36.2 °C)  Temp src: Oral  Pulse: 80  SpO2: 98 %  BP: 132/86  Pain Score: 0-No pain  Height and Weight  Height: 5' 7.5" (171.5 cm)  Weight: (!) 162.4 kg (357 lb 14.7 oz)  BSA (Calculated - sq m): 2.78 sq meters  BMI (Calculated): 55.2  Weight in (lb) to have BMI = 25: 161.7]    Physical Exam  Vitals signs reviewed.   Constitutional:       Appearance: She is well-developed. She is obese.   HENT:      Head: Normocephalic and atraumatic.      Right Ear: External ear normal.      Left Ear: External ear normal. "   Eyes:      General:         Right eye: No discharge.         Left eye: No discharge.   Neck:      Musculoskeletal: Normal range of motion.      Thyroid: No thyromegaly.   Cardiovascular:      Rate and Rhythm: Normal rate and regular rhythm.      Heart sounds: Normal heart sounds. No murmur.   Pulmonary:      Effort: Pulmonary effort is normal. No respiratory distress.      Breath sounds: Normal breath sounds.   Abdominal:      General: Bowel sounds are normal. There is no distension.      Palpations: Abdomen is soft.      Tenderness: There is no abdominal tenderness.   Musculoskeletal: Normal range of motion.         General: No deformity.   Skin:     General: Skin is warm and dry.      Findings: No rash.   Neurological:      Mental Status: She is alert and oriented to person, place, and time.   Psychiatric:         Behavior: Behavior normal.          Laboratory:  Labs 2019 reviewed   Radiology:      Assessment/Plan     Radha Mike is a 55 y.o.female with:    Hypothyroidism  Due for labs and ok to refill     Impaired glucose tolerance  Due for recheck     Vitamin D deficiency  Recheck and may need to eval for underlying cause unable to correct   Start 800 iu daily     BMI 50.0-59.9, adult  Will ref to bariatrics       Orders Placed This Encounter   Procedures    Mammo Digital Screening Bilat w/ Brody     Standing Status:   Future     Standing Expiration Date:   9/2/2021     Order Specific Question:   May the Radiologist modify the order per protocol to meet the clinical needs of the patient?     Answer:   Yes    T4, free     Standing Status:   Future     Number of Occurrences:   1     Standing Expiration Date:   9/2/2021    TSH     Standing Status:   Future     Number of Occurrences:   1     Standing Expiration Date:   9/2/2021    CBC auto differential     Standing Status:   Future     Number of Occurrences:   1     Standing Expiration Date:   8/31/2021    Lipid Panel     Standing Status:   Future      Number of Occurrences:   1     Standing Expiration Date:   8/31/2021    Comprehensive metabolic panel     Standing Status:   Future     Number of Occurrences:   1     Standing Expiration Date:   8/31/2021    Vitamin D     Standing Status:   Future     Number of Occurrences:   1     Standing Expiration Date:   8/31/2021    Hemoglobin A1C     Standing Status:   Future     Number of Occurrences:   1     Standing Expiration Date:   8/31/2021    Iron and TIBC     Standing Status:   Future     Number of Occurrences:   1     Standing Expiration Date:   8/31/2021    Ferritin     Standing Status:   Future     Number of Occurrences:   1     Standing Expiration Date:   8/31/2021    Ambulatory referral/consult to Bariatric Medicine     Standing Status:   Future     Standing Expiration Date:   8/2/2021     Referral Priority:   Routine     Referral Type:   Consultation     Referral Reason:   Specialty Services Required     Requested Specialty:   Bariatrics     Number of Visits Requested:   1       -Continue current medications and maintain follow up with specialists.  Return to clinic in 1 months.  Future Appointments   Date Time Provider Department Center   8/13/2020  1:30 PM Florence Abdi MD [facility] [place]   9/21/2020  1:00 PM [facility] [place]   9/22/2020  3:00 PM Tony Chavez MD [facility] MED [place] Abdi MD  8/13/2020 3:41 PM    Primary Care Internal Medicine - [facility]

## 2020-07-02 NOTE — TELEPHONE ENCOUNTER
----- Message from Tess Deras sent at 7/2/2020 11:11 AM CDT -----  Contact: SELF [phone]  Patient would like to speak with you about being seen today along with her  MRN [id number] [place] Mike for med refills due to previous Dr stanley. Please advise.

## 2020-07-02 NOTE — TELEPHONE ENCOUNTER
Spoke with pt in regards of her message. Pt called to get an appt today to be seen for medication refill. Informed pt that she has an appt today to see Dr. Abdi for 3:00pm. Pt verbalized understanding of message will come for her appt today.

## 2020-07-06 ENCOUNTER — TELEPHONE (OUTPATIENT)
Dept: OBSTETRICS AND GYNECOLOGY | Facility: CLINIC | Age: 55
End: 2020-07-06

## 2020-07-06 NOTE — TELEPHONE ENCOUNTER
----- Message from Florence Abdi MD sent at 7/2/2020  3:27 PM CDT -----  Pt needs follow up for annual exam/pap

## 2020-07-07 ENCOUNTER — TELEPHONE (OUTPATIENT)
Dept: BARIATRICS | Facility: CLINIC | Age: 55
End: 2020-07-07

## 2020-07-07 ENCOUNTER — TELEPHONE (OUTPATIENT)
Dept: FAMILY MEDICINE | Facility: CLINIC | Age: 55
End: 2020-07-07

## 2020-07-07 NOTE — TELEPHONE ENCOUNTER
Called pt. Regarding appointment she had scheduled today!. Informed pt of plan not covering for bariatrics. Referral was denied. Offered self pay option! Patient declined. Appointment has been canceled.

## 2020-07-07 NOTE — TELEPHONE ENCOUNTER
----- Message from Cheryl Schwartz LPN sent at 7/7/2020  4:02 PM CDT -----    ----- Message -----  From: Radha Vega  Sent: 7/1/2020  12:17 PM CDT  To: Saji Willams Staff, Kathy Jimenez Staff    Type:  RX Refill Request    Who Called:  Radha  Refill or New Rx: refill  RX Name and Strength: levothyroxine (SYNTHROID) 175 MCG tablet  How is the patient currently taking it? (ex. 1XDay): 1 x day  Is this a 30 day or 90 day RX: 90 day  Preferred Pharmacy with phone number: [id number] - [place] [id number] [facility] AT [facility] [address] [phone] (Phone)  [phone] (Fax)  Local or Mail Order: local  Ordering Provider: EMIR Del Castillo  Would the patient rather a call back or a response via [facility]?  Call back  Best Call Back Number: [phone]  Additional Information:  pt scheduled appt with  for 9/22/2020, but will run out of medicine 7/1/2020

## 2020-07-16 ENCOUNTER — TELEPHONE (OUTPATIENT)
Dept: FAMILY MEDICINE | Facility: CLINIC | Age: 55
End: 2020-07-16

## 2020-07-16 NOTE — TELEPHONE ENCOUNTER
Called and spoke with pt in regards of her BARIATRIC referral not being covered by her insurance. Pt states that she was already informed by her insurance of this matter. Pt verbalized understanding of message.

## 2020-08-12 ENCOUNTER — LAB VISIT (OUTPATIENT)
Dept: LAB | Facility: HOSPITAL | Age: 55
End: 2020-08-12
Attending: INTERNAL MEDICINE
Payer: COMMERCIAL

## 2020-08-12 DIAGNOSIS — E55.9 VITAMIN D DEFICIENCY: ICD-10-CM

## 2020-08-12 DIAGNOSIS — D50.0 IRON DEFICIENCY ANEMIA DUE TO CHRONIC BLOOD LOSS: ICD-10-CM

## 2020-08-12 DIAGNOSIS — R73.02 IMPAIRED GLUCOSE TOLERANCE: ICD-10-CM

## 2020-08-12 DIAGNOSIS — Z00.00 HEALTH CARE MAINTENANCE: ICD-10-CM

## 2020-08-12 DIAGNOSIS — E03.9 ACQUIRED HYPOTHYROIDISM: ICD-10-CM

## 2020-08-12 LAB
25(OH)D3+25(OH)D2 SERPL-MCNC: 14 NG/ML (ref 30–96)
ALBUMIN SERPL BCP-MCNC: 4.1 G/DL (ref 3.5–5.2)
ALP SERPL-CCNC: 69 U/L (ref 38–126)
ALT SERPL W/O P-5'-P-CCNC: 11 U/L (ref 10–44)
ANION GAP SERPL CALC-SCNC: 6 MMOL/L (ref 8–16)
AST SERPL-CCNC: 18 U/L (ref 15–46)
BASOPHILS # BLD AUTO: 0.04 K/UL (ref 0–0.2)
BASOPHILS NFR BLD: 0.5 % (ref 0–1.9)
BILIRUB SERPL-MCNC: 0.4 MG/DL (ref 0.1–1)
BUN SERPL-MCNC: 15 MG/DL (ref 7–17)
CALCIUM SERPL-MCNC: 9.3 MG/DL (ref 8.7–10.5)
CHLORIDE SERPL-SCNC: 106 MMOL/L (ref 95–110)
CHOLEST SERPL-MCNC: 134 MG/DL (ref 120–199)
CHOLEST/HDLC SERPL: 2.6 {RATIO} (ref 2–5)
CO2 SERPL-SCNC: 29 MMOL/L (ref 23–29)
CREAT SERPL-MCNC: 0.79 MG/DL (ref 0.5–1.4)
DIFFERENTIAL METHOD: ABNORMAL
EOSINOPHIL # BLD AUTO: 0.3 K/UL (ref 0–0.5)
EOSINOPHIL NFR BLD: 3.5 % (ref 0–8)
ERYTHROCYTE [DISTWIDTH] IN BLOOD BY AUTOMATED COUNT: 15.5 % (ref 11.5–14.5)
EST. GFR  (AFRICAN AMERICAN): >60 ML/MIN/1.73 M^2
EST. GFR  (NON AFRICAN AMERICAN): >60 ML/MIN/1.73 M^2
ESTIMATED AVG GLUCOSE: 123 MG/DL (ref 68–131)
FERRITIN SERPL-MCNC: 65 NG/ML (ref 20–300)
GLUCOSE SERPL-MCNC: 107 MG/DL (ref 70–110)
HBA1C MFR BLD HPLC: 5.9 % (ref 4–5.6)
HCT VFR BLD AUTO: 37.9 % (ref 37–48.5)
HDLC SERPL-MCNC: 51 MG/DL (ref 40–75)
HDLC SERPL: 38.1 % (ref 20–50)
HGB BLD-MCNC: 11.6 G/DL (ref 12–16)
IMM GRANULOCYTES # BLD AUTO: 0.03 K/UL (ref 0–0.04)
IMM GRANULOCYTES NFR BLD AUTO: 0.4 % (ref 0–0.5)
IRON SERPL-MCNC: 50 UG/DL (ref 30–160)
LDLC SERPL CALC-MCNC: 73.6 MG/DL (ref 63–159)
LYMPHOCYTES # BLD AUTO: 2.4 K/UL (ref 1–4.8)
LYMPHOCYTES NFR BLD: 29.4 % (ref 18–48)
MCH RBC QN AUTO: 23 PG (ref 27–31)
MCHC RBC AUTO-ENTMCNC: 30.6 G/DL (ref 32–36)
MCV RBC AUTO: 75 FL (ref 82–98)
MONOCYTES # BLD AUTO: 0.5 K/UL (ref 0.3–1)
MONOCYTES NFR BLD: 6.3 % (ref 4–15)
NEUTROPHILS # BLD AUTO: 4.8 K/UL (ref 1.8–7.7)
NEUTROPHILS NFR BLD: 59.9 % (ref 38–73)
NONHDLC SERPL-MCNC: 83 MG/DL
NRBC BLD-RTO: 0 /100 WBC
PLATELET # BLD AUTO: 327 K/UL (ref 150–350)
PMV BLD AUTO: 11.6 FL (ref 9.2–12.9)
POTASSIUM SERPL-SCNC: 4.1 MMOL/L (ref 3.5–5.1)
PROT SERPL-MCNC: 7.8 G/DL (ref 6–8.4)
RBC # BLD AUTO: 5.04 M/UL (ref 4–5.4)
SATURATED IRON: 14 % (ref 20–50)
SODIUM SERPL-SCNC: 141 MMOL/L (ref 136–145)
T4 FREE SERPL-MCNC: 1.01 NG/DL (ref 0.71–1.51)
TOTAL IRON BINDING CAPACITY: 352 UG/DL (ref 250–450)
TRANSFERRIN SERPL-MCNC: 238 MG/DL (ref 200–375)
TRIGL SERPL-MCNC: 47 MG/DL (ref 30–150)
TSH SERPL DL<=0.005 MIU/L-ACNC: 0.74 UIU/ML (ref 0.4–4)
WBC # BLD AUTO: 8 K/UL (ref 3.9–12.7)

## 2020-08-12 PROCEDURE — 84439 ASSAY OF FREE THYROXINE: CPT

## 2020-08-12 PROCEDURE — 83540 ASSAY OF IRON: CPT | Mod: PO

## 2020-08-12 PROCEDURE — 83036 HEMOGLOBIN GLYCOSYLATED A1C: CPT

## 2020-08-12 PROCEDURE — 80053 COMPREHEN METABOLIC PANEL: CPT | Mod: PO

## 2020-08-12 PROCEDURE — 84443 ASSAY THYROID STIM HORMONE: CPT | Mod: PO

## 2020-08-12 PROCEDURE — 82306 VITAMIN D 25 HYDROXY: CPT | Mod: PO

## 2020-08-12 PROCEDURE — 36415 COLL VENOUS BLD VENIPUNCTURE: CPT | Mod: PO

## 2020-08-12 PROCEDURE — 82728 ASSAY OF FERRITIN: CPT

## 2020-08-12 PROCEDURE — 80061 LIPID PANEL: CPT

## 2020-08-12 PROCEDURE — 85025 COMPLETE CBC W/AUTO DIFF WBC: CPT | Mod: PO

## 2020-08-13 ENCOUNTER — OFFICE VISIT (OUTPATIENT)
Dept: FAMILY MEDICINE | Facility: CLINIC | Age: 55
End: 2020-08-13
Payer: COMMERCIAL

## 2020-08-13 VITALS
HEART RATE: 87 BPM | SYSTOLIC BLOOD PRESSURE: 132 MMHG | BODY MASS INDEX: 44.41 KG/M2 | HEIGHT: 68 IN | WEIGHT: 293 LBS | DIASTOLIC BLOOD PRESSURE: 84 MMHG | OXYGEN SATURATION: 99 % | TEMPERATURE: 97 F

## 2020-08-13 DIAGNOSIS — E66.01 MORBID OBESITY WITH BMI OF 50.0-59.9, ADULT: ICD-10-CM

## 2020-08-13 DIAGNOSIS — Z71.3 WEIGHT LOSS COUNSELING, ENCOUNTER FOR: Primary | ICD-10-CM

## 2020-08-13 PROCEDURE — 99213 OFFICE O/P EST LOW 20 MIN: CPT | Mod: S$GLB,,, | Performed by: INTERNAL MEDICINE

## 2020-08-13 PROCEDURE — 99213 PR OFFICE/OUTPT VISIT, EST, LEVL III, 20-29 MIN: ICD-10-PCS | Mod: S$GLB,,, | Performed by: INTERNAL MEDICINE

## 2020-08-13 PROCEDURE — 99999 PR PBB SHADOW E&M-EST. PATIENT-LVL III: ICD-10-PCS | Mod: PBBFAC,,, | Performed by: INTERNAL MEDICINE

## 2020-08-13 PROCEDURE — 99999 PR PBB SHADOW E&M-EST. PATIENT-LVL III: CPT | Mod: PBBFAC,,, | Performed by: INTERNAL MEDICINE

## 2020-08-13 NOTE — ASSESSMENT & PLAN NOTE
Discussed various options phentermine, saxenda, and their corresponding side effects.   Also referred to [facility]  Was not able to be seen by [facility] bariatrics or nutrition

## 2020-08-13 NOTE — PROGRESS NOTES
[facility] Note    Chief Complaint      Chief Complaint   Patient presents with    Follow-up     pt here for 6 week follow up.      History of Present Illness      Radha Mike is a 55 y.o. female who presents today for chief complaint follow up l ab results.     HPI   Discuss labs  Vit d better but still low   Thyroid labs stable   a1c better 5.9 from 6  Active Problem List with Overview Notes    Diagnosis Date Noted    Morbid obesity with BMI of 50.0-59.9, adult 09/08/2020    Weight loss counseling, encounter for 09/08/2020    Vitamin D deficiency 07/02/2020     On ergo   Never above 20      Impaired glucose tolerance 07/02/2020     Did  Not tolerate metformin in past       Abnormal uterine bleeding (AUB) 06/12/2017    Refusal of blood transfusions as patient is [facility] 06/08/2017    Hypothyroidism 06/12/2014     Has gland, has been on this dose a long time, stable tfts in 8.2019  Feels euthyroid   Sometimes misses doses       BMI 50.0-59.9, adult 06/12/2014     Knows she needs to lose weight   Not interested in bariatric surgery   Has struggled with weight her whole life          Health Maintenance   Topic Date Due    Mammogram  09/19/2020    Lipid Panel  08/12/2025    TETANUS VACCINE  09/16/2029    Hepatitis C Screening  Completed       Past Medical History:   Diagnosis Date    Anemia     Arthritis     Psoriasis     Thyroid disease        Past Surgical History:   Procedure Laterality Date    ANKLE DEBRIDEMENT  2005    CHOLECYSTECTOMY  1988    TUBAL LIGATION  1996       family history includes Arthritis in her sister; Breast cancer in her mother; Cancer in her father; Diabetes in her father, mother, and sister; Heart attack in her mother; Heart disease in her mother; Hypothyroidism in her son; No Known Problems in her brother and daughter; Stomach cancer in her father.    Social History     Tobacco Use    Smoking status: Never Smoker    Smokeless  "tobacco: Never Used   Substance Use Topics    Alcohol use: Yes     Comment: occasional    Drug use: No       Review of Systems   Constitutional: Negative for chills and fever.   HENT: Negative for congestion and sore throat.    Eyes: Negative for blurred vision and discharge.   Respiratory: Negative for cough and shortness of breath.    Cardiovascular: Negative for chest pain and palpitations.   Gastrointestinal: Negative for constipation, diarrhea, nausea and vomiting.   Genitourinary: Negative for dysuria and hematuria.   Musculoskeletal: Negative for falls and myalgias.   Skin: Negative for itching and rash.   Neurological: Negative for dizziness and headaches.        Outpatient Encounter Medications as of 8/13/2020   Medication Sig Dispense Refill    ergocalciferol (ERGOCALCIFEROL) 50,000 unit Cap Take 1 capsule (50,000 Units total) by mouth every 7 days. 8 capsule 0    SYNTHROID 175 mcg tablet Take 1 tablet (175 mcg total) by mouth once daily. BRAND NAME ONLY 90 tablet 1     No facility-administered encounter medications on file as of 8/13/2020.         Review of patient's allergies indicates:   Allergen Reactions    Codeine Hives and Other (See Comments)     Other reaction(s): Hives  Stomach feels raw           Physical Exam      Vital Signs  Temp: 97.1 °F (36.2 °C)  Temp src: Oral  Pulse: 87  SpO2: 99 %  BP: 132/84  Pain Score: 0-No pain  Height and Weight  Height: 5' 7.5" (171.5 cm)  Weight: (!) 163 kg (359 lb 7.4 oz)  BSA (Calculated - sq m): 2.79 sq meters  BMI (Calculated): 55.4  Weight in (lb) to have BMI = 25: 161.7]    Physical Exam  Vitals signs reviewed.   Constitutional:       Appearance: She is well-developed.   HENT:      Head: Normocephalic and atraumatic.      Right Ear: External ear normal.      Left Ear: External ear normal.   Eyes:      General:         Right eye: No discharge.         Left eye: No discharge.   Neck:      Musculoskeletal: Normal range of motion.      Thyroid: No " thyromegaly.   Cardiovascular:      Rate and Rhythm: Normal rate and regular rhythm.      Heart sounds: Normal heart sounds. No murmur.   Pulmonary:      Effort: Pulmonary effort is normal. No respiratory distress.      Breath sounds: Normal breath sounds.   Abdominal:      General: Bowel sounds are normal. There is no distension.      Palpations: Abdomen is soft.      Tenderness: There is no abdominal tenderness.   Musculoskeletal: Normal range of motion.         General: No deformity.   Skin:     General: Skin is warm and dry.      Findings: No rash.   Neurological:      Mental Status: She is alert and oriented to person, place, and time.   Psychiatric:         Behavior: Behavior normal.          Laboratory:  CBC:  Recent Labs   Lab Result Units 08/12/20  0850   WBC K/uL 8.00   RBC M/uL 5.04   Hemoglobin g/dL 11.6*   Hematocrit % 37.9   Platelets K/uL 327   Mean Corpuscular Volume fL 75*   Mean Corpuscular Hemoglobin pg 23.0*   Mean Corpuscular Hemoglobin Conc g/dL 30.6*     CMP:  Recent Labs   Lab Result Units 08/12/20  0850   Glucose mg/dL 107   Calcium mg/dL 9.3   Albumin g/dL 4.1   Total Protein g/dL 7.8   Sodium mmol/L 141   Potassium mmol/L 4.1   CO2 mmol/L 29   Chloride mmol/L 106   BUN, Bld mg/dL 15   Alkaline Phosphatase U/L 69   ALT U/L 11   AST U/L 18   Total Bilirubin mg/dL 0.4     URINALYSIS:  No results for input(s): COLORU, CLARITYU, SPECGRAV, PHUR, PROTEINUA, GLUCOSEU, BILIRUBINCON, BLOODU, WBCU, RBCU, BACTERIA, MUCUS, NITRITE, LEUKOCYTESUR, UROBILINOGEN, HYALINECASTS in the last 2160 hours.   LIPIDS:  Recent Labs   Lab Result Units 08/12/20  0850   TSH uIU/mL 0.740   HDL mg/dL 51   Cholesterol mg/dL 134   Triglycerides mg/dL 47   LDL Cholesterol mg/dL 73.6   Hdl/Cholesterol Ratio % 38.1   Non-HDL Cholesterol mg/dL 83   Total Cholesterol/HDL Ratio  2.6     TSH:  Recent Labs   Lab Result Units 08/12/20  0850   TSH uIU/mL 0.740     A1C:  Recent Labs   Lab Result Units 08/12/20  0850   Hemoglobin A1C  % 5.9*       Radiology:      Assessment/Plan     Radha Mike is a 55 y.o.female with:    An addendum has been made to the medical record to reflect the services provided.  The addendum is signed and bears the current date, time, and reason for the additional information being added to the medical record.        Weight loss Counseling, Morbid Obesity with BMI 50-59  Discussed various options phentermine, saxenda, and their corresponding side effects.   Also referred to [facility] weight bariatric clinic  Was not able to be seen by [facility] bariatrics or nutrition       No orders of the defined types were placed in this encounter.      Use of the [facility] Patient Portal discussed and encouraged during today's visit  -Continue current medications and maintain follow up with specialists.  Return to clinic in 6 months.  Future Appointments   Date Time Provider Department Center   9/21/2020  1:00 PM [facility] MAMMO1 [facility] M[facility]MO [facility]   9/22/2020  3:00 PM Tony Chavez MD [facility]   2/10/2021  2:30 PM Florence Abdi MD [facility] Vic Abdi MD  9/8/2020 2:08 PM    Primary Care Internal Medicine - [facility]

## 2020-09-08 PROBLEM — E66.01 MORBID OBESITY WITH BMI OF 50.0-59.9, ADULT: Status: ACTIVE | Noted: 2020-09-08

## 2020-09-08 PROBLEM — Z71.3 WEIGHT LOSS COUNSELING, ENCOUNTER FOR: Status: ACTIVE | Noted: 2020-09-08

## 2020-09-16 ENCOUNTER — TELEPHONE (OUTPATIENT)
Dept: FAMILY MEDICINE | Facility: CLINIC | Age: 55
End: 2020-09-16

## 2020-10-05 ENCOUNTER — PATIENT MESSAGE (OUTPATIENT)
Dept: INTERNAL MEDICINE | Facility: CLINIC | Age: 55
End: 2020-10-05

## 2020-10-19 NOTE — DISCHARGE SUMMARY
[facility]  Obstetrics & Gynecology  Discharge Summary    Patient Name: Radha Mike  MRN: [id number]  Admission Date: 6/12/2017  Hospital Length of Stay: 0 days  Discharge Date and Time:  06/12/2017 9:34 AM  Attending Physician: Mane Barnes MD   Discharging Provider: Mane Barnes MD  Primary Care Provider: Melanie Orlando MD        Hospital Course: Patient was scheduled for an Outpatient surgical procedure   Procedure:   Hysteroscopic Myomectomy /polypectomy                            Secondary to: AUB .+prolapsing fibroid     Procedure performed without complications see Op note for findings and details   Patient discharged home in good condition , voiding, ambulating and tolerating PO   Rx for pain control was given   General post op recommendations given .  Follow up at the clinic in 2 weeks for incision check .      Procedure(s) (LRB):    HYSTEROSCOPY (N/A)  REMOVAL-POLYP (N/A)  MYOMECTOMY-HYSTEROSCOPIC (N/A)     Consults:         Pending Diagnostic Studies:     None        Final Active Diagnoses:    Diagnosis Date Noted POA    Abnormal uterine bleeding (AUB) [N93.9] 06/12/2017 Yes      Problems Resolved During this Admission:    Diagnosis Date Noted Date Resolved POA        Discharged Condition: good    Disposition:     Follow Up:    Patient Instructions:   No discharge procedures on file.  Medications:  Reconciled Home Medications:   Current Discharge Medication List      CONTINUE these medications which have NOT CHANGED    Details   SYNTHROID 150 mcg tablet TAKE 1 TABLET BY MOUTH EVERY DAY  Qty: 90 tablet, Refills: 0    Associated Diagnoses: Hypothyroidism      ergocalciferol (ERGOCALCIFEROL) 50,000 unit Cap TAKE 1 CAPSULE BY MOUTH EVERY 7 DAYS  Qty: 8 capsule, Refills: 0    Associated Diagnoses: Vitamin D deficiency      ferrous gluconate (FERGON) 325 MG Tab Take 1 tablet (325 mg total) by mouth 3 (three) times daily with meals.  Qty: 90 tablet, Refills: 2     Associated Diagnoses: Iron deficiency anemia due to chronic blood loss             Mane Barnes MD  Obstetrics & Gynecology  [facility]   [Alert] : alert [No Acute Distress] : no acute distress [Playful] : playful [Normocephalic] : normocephalic [Conjunctivae with no discharge] : conjunctivae with no discharge [EOMI Bilateral] : EOMI bilateral [PERRL] : PERRL [Auricles Well Formed] : auricles well formed [Clear Tympanic membranes with present light reflex and bony landmarks] : clear tympanic membranes with present light reflex and bony landmarks [No Discharge] : no discharge [Nares Patent] : nares patent [Pink Nasal Mucosa] : pink nasal mucosa [Palate Intact] : palate intact [Uvula Midline] : uvula midline [Nonerythematous Oropharynx] : nonerythematous oropharynx [No Caries] : no caries [Trachea Midline] : trachea midline [No Palpable Masses] : no palpable masses [Supple, full passive range of motion] : supple, full passive range of motion [Symmetric Chest Rise] : symmetric chest rise [Clear to Auscultation Bilaterally] : clear to auscultation bilaterally [Regular Rate and Rhythm] : regular rate and rhythm [Normoactive Precordium] : normoactive precordium [Normal S1, S2 present] : normal S1, S2 present [No Murmurs] : no murmurs [+2 Femoral Pulses] : +2 femoral pulses [Soft] : soft [Non Distended] : non distended [NonTender] : non tender [No Hepatomegaly] : no hepatomegaly [Normoactive Bowel Sounds] : normoactive bowel sounds [Ramses 1] : Ramses 1 [No Splenomegaly] : no splenomegaly [No Clitoromegaly] : no clitoromegaly [Normal Vagina Introitus] : normal vagina introitus [Patent] : patent [Normally Placed] : normally placed [No Abnormal Lymph Nodes Palpated] : no abnormal lymph nodes palpated [Symmetric Buttocks Creases] : symmetric buttocks creases [Symmetric Hip Rotation] : symmetric hip rotation [No Gait Asymmetry] : no gait asymmetry [Normal Muscle Tone] : normal muscle tone [No pain or deformities with palpation of bone, muscles, joints] : no pain or deformities with palpation of bone, muscles, joints [No Spinal Dimple] : no spinal dimple [NoTuft of Hair] : no tuft of hair [Straight] : straight [+2 Patella DTR] : +2 patella DTR [Cranial Nerves Grossly Intact] : cranial nerves grossly intact [No Rash or Lesions] : no rash or lesions

## 2021-01-04 ENCOUNTER — PATIENT MESSAGE (OUTPATIENT)
Dept: ADMINISTRATIVE | Facility: HOSPITAL | Age: 56
End: 2021-01-04

## 2021-02-10 ENCOUNTER — OFFICE VISIT (OUTPATIENT)
Dept: FAMILY MEDICINE | Facility: CLINIC | Age: 56
End: 2021-02-10
Payer: COMMERCIAL

## 2021-02-10 VITALS
BODY MASS INDEX: 44.41 KG/M2 | OXYGEN SATURATION: 99 % | TEMPERATURE: 97 F | HEART RATE: 97 BPM | WEIGHT: 293 LBS | DIASTOLIC BLOOD PRESSURE: 82 MMHG | SYSTOLIC BLOOD PRESSURE: 128 MMHG | HEIGHT: 68 IN

## 2021-02-10 DIAGNOSIS — R07.89 CHEST PRESSURE: Primary | ICD-10-CM

## 2021-02-10 DIAGNOSIS — R73.02 IMPAIRED GLUCOSE TOLERANCE: ICD-10-CM

## 2021-02-10 DIAGNOSIS — E03.9 ACQUIRED HYPOTHYROIDISM: ICD-10-CM

## 2021-02-10 DIAGNOSIS — D64.9 ANEMIA, UNSPECIFIED TYPE: ICD-10-CM

## 2021-02-10 DIAGNOSIS — E66.01 MORBID OBESITY WITH BMI OF 50.0-59.9, ADULT: ICD-10-CM

## 2021-02-10 DIAGNOSIS — Z13.220 SCREENING, LIPID: ICD-10-CM

## 2021-02-10 DIAGNOSIS — R07.9 CHEST PAIN, UNSPECIFIED TYPE: ICD-10-CM

## 2021-02-10 DIAGNOSIS — D64.9 NORMOCYTIC ANEMIA: ICD-10-CM

## 2021-02-10 PROCEDURE — 99999 PR PBB SHADOW E&M-EST. PATIENT-LVL IV: ICD-10-PCS | Mod: PBBFAC,,, | Performed by: INTERNAL MEDICINE

## 2021-02-10 PROCEDURE — 99999 PR PBB SHADOW E&M-EST. PATIENT-LVL IV: CPT | Mod: PBBFAC,,, | Performed by: INTERNAL MEDICINE

## 2021-02-10 PROCEDURE — 99214 PR OFFICE/OUTPT VISIT, EST, LEVL IV, 30-39 MIN: ICD-10-PCS | Mod: S$GLB,,, | Performed by: INTERNAL MEDICINE

## 2021-02-10 PROCEDURE — 99214 OFFICE O/P EST MOD 30 MIN: CPT | Mod: S$GLB,,, | Performed by: INTERNAL MEDICINE

## 2021-02-10 RX ORDER — LEVOTHYROXINE SODIUM 175 UG/1
175 TABLET ORAL DAILY
Qty: 90 TABLET | Refills: 1 | Status: SHIPPED | OUTPATIENT
Start: 2021-02-10 | End: 2021-08-27

## 2021-02-17 PROBLEM — D64.9 ANEMIA, UNSPECIFIED: Status: ACTIVE | Noted: 2021-02-17

## 2021-02-19 ENCOUNTER — TELEPHONE (OUTPATIENT)
Dept: BARIATRICS | Facility: CLINIC | Age: 56
End: 2021-02-19

## 2021-02-19 ENCOUNTER — PATIENT MESSAGE (OUTPATIENT)
Dept: BARIATRICS | Facility: CLINIC | Age: 56
End: 2021-02-19

## 2021-02-22 ENCOUNTER — TELEPHONE (OUTPATIENT)
Dept: FAMILY MEDICINE | Facility: CLINIC | Age: 56
End: 2021-02-22

## 2021-03-01 ENCOUNTER — TELEPHONE (OUTPATIENT)
Dept: BARIATRICS | Facility: CLINIC | Age: 56
End: 2021-03-01

## 2021-03-12 ENCOUNTER — PATIENT OUTREACH (OUTPATIENT)
Dept: ADMINISTRATIVE | Facility: OTHER | Age: 56
End: 2021-03-12

## 2021-03-16 ENCOUNTER — OFFICE VISIT (OUTPATIENT)
Dept: BARIATRICS | Facility: CLINIC | Age: 56
End: 2021-03-16
Payer: COMMERCIAL

## 2021-03-16 VITALS
HEIGHT: 67 IN | HEART RATE: 76 BPM | SYSTOLIC BLOOD PRESSURE: 138 MMHG | DIASTOLIC BLOOD PRESSURE: 72 MMHG | WEIGHT: 293 LBS | OXYGEN SATURATION: 99 % | BODY MASS INDEX: 45.99 KG/M2

## 2021-03-16 DIAGNOSIS — R73.02 IMPAIRED GLUCOSE TOLERANCE: ICD-10-CM

## 2021-03-16 DIAGNOSIS — E66.01 CLASS 3 SEVERE OBESITY WITHOUT SERIOUS COMORBIDITY WITH BODY MASS INDEX (BMI) OF 50.0 TO 59.9 IN ADULT, UNSPECIFIED OBESITY TYPE: Primary | ICD-10-CM

## 2021-03-16 DIAGNOSIS — E03.9 ACQUIRED HYPOTHYROIDISM: ICD-10-CM

## 2021-03-16 DIAGNOSIS — E55.9 VITAMIN D DEFICIENCY: ICD-10-CM

## 2021-03-16 PROBLEM — E66.813 CLASS 3 SEVERE OBESITY WITHOUT SERIOUS COMORBIDITY WITH BODY MASS INDEX (BMI) OF 50.0 TO 59.9 IN ADULT: Status: ACTIVE | Noted: 2020-09-08

## 2021-03-16 PROCEDURE — 99999 PR PBB SHADOW E&M-EST. PATIENT-LVL III: CPT | Mod: PBBFAC,,, | Performed by: STUDENT IN AN ORGANIZED HEALTH CARE EDUCATION/TRAINING PROGRAM

## 2021-03-16 PROCEDURE — 99214 PR OFFICE/OUTPT VISIT, EST, LEVL IV, 30-39 MIN: ICD-10-PCS | Mod: S$GLB,,, | Performed by: STUDENT IN AN ORGANIZED HEALTH CARE EDUCATION/TRAINING PROGRAM

## 2021-03-16 PROCEDURE — 99214 OFFICE O/P EST MOD 30 MIN: CPT | Mod: S$GLB,,, | Performed by: STUDENT IN AN ORGANIZED HEALTH CARE EDUCATION/TRAINING PROGRAM

## 2021-03-16 PROCEDURE — 99999 PR PBB SHADOW E&M-EST. PATIENT-LVL III: ICD-10-PCS | Mod: PBBFAC,,, | Performed by: STUDENT IN AN ORGANIZED HEALTH CARE EDUCATION/TRAINING PROGRAM

## 2021-03-16 RX ORDER — SEMAGLUTIDE 1.34 MG/ML
INJECTION, SOLUTION SUBCUTANEOUS
Qty: 1.5 ML | Refills: 0 | Status: SHIPPED | OUTPATIENT
Start: 2021-03-16 | End: 2021-03-19 | Stop reason: SDUPTHER

## 2021-03-19 DIAGNOSIS — E66.01 CLASS 3 SEVERE OBESITY WITHOUT SERIOUS COMORBIDITY WITH BODY MASS INDEX (BMI) OF 50.0 TO 59.9 IN ADULT, UNSPECIFIED OBESITY TYPE: ICD-10-CM

## 2021-03-19 DIAGNOSIS — R73.02 IMPAIRED GLUCOSE TOLERANCE: ICD-10-CM

## 2021-03-23 ENCOUNTER — TELEPHONE (OUTPATIENT)
Dept: BARIATRICS | Facility: CLINIC | Age: 56
End: 2021-03-23

## 2021-03-23 RX ORDER — SEMAGLUTIDE 1.34 MG/ML
INJECTION, SOLUTION SUBCUTANEOUS
Qty: 1.5 ML | Refills: 0 | Status: SHIPPED | OUTPATIENT
Start: 2021-03-23 | End: 2021-05-04

## 2021-04-05 ENCOUNTER — TELEPHONE (OUTPATIENT)
Dept: BARIATRICS | Facility: CLINIC | Age: 56
End: 2021-04-05

## 2021-04-09 ENCOUNTER — TELEPHONE (OUTPATIENT)
Dept: BARIATRICS | Facility: CLINIC | Age: 56
End: 2021-04-09

## 2021-04-14 ENCOUNTER — PATIENT OUTREACH (OUTPATIENT)
Dept: ADMINISTRATIVE | Facility: OTHER | Age: 56
End: 2021-04-14

## 2021-04-14 NOTE — PLAN OF CARE
Spoke with family in waiting room   Complex Repair And Double M Plasty Text: The defect edges were debeveled with a #15 scalpel blade.  The primary defect was closed partially with a complex linear closure.  Given the location of the remaining defect, shape of the defect and the proximity to free margins a double M plasty was deemed most appropriate for complete closure of the defect.  Using a sterile surgical marker, an appropriate advancement flap was drawn incorporating the defect and placing the expected incisions within the relaxed skin tension lines where possible.    The area thus outlined was incised deep to adipose tissue with a #15 scalpel blade.  The skin margins were undermined to an appropriate distance in all directions utilizing iris scissors.

## 2021-05-13 ENCOUNTER — HOSPITAL ENCOUNTER (EMERGENCY)
Facility: HOSPITAL | Age: 56
Discharge: HOME | End: 2021-05-13
Attending: EMERGENCY MEDICINE
Payer: COMMERCIAL

## 2021-05-13 ENCOUNTER — OFFICE VISIT (OUTPATIENT)
Dept: FAMILY MEDICINE | Facility: CLINIC | Age: 56
End: 2021-05-13
Payer: COMMERCIAL

## 2021-05-13 VITALS
DIASTOLIC BLOOD PRESSURE: 76 MMHG | HEIGHT: 67 IN | HEART RATE: 73 BPM | WEIGHT: 293 LBS | OXYGEN SATURATION: 99 % | TEMPERATURE: 98 F | SYSTOLIC BLOOD PRESSURE: 132 MMHG | BODY MASS INDEX: 45.99 KG/M2

## 2021-05-13 VITALS
TEMPERATURE: 98 F | HEART RATE: 74 BPM | HEIGHT: 67 IN | DIASTOLIC BLOOD PRESSURE: 102 MMHG | WEIGHT: 293 LBS | RESPIRATION RATE: 18 BRPM | SYSTOLIC BLOOD PRESSURE: 130 MMHG | OXYGEN SATURATION: 100 % | BODY MASS INDEX: 45.99 KG/M2

## 2021-05-13 DIAGNOSIS — S01.511A LIP LACERATION, INITIAL ENCOUNTER: Primary | ICD-10-CM

## 2021-05-13 DIAGNOSIS — K12.2 ORAL ABSCESS: Primary | ICD-10-CM

## 2021-05-13 DIAGNOSIS — K13.0 CELLULITIS OF LIP: ICD-10-CM

## 2021-05-13 PROCEDURE — 99214 PR OFFICE/OUTPT VISIT, EST, LEVL IV, 30-39 MIN: ICD-10-PCS | Mod: S$GLB,,, | Performed by: INTERNAL MEDICINE

## 2021-05-13 PROCEDURE — 99999 PR PBB SHADOW E&M-EST. PATIENT-LVL III: CPT | Mod: PBBFAC,,, | Performed by: INTERNAL MEDICINE

## 2021-05-13 PROCEDURE — 99284 EMERGENCY DEPT VISIT MOD MDM: CPT

## 2021-05-13 PROCEDURE — 99284 PR EMERGENCY DEPT VISIT,LEVEL IV: ICD-10-PCS | Mod: ,,, | Performed by: EMERGENCY MEDICINE

## 2021-05-13 PROCEDURE — 99284 EMERGENCY DEPT VISIT MOD MDM: CPT | Mod: ,,, | Performed by: EMERGENCY MEDICINE

## 2021-05-13 PROCEDURE — 99214 OFFICE O/P EST MOD 30 MIN: CPT | Mod: S$GLB,,, | Performed by: INTERNAL MEDICINE

## 2021-05-13 PROCEDURE — 99999 PR PBB SHADOW E&M-EST. PATIENT-LVL III: ICD-10-PCS | Mod: PBBFAC,,, | Performed by: INTERNAL MEDICINE

## 2021-05-13 RX ORDER — HYDROCODONE BITARTRATE AND ACETAMINOPHEN 5; 325 MG/1; MG/1
1 TABLET ORAL EVERY 4 HOURS PRN
Qty: 3 TABLET | Refills: 0 | Status: SHIPPED | OUTPATIENT
Start: 2021-05-13 | End: 2022-03-28

## 2021-05-13 RX ORDER — PHENYLPROPANOLAMINE/CLEMASTINE 75-1.34MG
TABLET, EXTENDED RELEASE ORAL
COMMUNITY

## 2021-05-13 RX ORDER — AMOXICILLIN AND CLAVULANATE POTASSIUM 875; 125 MG/1; MG/1
1 TABLET, FILM COATED ORAL 2 TIMES DAILY
Qty: 14 TABLET | Refills: 0 | Status: SHIPPED | OUTPATIENT
Start: 2021-05-13 | End: 2022-03-28

## 2021-05-14 ENCOUNTER — PES CALL (OUTPATIENT)
Dept: ADMINISTRATIVE | Facility: CLINIC | Age: 56
End: 2021-05-14

## 2021-05-24 ENCOUNTER — TELEPHONE (OUTPATIENT)
Dept: BARIATRICS | Facility: CLINIC | Age: 56
End: 2021-05-24

## 2021-05-28 ENCOUNTER — PATIENT MESSAGE (OUTPATIENT)
Dept: BARIATRICS | Facility: CLINIC | Age: 56
End: 2021-05-28

## 2021-07-07 ENCOUNTER — PATIENT MESSAGE (OUTPATIENT)
Dept: ADMINISTRATIVE | Facility: HOSPITAL | Age: 56
End: 2021-07-07

## 2021-09-22 DIAGNOSIS — Z12.31 OTHER SCREENING MAMMOGRAM: ICD-10-CM

## 2021-10-05 ENCOUNTER — PATIENT MESSAGE (OUTPATIENT)
Dept: ADMINISTRATIVE | Facility: HOSPITAL | Age: 56
End: 2021-10-05

## 2021-12-16 ENCOUNTER — PATIENT MESSAGE (OUTPATIENT)
Dept: ADMINISTRATIVE | Facility: HOSPITAL | Age: 56
End: 2021-12-16
Payer: COMMERCIAL

## 2022-01-10 ENCOUNTER — PATIENT MESSAGE (OUTPATIENT)
Dept: ADMINISTRATIVE | Facility: HOSPITAL | Age: 57
End: 2022-01-10
Payer: COMMERCIAL

## 2022-03-28 ENCOUNTER — OFFICE VISIT (OUTPATIENT)
Dept: FAMILY MEDICINE | Facility: CLINIC | Age: 57
End: 2022-03-28
Payer: COMMERCIAL

## 2022-03-28 VITALS
HEART RATE: 105 BPM | OXYGEN SATURATION: 98 % | DIASTOLIC BLOOD PRESSURE: 84 MMHG | BODY MASS INDEX: 45.99 KG/M2 | SYSTOLIC BLOOD PRESSURE: 124 MMHG | WEIGHT: 293 LBS | TEMPERATURE: 98 F | HEIGHT: 67 IN

## 2022-03-28 DIAGNOSIS — Z12.31 SCREENING MAMMOGRAM FOR HIGH-RISK PATIENT: Primary | ICD-10-CM

## 2022-03-28 DIAGNOSIS — H61.21 IMPACTED CERUMEN OF RIGHT EAR: ICD-10-CM

## 2022-03-28 DIAGNOSIS — E03.9 ACQUIRED HYPOTHYROIDISM: ICD-10-CM

## 2022-03-28 PROCEDURE — 69209 REMOVE IMPACTED EAR WAX UNI: CPT | Mod: RT,S$GLB,, | Performed by: STUDENT IN AN ORGANIZED HEALTH CARE EDUCATION/TRAINING PROGRAM

## 2022-03-28 PROCEDURE — 99214 PR OFFICE/OUTPT VISIT, EST, LEVL IV, 30-39 MIN: ICD-10-PCS | Mod: 25,S$GLB,, | Performed by: STUDENT IN AN ORGANIZED HEALTH CARE EDUCATION/TRAINING PROGRAM

## 2022-03-28 PROCEDURE — 69209 EAR CERUMEN REMOVAL: ICD-10-PCS | Mod: RT,S$GLB,, | Performed by: STUDENT IN AN ORGANIZED HEALTH CARE EDUCATION/TRAINING PROGRAM

## 2022-03-28 PROCEDURE — 99214 OFFICE O/P EST MOD 30 MIN: CPT | Mod: 25,S$GLB,, | Performed by: STUDENT IN AN ORGANIZED HEALTH CARE EDUCATION/TRAINING PROGRAM

## 2022-03-28 RX ORDER — LEVOTHYROXINE SODIUM 175 UG/1
TABLET ORAL
Qty: 90 TABLET | Refills: 1 | Status: SHIPPED | OUTPATIENT
Start: 2022-03-28 | End: 2022-09-28 | Stop reason: SDUPTHER

## 2022-03-28 RX ORDER — LEVOTHYROXINE SODIUM 175 UG/1
175 TABLET ORAL
Qty: 30 TABLET | Refills: 0 | Status: SHIPPED | OUTPATIENT
Start: 2022-03-28 | End: 2023-05-01

## 2022-03-29 NOTE — PROCEDURES
"Ear Cerumen Removal    Date/Time: 3/28/2022 3:00 PM  Performed by: Jose Brito MD  Authorized by: Jose Brito MD     Time out: Immediately prior to procedure a "time out" was called to verify the correct patient, procedure, equipment, support staff and site/side marked as required.    Consent Done?:  Yes (Verbal)    Local anesthetic:  None  Ceruminolytics applied: Ceruminolytics applied prior to the procedure    Ceruminolytic: liquid docusate.  Location details:  Right ear  Procedure type: irrigation    Cerumen  Removal Results:  Cerumen completely removed  Patient tolerance:  Patient tolerated the procedure well with no immediate complications      "

## 2022-03-29 NOTE — PROGRESS NOTES
Patient ID: Radha Mike is a 56 y.o. female. This patient is new to me.    Chief Complaint: Medication Refill and Ear Fullness    Ear Fullness   There is pain in the right ear. This is a recurrent problem. The current episode started 1 to 4 weeks ago. The problem occurs constantly. The problem has been unchanged. There has been no fever. The patient is experiencing no pain. Pertinent negatives include no abdominal pain, coughing, diarrhea, ear discharge, headaches, neck pain, rash, rhinorrhea, sore throat or vomiting. She has tried nothing for the symptoms. There is no history of a chronic ear infection.       Past Medical History:   Diagnosis Date    Anemia     Arthritis     Psoriasis     Thyroid disease        Past Surgical History:   Procedure Laterality Date    ANKLE DEBRIDEMENT  2005    CHOLECYSTECTOMY  1988    TUBAL LIGATION  1996       Family History   Problem Relation Age of Onset    Diabetes Mother     Breast cancer Mother     Heart attack Mother     Heart disease Mother         CHF    Diabetes Father     Cancer Father         mouth    Stomach cancer Father     Arthritis Sister     No Known Problems Brother     No Known Problems Daughter     Hypothyroidism Son     Diabetes Sister        Social History     Tobacco Use    Smoking status: Never Smoker    Smokeless tobacco: Never Used   Substance Use Topics    Alcohol use: Yes     Comment: occasional    Drug use: No       Review of patient's allergies indicates:   Allergen Reactions    Codeine Hives and Other (See Comments)     Other reaction(s): Hives  Stomach feels raw        Review of Systems  Review of Systems   Constitutional: Negative for fever.   HENT: Negative for ear discharge, ear pain, rhinorrhea, sinus pain and sore throat.    Eyes: Negative for discharge.   Respiratory: Negative for cough and shortness of breath.    Cardiovascular: Negative for chest pain and leg swelling.   Gastrointestinal: Negative for abdominal  "pain, diarrhea, nausea and vomiting.   Genitourinary: Negative for urgency.   Musculoskeletal: Negative for myalgias and neck pain.   Skin: Negative for rash.   Neurological: Negative for weakness and headaches.   Psychiatric/Behavioral: Negative for depression.   All other systems reviewed and are negative.      Currently Medications  Current Outpatient Medications on File Prior to Visit   Medication Sig Dispense Refill    ibuprofen 200 mg Cap Take by mouth.      [DISCONTINUED] SYNTHROID 175 mcg tablet TAKE 1 TABLET(175 MCG) BY MOUTH EVERY DAY 90 tablet 1    [DISCONTINUED] amoxicillin-clavulanate 875-125mg (AUGMENTIN) 875-125 mg per tablet Take 1 tablet by mouth 2 (two) times daily. (Patient not taking: Reported on 3/28/2022) 14 tablet 0    [DISCONTINUED] ergocalciferol (ERGOCALCIFEROL) 50,000 unit Cap Take 1 capsule (50,000 Units total) by mouth every 7 days. (Patient not taking: Reported on 3/28/2022) 8 capsule 0    [DISCONTINUED] HYDROcodone-acetaminophen (NORCO) 5-325 mg per tablet Take 1 tablet by mouth every 4 (four) hours as needed for Pain. (Patient not taking: Reported on 3/28/2022) 3 tablet 0     No current facility-administered medications on file prior to visit.       Physical  Exam  Vitals:    03/28/22 1513   BP: 124/84   BP Location: Right arm   Patient Position: Sitting   Pulse: 105   Temp: 98 °F (36.7 °C)   SpO2: 98%   Weight: (!) 166 kg (366 lb 1.2 oz)   Height: 5' 7" (1.702 m)      Body mass index is 57.34 kg/m².    Physical Exam  Vitals and nursing note reviewed.   Constitutional:       General: She is not in acute distress.     Appearance: She is not ill-appearing.   HENT:      Head: Normocephalic and atraumatic.      Right Ear: External ear normal. There is impacted cerumen.      Left Ear: External ear normal. There is no impacted cerumen.      Nose: Nose normal.      Mouth/Throat:      Mouth: Mucous membranes are moist.   Eyes:      Extraocular Movements: Extraocular movements intact.     "  Conjunctiva/sclera: Conjunctivae normal.   Cardiovascular:      Rate and Rhythm: Normal rate and regular rhythm.      Pulses: Normal pulses.      Heart sounds: No murmur heard.  Pulmonary:      Effort: Pulmonary effort is normal. No respiratory distress.      Breath sounds: No wheezing.   Abdominal:      General: There is no distension.      Palpations: Abdomen is soft. There is no mass.      Tenderness: There is no abdominal tenderness.   Musculoskeletal:         General: No swelling.      Cervical back: Normal range of motion.   Skin:     Coloration: Skin is not jaundiced.      Findings: No rash.   Neurological:      General: No focal deficit present.      Mental Status: She is alert and oriented to person, place, and time.   Psychiatric:         Mood and Affect: Mood normal.         Thought Content: Thought content normal.         Labs:    Complete Blood Count  Lab Results   Component Value Date    RBC 5.04 08/12/2020    HGB 11.6 (L) 08/12/2020    HCT 37.9 08/12/2020    MCV 75 (L) 08/12/2020    MCH 23.0 (L) 08/12/2020    MCHC 30.6 (L) 08/12/2020    RDW 15.5 (H) 08/12/2020     08/12/2020    MPV 11.6 08/12/2020    GRAN 4.8 08/12/2020    GRAN 59.9 08/12/2020    LYMPH 2.4 08/12/2020    LYMPH 29.4 08/12/2020    MONO 0.5 08/12/2020    MONO 6.3 08/12/2020    EOS 0.3 08/12/2020    BASO 0.04 08/12/2020    EOSINOPHIL 3.5 08/12/2020    BASOPHIL 0.5 08/12/2020    DIFFMETHOD Automated 08/12/2020       Comprehensive Metabolic Panel  No results found for: GLU, BUN, CREATININE, NA, K, CL, PROT, ALBUMIN, BILITOT, AST, ALKPHOS, CO2, ALT, ANIONGAP, EGFRNONAA, ESTGFRAFRICA    TSH  No results found for: TSH    Imaging:  Mammo Digital Screening Bilat w/ Brody  Narrative: Result:   Mammo Digital Screening Bilat w/ Brody     History:  Patient is 54 y.o. and is seen for a screening mammogram.    Films Compared:  Prior images (if available) were compared.     Findings:  This procedure was performed using tomosynthesis.   Computer-aided   detection was utilized in the interpretation of this examination.  The breasts are almost entirely fatty.     Bilateral  There are round calcifications seen in both breasts.   Impression: Bilateral  There is no mammographic evidence of malignancy.    BI-RADS Category:   Overall: 2 - Benign     Recommendation:  Routine screening mammogram in 1 year is recommended.    The patient's estimated lifetime risk of breast cancer (to age 85) based   on Tyrer-Cuzick - 7 risk assessment model is: Tyrer-Cuzick: 21.77 %.   According to the American Cancer Society,  patients with a lifetime breast   cancer risk of 20% or higher might benefit from supplemental screening   tests.      Assessment/Plan:    Problem List Items Addressed This Visit        Endocrine    Hypothyroidism    Overview     Has gland            Relevant Medications    SYNTHROID 175 mcg tablet      Other Visit Diagnoses     Screening mammogram for high-risk patient    -  Primary    Relevant Orders    Mammo Digital Screening Bilat w/ Brody    Impacted cerumen of right ear        Relevant Orders    Ear Cerumen Removal (Completed)            Discussed how to stay healthy including: diet, exercise, refraining from smoking and discussed screening exams / tests needed for age, sex and family Hx.    RTC 6 mo    Jose Brito MD

## 2022-04-11 ENCOUNTER — TELEPHONE (OUTPATIENT)
Dept: FAMILY MEDICINE | Facility: CLINIC | Age: 57
End: 2022-04-11
Payer: COMMERCIAL

## 2022-05-31 ENCOUNTER — PATIENT MESSAGE (OUTPATIENT)
Dept: ADMINISTRATIVE | Facility: HOSPITAL | Age: 57
End: 2022-05-31
Payer: COMMERCIAL

## 2022-08-03 ENCOUNTER — PATIENT MESSAGE (OUTPATIENT)
Dept: BARIATRICS | Facility: CLINIC | Age: 57
End: 2022-08-03
Payer: COMMERCIAL

## 2022-08-24 ENCOUNTER — PATIENT MESSAGE (OUTPATIENT)
Dept: ADMINISTRATIVE | Facility: HOSPITAL | Age: 57
End: 2022-08-24
Payer: COMMERCIAL

## 2022-09-14 ENCOUNTER — PATIENT OUTREACH (OUTPATIENT)
Dept: ADMINISTRATIVE | Facility: HOSPITAL | Age: 57
End: 2022-09-14
Payer: COMMERCIAL

## 2022-09-14 ENCOUNTER — PATIENT MESSAGE (OUTPATIENT)
Dept: ADMINISTRATIVE | Facility: HOSPITAL | Age: 57
End: 2022-09-14
Payer: COMMERCIAL

## 2022-09-14 NOTE — PROGRESS NOTES
Care Everywhere updates requested and reviewed.  Immunizations reconciled. Media reports reviewed.  Duplicate HM overrides and  orders removed.  Overdue HM topic chart audit and/or requested.  Overdue lab testing linked to upcoming lab appointments if applies.    Lab [email or web address], and [facility] reviewed   Pap Smear     DIS reviewed      Mammogram   Health Maintenance Due   Topic Date Due    HIV Screening  Never done    Shingles Vaccine (1 of 2) Never done    Mammogram  2020    Cervical Cancer Screening  2022    Influenza Vaccine (1) Never done

## 2022-09-14 NOTE — LETTER
September 21, 2022    Radha Mike  [address]  [place] [address]             [facility]  [address]  [place] [address]  Phone: [phone] Dear Steph[facility] is committed to your overall health.  To help you get the most out of each of your visits, we will review your information to make sure you are up to date on all of your recommended tests and/or procedures.       Jose Brito MD  has found that your chart shows you may be due for:     Pap smear  Mammogram        If you have had any of the above done at another facility, please bring the records or information with you so that your record at [facility] will be complete.  If you would like to schedule any of these, please contact me.     If you are currently taking medication, please bring it with you to your appointment for review.           Thank you for letting us care for you,        Alina Diehl, Care Coordinator  [facility]  Phone:  [phone]  Fax: [phone]

## 2022-09-28 ENCOUNTER — OFFICE VISIT (OUTPATIENT)
Dept: FAMILY MEDICINE | Facility: CLINIC | Age: 57
End: 2022-09-28
Payer: COMMERCIAL

## 2022-09-28 VITALS
TEMPERATURE: 97 F | BODY MASS INDEX: 45.99 KG/M2 | HEIGHT: 67 IN | HEART RATE: 106 BPM | DIASTOLIC BLOOD PRESSURE: 86 MMHG | WEIGHT: 293 LBS | SYSTOLIC BLOOD PRESSURE: 136 MMHG | OXYGEN SATURATION: 98 %

## 2022-09-28 DIAGNOSIS — R73.9 HYPERGLYCEMIA: ICD-10-CM

## 2022-09-28 DIAGNOSIS — Z01.419 WELL WOMAN EXAM: ICD-10-CM

## 2022-09-28 DIAGNOSIS — Z00.00 WELLNESS EXAMINATION: Primary | ICD-10-CM

## 2022-09-28 DIAGNOSIS — E03.9 ACQUIRED HYPOTHYROIDISM: ICD-10-CM

## 2022-09-28 DIAGNOSIS — Z12.31 SCREENING MAMMOGRAM FOR HIGH-RISK PATIENT: ICD-10-CM

## 2022-09-28 DIAGNOSIS — Z13.6 SCREENING FOR CARDIOVASCULAR CONDITION: ICD-10-CM

## 2022-09-28 PROCEDURE — 99396 PREV VISIT EST AGE 40-64: CPT | Mod: S$GLB,,, | Performed by: STUDENT IN AN ORGANIZED HEALTH CARE EDUCATION/TRAINING PROGRAM

## 2022-09-28 PROCEDURE — 99396 PR PREVENTIVE VISIT,EST,40-64: ICD-10-PCS | Mod: S$GLB,,, | Performed by: STUDENT IN AN ORGANIZED HEALTH CARE EDUCATION/TRAINING PROGRAM

## 2022-09-28 RX ORDER — LEVOTHYROXINE SODIUM 175 UG/1
TABLET ORAL
Qty: 90 TABLET | Refills: 1 | Status: SHIPPED | OUTPATIENT
Start: 2022-09-28 | End: 2023-03-28

## 2022-10-03 ENCOUNTER — PATIENT MESSAGE (OUTPATIENT)
Dept: FAMILY MEDICINE | Facility: CLINIC | Age: 57
End: 2022-10-03
Payer: COMMERCIAL

## 2022-10-03 ENCOUNTER — LAB VISIT (OUTPATIENT)
Dept: LAB | Facility: HOSPITAL | Age: 57
End: 2022-10-03
Attending: STUDENT IN AN ORGANIZED HEALTH CARE EDUCATION/TRAINING PROGRAM
Payer: COMMERCIAL

## 2022-10-03 DIAGNOSIS — R73.9 HYPERGLYCEMIA: ICD-10-CM

## 2022-10-03 DIAGNOSIS — E03.9 ACQUIRED HYPOTHYROIDISM: ICD-10-CM

## 2022-10-03 DIAGNOSIS — Z13.6 SCREENING FOR CARDIOVASCULAR CONDITION: ICD-10-CM

## 2022-10-03 LAB
ALBUMIN SERPL BCP-MCNC: 4.4 G/DL (ref 3.5–5.2)
ALP SERPL-CCNC: 85 U/L (ref 38–126)
ALT SERPL W/O P-5'-P-CCNC: 19 U/L (ref 10–44)
ANION GAP SERPL CALC-SCNC: 9 MMOL/L (ref 8–16)
AST SERPL-CCNC: 27 U/L (ref 15–46)
BASOPHILS # BLD AUTO: 0.03 K/UL (ref 0–0.2)
BASOPHILS NFR BLD: 0.3 % (ref 0–1.9)
BILIRUB SERPL-MCNC: 0.5 MG/DL (ref 0.1–1)
CALCIUM SERPL-MCNC: 9.4 MG/DL (ref 8.7–10.5)
CHLORIDE SERPL-SCNC: 102 MMOL/L (ref 95–110)
CHOLEST SERPL-MCNC: 119 MG/DL (ref 120–199)
CHOLEST/HDLC SERPL: 2.7 {RATIO} (ref 2–5)
CO2 SERPL-SCNC: 30 MMOL/L (ref 23–29)
CREAT SERPL-MCNC: 0.79 MG/DL (ref 0.5–1.4)
DIFFERENTIAL METHOD: ABNORMAL
EOSINOPHIL # BLD AUTO: 0.3 K/UL (ref 0–0.5)
EOSINOPHIL NFR BLD: 3 % (ref 0–8)
ERYTHROCYTE [DISTWIDTH] IN BLOOD BY AUTOMATED COUNT: 15.7 % (ref 11.5–14.5)
EST. GFR  (NO RACE VARIABLE): >60 ML/MIN/1.73 M^2
ESTIMATED AVG GLUCOSE: 123 MG/DL (ref 68–131)
GLUCOSE SERPL-MCNC: 112 MG/DL (ref 70–110)
HBA1C MFR BLD: 5.9 % (ref 4–5.6)
HCT VFR BLD AUTO: 40.8 % (ref 37–48.5)
HDLC SERPL-MCNC: 44 MG/DL (ref 40–75)
HDLC SERPL: 37 % (ref 20–50)
HGB BLD-MCNC: 12.4 G/DL (ref 12–16)
IMM GRANULOCYTES # BLD AUTO: 0.03 K/UL (ref 0–0.04)
IMM GRANULOCYTES NFR BLD AUTO: 0.3 % (ref 0–0.5)
LDLC SERPL CALC-MCNC: 63.8 MG/DL (ref 63–159)
LYMPHOCYTES # BLD AUTO: 2.1 K/UL (ref 1–4.8)
LYMPHOCYTES NFR BLD: 24 % (ref 18–48)
MCH RBC QN AUTO: 22.6 PG (ref 27–31)
MCHC RBC AUTO-ENTMCNC: 30.4 G/DL (ref 32–36)
MCV RBC AUTO: 75 FL (ref 82–98)
MONOCYTES # BLD AUTO: 0.5 K/UL (ref 0.3–1)
MONOCYTES NFR BLD: 6.2 % (ref 4–15)
NEUTROPHILS # BLD AUTO: 5.7 K/UL (ref 1.8–7.7)
NEUTROPHILS NFR BLD: 66.2 % (ref 38–73)
NONHDLC SERPL-MCNC: 75 MG/DL
NRBC BLD-RTO: 0 /100 WBC
PLATELET # BLD AUTO: 395 K/UL (ref 150–450)
PMV BLD AUTO: 10.6 FL (ref 9.2–12.9)
POTASSIUM SERPL-SCNC: 4.3 MMOL/L (ref 3.5–5.1)
PROT SERPL-MCNC: 7.9 G/DL (ref 6–8.4)
RBC # BLD AUTO: 5.48 M/UL (ref 4–5.4)
SODIUM SERPL-SCNC: 141 MMOL/L (ref 136–145)
T4 FREE SERPL-MCNC: 1.46 NG/DL (ref 0.71–1.51)
TRIGL SERPL-MCNC: 56 MG/DL (ref 30–150)
TSH SERPL DL<=0.005 MIU/L-ACNC: <0.026 UIU/ML (ref 0.4–4)
UUN UR-MCNC: 5 MG/DL (ref 7–17)
WBC # BLD AUTO: 8.65 K/UL (ref 3.9–12.7)

## 2022-10-03 PROCEDURE — 84439 ASSAY OF FREE THYROXINE: CPT | Performed by: STUDENT IN AN ORGANIZED HEALTH CARE EDUCATION/TRAINING PROGRAM

## 2022-10-03 PROCEDURE — 80061 LIPID PANEL: CPT | Performed by: STUDENT IN AN ORGANIZED HEALTH CARE EDUCATION/TRAINING PROGRAM

## 2022-10-03 PROCEDURE — 83036 HEMOGLOBIN GLYCOSYLATED A1C: CPT | Performed by: STUDENT IN AN ORGANIZED HEALTH CARE EDUCATION/TRAINING PROGRAM

## 2022-10-03 PROCEDURE — 80053 COMPREHEN METABOLIC PANEL: CPT | Mod: PO | Performed by: STUDENT IN AN ORGANIZED HEALTH CARE EDUCATION/TRAINING PROGRAM

## 2022-10-03 PROCEDURE — 36415 COLL VENOUS BLD VENIPUNCTURE: CPT | Mod: PO | Performed by: STUDENT IN AN ORGANIZED HEALTH CARE EDUCATION/TRAINING PROGRAM

## 2022-10-03 PROCEDURE — 85025 COMPLETE CBC W/AUTO DIFF WBC: CPT | Mod: PO | Performed by: STUDENT IN AN ORGANIZED HEALTH CARE EDUCATION/TRAINING PROGRAM

## 2022-10-03 PROCEDURE — 84443 ASSAY THYROID STIM HORMONE: CPT | Mod: PO | Performed by: STUDENT IN AN ORGANIZED HEALTH CARE EDUCATION/TRAINING PROGRAM

## 2022-10-03 NOTE — PROGRESS NOTES
" Patient ID: Radha Mike is a 57 y.o. female.     Chief Complaint: Follow-up    Follow-up  Pertinent negatives include no chest pain, coughing, fever, headaches, myalgias, nausea, rash, vomiting or weakness.    Patient here for wellness exam. She has no complaints today. She denies recent chest pain and shortness of breath.     Review of Systems  Review of Systems   Constitutional:  Negative for fever.   HENT:  Negative for ear pain and sinus pain.    Eyes:  Negative for discharge.   Respiratory:  Negative for cough and shortness of breath.    Cardiovascular:  Negative for chest pain and leg swelling.   Gastrointestinal:  Negative for diarrhea, nausea and vomiting.   Genitourinary:  Negative for urgency.   Musculoskeletal:  Negative for myalgias.   Skin:  Negative for rash.   Neurological:  Negative for weakness and headaches.   Psychiatric/Behavioral:  Negative for depression.    All other systems reviewed and are negative.    Currently Medications  Current Outpatient Medications on File Prior to Visit   Medication Sig Dispense Refill    ibuprofen 200 mg Cap Take by mouth.      levothyroxine (SYNTHROID, LEVOTHROID) 175 MCG tablet Take 1 tablet (175 mcg total) by mouth before breakfast. (Patient not taking: Reported on 9/28/2022) 30 tablet 0     No current facility-administered medications on file prior to visit.       Physical  Exam  Vitals:    09/28/22 1409   BP: 136/86   BP Location: Right arm   Patient Position: Sitting   Pulse: 106   Temp: 97.4 °F (36.3 °C)   SpO2: 98%   Weight: (!) 167.2 kg (368 lb 9.8 oz)   Height: 5' 7" (1.702 m)      Body mass index is 57.73 kg/m².    Physical Exam  Vitals and nursing note reviewed.   Constitutional:       General: She is not in acute distress.     Appearance: She is not ill-appearing.   HENT:      Head: Normocephalic and atraumatic.      Right Ear: External ear normal.      Left Ear: External ear normal.      Nose: Nose normal.      Mouth/Throat:      Mouth: Mucous " membranes are moist.   Eyes:      Extraocular Movements: Extraocular movements intact.      Conjunctiva/sclera: Conjunctivae normal.   Cardiovascular:      Rate and Rhythm: Normal rate and regular rhythm.      Pulses: Normal pulses.      Heart sounds: No murmur heard.  Pulmonary:      Effort: Pulmonary effort is normal. No respiratory distress.      Breath sounds: No wheezing.   Abdominal:      General: There is no distension.      Palpations: Abdomen is soft. There is no mass.      Tenderness: There is no abdominal tenderness.   Musculoskeletal:         General: No swelling.      Cervical back: Normal range of motion.   Skin:     Coloration: Skin is not jaundiced.      Findings: No rash.   Neurological:      General: No focal deficit present.      Mental Status: She is alert and oriented to person, place, and time.   Psychiatric:         Mood and Affect: Mood normal.         Thought Content: Thought content normal.       Labs:    Complete Blood Count  Lab Results   Component Value Date    RBC 5.04 08/12/2020    HGB 11.6 (L) 08/12/2020    HCT 37.9 08/12/2020    MCV 75 (L) 08/12/2020    MCH 23.0 (L) 08/12/2020    MCHC 30.6 (L) 08/12/2020    RDW 15.5 (H) 08/12/2020     08/12/2020    MPV 11.6 08/12/2020    GRAN 4.8 08/12/2020    GRAN 59.9 08/12/2020    LYMPH 2.4 08/12/2020    LYMPH 29.4 08/12/2020    MONO 0.5 08/12/2020    MONO 6.3 08/12/2020    EOS 0.3 08/12/2020    BASO 0.04 08/12/2020    EOSINOPHIL 3.5 08/12/2020    BASOPHIL 0.5 08/12/2020    DIFFMETHOD Automated 08/12/2020       Comprehensive Metabolic Panel  No results found for: GLU, BUN, CREATININE, NA, K, CL, PROT, ALBUMIN, BILITOT, AST, ALKPHOS, CO2, ALT, ANIONGAP, EGFRNONAA, ESTGFRAFRICA    TSH  No results found for: TSH    Imaging:  Mammo Digital Screening Bilat w/ Brody  Narrative: Result:   Mammo Digital Screening Bilat w/ Brody     History:  Patient is 54 y.o. and is seen for a screening mammogram.    Films Compared:  Prior images (if available) were  compared.     Findings:  This procedure was performed using tomosynthesis.  Computer-aided   detection was utilized in the interpretation of this examination.  The breasts are almost entirely fatty.     Bilateral  There are round calcifications seen in both breasts.   Impression: Bilateral  There is no mammographic evidence of malignancy.    BI-RADS Category:   Overall: 2 - Benign     Recommendation:  Routine screening mammogram in 1 year is recommended.    The patient's estimated lifetime risk of breast cancer (to age 85) based   on Tyrer-Cuzick - 7 risk assessment model is: Tyrer-Cuzick: 21.77 %.   According to the [employer] Society,  patients with a lifetime breast   cancer risk of 20% or higher might benefit from supplemental screening   tests.      Assessment/Plan:    Problem List Items Addressed This Visit          Endocrine    Hypothyroidism    Overview     Has gland          Relevant Medications    SYNTHROID 175 mcg tablet    Other Relevant Orders    TSH    BMI 50.0-59.9, adult    Overview     Knows she needs to lose weight   Not interested in bariatric surgery   Has struggled with weight her whole life          Relevant Orders    CBC Auto Differential    Comprehensive metabolic panel     Other Visit Diagnoses       Wellness examination    -  Primary    Hyperglycemia        Relevant Orders    HEMOGLOBIN A1C    Screening for cardiovascular condition        Relevant Orders    LIPID PANEL    Well woman exam        Relevant Orders    Ambulatory referral/consult to Gynecology    Screening mammogram for high-risk patient        Relevant Orders    Mammo Digital Screening Bilat w/ Brody             Discussed how to stay healthy including: diet, exercise, refraining from smoking and discussed screening exams / tests needed for age, sex and family Hx.        Jose Brito MD

## 2022-10-06 ENCOUNTER — PATIENT MESSAGE (OUTPATIENT)
Dept: BARIATRICS | Facility: CLINIC | Age: 57
End: 2022-10-06
Payer: COMMERCIAL

## 2022-10-10 ENCOUNTER — PATIENT MESSAGE (OUTPATIENT)
Dept: ADMINISTRATIVE | Facility: HOSPITAL | Age: 57
End: 2022-10-10
Payer: COMMERCIAL

## 2022-10-20 ENCOUNTER — HOSPITAL ENCOUNTER (OUTPATIENT)
Dept: RADIOLOGY | Facility: HOSPITAL | Age: 57
Discharge: HOME | End: 2022-10-20
Attending: STUDENT IN AN ORGANIZED HEALTH CARE EDUCATION/TRAINING PROGRAM
Payer: COMMERCIAL

## 2022-10-20 DIAGNOSIS — Z12.31 SCREENING MAMMOGRAM FOR HIGH-RISK PATIENT: ICD-10-CM

## 2022-10-20 PROCEDURE — 77063 BREAST TOMOSYNTHESIS BI: CPT | Mod: TC,PO

## 2022-10-20 PROCEDURE — 77067 SCR MAMMO BI INCL CAD: CPT | Mod: TC,PO

## 2022-11-07 ENCOUNTER — OFFICE VISIT (OUTPATIENT)
Dept: OBSTETRICS AND GYNECOLOGY | Facility: CLINIC | Age: 57
End: 2022-11-07
Payer: COMMERCIAL

## 2022-11-07 VITALS
HEIGHT: 67 IN | SYSTOLIC BLOOD PRESSURE: 133 MMHG | WEIGHT: 293 LBS | BODY MASS INDEX: 45.99 KG/M2 | DIASTOLIC BLOOD PRESSURE: 75 MMHG

## 2022-11-07 DIAGNOSIS — Z12.4 SCREENING FOR CERVICAL CANCER: ICD-10-CM

## 2022-11-07 DIAGNOSIS — N95.0 POSTMENOPAUSAL BLEEDING: ICD-10-CM

## 2022-11-07 DIAGNOSIS — Z01.419 WELL WOMAN EXAM: Primary | ICD-10-CM

## 2022-11-07 PROCEDURE — 99999 PR PBB SHADOW E&M-EST. PATIENT-LVL III: CPT | Mod: PBBFAC,,, | Performed by: OBSTETRICS & GYNECOLOGY

## 2022-11-07 PROCEDURE — 99386 PR PREVENTIVE VISIT,NEW,40-64: ICD-10-PCS | Mod: S$GLB,,, | Performed by: OBSTETRICS & GYNECOLOGY

## 2022-11-07 PROCEDURE — 88175 CYTOPATH C/V AUTO FLUID REDO: CPT | Performed by: OBSTETRICS & GYNECOLOGY

## 2022-11-07 PROCEDURE — 99386 PREV VISIT NEW AGE 40-64: CPT | Mod: S$GLB,,, | Performed by: OBSTETRICS & GYNECOLOGY

## 2022-11-07 PROCEDURE — 99999 PR PBB SHADOW E&M-EST. PATIENT-LVL III: ICD-10-PCS | Mod: PBBFAC,,, | Performed by: OBSTETRICS & GYNECOLOGY

## 2022-11-07 NOTE — PROGRESS NOTES
GYNECOLOGY OFFICE NOTE    Reason for visit: annual    HPI: Pt is a 57 y.o.  female  who presents for annual. Menarche: 7. No cycles since 2017 after D&C but does endorse having occasional tissue in toilet after urination (last seen 1 month ago). Denies pelvic pain.  She is not sexually active.  She denies vaginal discharge.  Last pap: 2017, denies hx of abnormal. Last MMG 10/22- negative.     D&C hysteroscopy pathology:2017  d&C hysteroscopy:FRAGMENTS OF BENIGN ENDOMETRIAL TISSUE IN A PATTERN CONSISTENT WITH A BENIGN POLYP. IN  ADDITION THERE ARE FRAGMENTS OF MUSCLE PRESENT WHICH MAY REPRESENT A SUBMUCOSAL LEIOMYOMA. NO MALIGNANCY OR ATYPICAL HYPERPLASIA IDENTIFIED      Past Medical History:   Diagnosis Date    Anemia     Arthritis     Psoriasis     Thyroid disease        Past Surgical History:   Procedure Laterality Date    ANKLE DEBRIDEMENT      CHOLECYSTECTOMY      D&C hysteroscopy  2017    TUBAL LIGATION         Family History   Problem Relation Age of Onset    Diabetes Mother     Breast cancer Mother     Heart attack Mother     Heart disease Mother         CHF    Diabetes Father     Cancer Father         mouth    Stomach cancer Father     Arthritis Sister     No Known Problems Brother     No Known Problems Daughter     Hypothyroidism Son     Diabetes Sister        Social History     Tobacco Use    Smoking status: Never    Smokeless tobacco: Never   Substance Use Topics    Alcohol use: Yes     Comment: occasional    Drug use: No       OB History    Para Term  AB Living   2 2 2     2   SAB IAB Ectopic Multiple Live Births                  # Outcome Date GA Lbr Brian/2nd Weight Sex Delivery Anes PTL Lv   2 Term      Vag-Spont      1 Term      Vag-Spont          Current Outpatient Medications   Medication Sig    SYNTHROID 175 mcg tablet TAKE 1 TABLET(175 MCG) BY MOUTH EVERY DAY    ibuprofen 200 mg Cap Take by mouth.    levothyroxine (SYNTHROID, LEVOTHROID) 175 MCG tablet  "Take 1 tablet (175 mcg total) by mouth before breakfast. (Patient not taking: Reported on 9/28/2022)     No current facility-administered medications for this visit.       Allergies: Codeine     /75   Ht 5' 7" (1.702 m)   Wt (!) 158.4 kg (349 lb 3.3 oz)   LMP 05/25/2017 (Approximate)   BMI 54.69 kg/m²     ROS:  GENERAL: Denies fever or chills.   SKIN: Denies rash or lesions.   HEAD: Denies head injury or headache.   CHEST: Denies chest pain or shortness of breath.   CARDIOVASCULAR: Denies palpitations or chest pain.   ABDOMEN: No constipation, diarrhea, nausea, vomiting or rectal bleeding.   URINARY: No dysuria, hematuria, or burning on urination.  REPRODUCTIVE: See HPI.   BREASTS: see HPI  NEUROLOGIC: Denies syncope or weakness.     Physical Exam:  GENERAL: alert, appears stated age and cooperative  NEUROLOGIC: orientated to person, place and time, normal mood and affect   CHEST: Normal respiratory effort  NECK: normal appearance  SKIN: no acne, hirsutism  BREAST EXAM: breasts appear normal, no suspicious masses, no skin or nipple changes or axillary nodes  ABDOMEN: abdomen is soft without significant tenderness, masses  EXTERNAL GENITALIA:  normal general appearance  URETHRA: normal urethra, normal urethral meatus  VAGINA:  normal mucosa, no  lesions  CERVIX:  Normal  UTERUS:  exam limited by habitus  ADNEXA: nontender    Diagnosis:  1. Well woman exam    2. Screening for cervical cancer    3. Postmenopausal bleeding        Plan:   1. Annual  2. Pap today  3. Discussed any brown or red discharge/tissue is abnormal and need for pelvic US and likely EMBX.  Pt voiced understanding.       Orders Placed This Encounter    US Pelvis Comp with Transvag NON-OB (xpd    Liquid-Based Pap Smear, Screening           Vivi Hackett MD  OB/GYN    "

## 2022-11-14 ENCOUNTER — HOSPITAL ENCOUNTER (OUTPATIENT)
Dept: RADIOLOGY | Facility: HOSPITAL | Age: 57
Discharge: HOME | End: 2022-11-14
Attending: OBSTETRICS & GYNECOLOGY
Payer: COMMERCIAL

## 2022-11-14 DIAGNOSIS — N95.0 POSTMENOPAUSAL BLEEDING: ICD-10-CM

## 2022-11-14 LAB
CLINICAL INFO: NORMAL
CYTO CVX: NORMAL
CYTOLOGIST CVX/VAG CYTO: NORMAL
CYTOLOGIST CVX/VAG CYTO: NORMAL
CYTOLOGY CMNT CVX/VAG CYTO-IMP: NORMAL
CYTOLOGY PAP THIN PREP EXPLANATION: NORMAL
DATE OF PREVIOUS PAP: NORMAL
DATE PREVIOUS BX: NO
GEN CATEG CVX/VAG CYTO-IMP: NORMAL
LMP START DATE: 2017
MICROORGANISM CVX/VAG CYTO: NORMAL
PATHOLOGIST CVX/VAG CYTO: NORMAL
SERVICE CMNT-IMP: NORMAL
SPECIMEN SOURCE CVX/VAG CYTO: NORMAL
STAT OF ADQ CVX/VAG CYTO-IMP: NORMAL

## 2022-11-14 PROCEDURE — 76830 TRANSVAGINAL US NON-OB: CPT | Mod: TC,PO

## 2022-11-16 ENCOUNTER — TELEPHONE (OUTPATIENT)
Dept: OBSTETRICS AND GYNECOLOGY | Facility: CLINIC | Age: 57
End: 2022-11-16
Payer: COMMERCIAL

## 2022-11-17 NOTE — TELEPHONE ENCOUNTER
----- Message from Vivi Hackett MD sent at 11/16/2022  4:25 PM CST -----  Can we get her scheduled for an EMBX

## 2022-11-17 NOTE — TELEPHONE ENCOUNTER
Contacted pt and informed of U/S results. Informed pt Dr. Hackett recommends an EMBX, explained process to pt. Patient verbalized understanding. Pt schdeuled for 12/1 at 1315 in [facility]. Patient verbalized understanding.

## 2022-12-01 ENCOUNTER — OFFICE VISIT (OUTPATIENT)
Dept: OBSTETRICS AND GYNECOLOGY | Facility: CLINIC | Age: 57
End: 2022-12-01
Payer: COMMERCIAL

## 2022-12-01 VITALS — WEIGHT: 293 LBS | BODY MASS INDEX: 53.44 KG/M2 | SYSTOLIC BLOOD PRESSURE: 126 MMHG | DIASTOLIC BLOOD PRESSURE: 73 MMHG

## 2022-12-01 DIAGNOSIS — N95.0 POSTMENOPAUSAL BLEEDING: Primary | ICD-10-CM

## 2022-12-01 PROCEDURE — 88305 TISSUE EXAM BY PATHOLOGIST: CPT | Performed by: PATHOLOGY

## 2022-12-01 PROCEDURE — 58100 PR BIOPSY OF UTERUS LINING: ICD-10-PCS | Mod: S$GLB,,, | Performed by: OBSTETRICS & GYNECOLOGY

## 2022-12-01 PROCEDURE — 99999 PR PBB SHADOW E&M-EST. PATIENT-LVL III: ICD-10-PCS | Mod: PBBFAC,,, | Performed by: OBSTETRICS & GYNECOLOGY

## 2022-12-01 PROCEDURE — 88305 TISSUE EXAM BY PATHOLOGIST: CPT | Mod: 26,,, | Performed by: PATHOLOGY

## 2022-12-01 PROCEDURE — 88305 TISSUE EXAM BY PATHOLOGIST: ICD-10-PCS | Mod: 26,,, | Performed by: PATHOLOGY

## 2022-12-01 PROCEDURE — 99999 PR PBB SHADOW E&M-EST. PATIENT-LVL III: CPT | Mod: PBBFAC,,, | Performed by: OBSTETRICS & GYNECOLOGY

## 2022-12-01 PROCEDURE — 99499 NO LOS: ICD-10-PCS | Mod: S$GLB,,, | Performed by: OBSTETRICS & GYNECOLOGY

## 2022-12-01 PROCEDURE — 58100 BIOPSY OF UTERUS LINING: CPT | Mod: S$GLB,,, | Performed by: OBSTETRICS & GYNECOLOGY

## 2022-12-01 PROCEDURE — 99499 UNLISTED E&M SERVICE: CPT | Mod: S$GLB,,, | Performed by: OBSTETRICS & GYNECOLOGY

## 2022-12-01 NOTE — PROGRESS NOTES
GYNECOLOGY OFFICE NOTE    Reason for visit: EMBX    HPI: Pt is a 57 y.o.  female  who presents for EMBX. Pt recently seen for annual. Last visit was in  after she was noted to have a cervical polyp and postmentopausal bleeding. Pt underwent D&C with Dr. Barnes. Pt had hx of endometrial hyperplasia under the care of Dr. Wiedemann in . Pt reported issue with passing tissue at time of last visit and US was obtained (see results below). Pt scheduled to return for EMBX. Pt states since last visit had 4 days of bleeding and passage of more tissue. Denies pelvic pain.     Pelvic US:2022  The uterus measures 10.6 cm in craniocaudal dimension by 6.4 cm in AP dimension by 6.4 cm in mediolateral dimension.  The endometrial stripe thickness measures 12 mm.  There is a 32 mm heterogeneous mass in the posterior aspect of the body of the uterus.  There is a 15 mm hypoechoic mass in the anterior aspect of the body of the uterus.  The ovaries were not visualized.  The urinary bladder is normal in appearance.  There is no free fluid visualized within the pelvis.  Impression:  1. The endometrial stripe is abnormally thickened.  It measures 12 mm in thickness.  This is consistent with the patient's history.  2. If clinically indicated, I recommend consideration of an endometrial biopsy.  3. There is a 32 mm heterogeneous mass in the posterior aspect of the body of the uterus.  There is a 15 mm hypoechoic mass in the anterior aspect of the body of the uterus.  These are characteristic of fibroids.  4. The ovaries were not visualized.    Past Medical History:   Diagnosis Date    Anemia     Arthritis     Psoriasis     Thyroid disease        Past Surgical History:   Procedure Laterality Date    ANKLE DEBRIDEMENT      CHOLECYSTECTOMY      D&C hysteroscopy  2017    TUBAL LIGATION         Family History   Problem Relation Age of Onset    Diabetes Father     Cancer Father         mouth    Stomach cancer  Father     Diabetes Mother     Breast cancer Mother     Heart attack Mother     Heart disease Mother         CHF    No Known Problems Brother     Arthritis Sister     Diabetes Sister     No Known Problems Daughter     Hypothyroidism Son     Colon cancer Neg Hx     Ovarian cancer Neg Hx        Social History     Tobacco Use    Smoking status: Never    Smokeless tobacco: Never   Substance Use Topics    Alcohol use: Yes     Comment: occasional    Drug use: No       OB History    Para Term  AB Living   2 2 2     2   SAB IAB Ectopic Multiple Live Births                  # Outcome Date GA Lbr Brian/2nd Weight Sex Delivery Anes PTL Lv   2 Term      Vag-Spont      1 Term      Vag-Spont          Current Outpatient Medications   Medication Sig    ibuprofen 200 mg Cap Take by mouth.    SYNTHROID 175 mcg tablet TAKE 1 TABLET(175 MCG) BY MOUTH EVERY DAY    levothyroxine (SYNTHROID, LEVOTHROID) 175 MCG tablet Take 1 tablet (175 mcg total) by mouth before breakfast. (Patient not taking: Reported on 2022)     No current facility-administered medications for this visit.       Allergies: Codeine     /73   Wt (!) 154.8 kg (341 lb 3.2 oz)   LMP 2017 (Approximate)   BMI 53.44 kg/m²     ROS:  GENERAL: Denies fever or chills.   SKIN: Denies rash or lesions.   HEAD: Denies head injury or headache.   CHEST: Denies chest pain or shortness of breath.   CARDIOVASCULAR: Denies palpitations or chest pain.   ABDOMEN: No constipation, diarrhea, nausea, vomiting or rectal bleeding.   URINARY: No dysuria, hematuria, or burning on urination.  REPRODUCTIVE: See HPI.   BREASTS: see HPI  NEUROLOGIC: Denies syncope or weakness.     PROCEDURE:  DATE: 2022    PROCEDURE: Endometrial biopsy    INDICATION:  postmenopausal bleeding    PATIENT CONSENT:     PRE ENDOMETRIAL BIOPSY COUNSELING:The patient was informed of the risk of bleeding, infection, uterine perforation and pain and that the test will rule-out endometrial  cancer with accuracy greater than 95%. She was counseled on the alternatives to endometrial biopsy.  All of her questions were answered. Counseling lasted approximately 15 minutes and all her questions were answered.    Patient gives written consent    ANESTHESIA: None    Labs: UPT performed prior to the procedure was negative.    TIME OUT PERFORMED    PROCEDURE NOTE:  The cervix was visualized with a speculum and swabbed with Betadinex3.  The anterior lip of the cervix was not grasped with a single toothed tenaculum, and a sterile endometrial pipelle was inserted into the uterus to a sound length of 9 cm. 2 passes were made with the pipelle and scant amount of tissue was obtained. The specimen was placed in formalin and sent to Pathology for evaluation. The speculum was removed.     COMPLICATIONS: None    DISPOSITION: The patient tolerated the above procedure well.    POST ENDOMETRIAL BIOPSY COUNSELING:  - Manage post biopsy cramping with NSAIDs or Tylenol.  - Expect spotting or light bleeding for a few days.  - Report bleeding heavier than a period, fever > 101.0 F, worsening pain or a foul smelling vaginal discharge.    Follow-up pending biopsy results       Diagnosis:  1. Postmenopausal bleeding        Plan:   1. F/u embx- discussed potential pathology    Orders Placed This Encounter    Specimen to Pathology, Ob/Gyn       Vivi Hackett MD  OB/GYN

## 2022-12-15 LAB
FINAL PATHOLOGIC DIAGNOSIS: NORMAL
GROSS: NORMAL
Lab: NORMAL

## 2023-01-19 ENCOUNTER — TELEPHONE (OUTPATIENT)
Dept: FAMILY MEDICINE | Facility: CLINIC | Age: 58
End: 2023-01-19
Payer: COMMERCIAL

## 2023-01-19 RX ORDER — GABAPENTIN 100 MG/1
100 CAPSULE ORAL 3 TIMES DAILY PRN
Qty: 90 CAPSULE | Refills: 11 | Status: SHIPPED | OUTPATIENT
Start: 2023-01-19

## 2023-01-19 NOTE — TELEPHONE ENCOUNTER
----- Message from Branden Hernandez sent at 1/19/2023  8:33 AM CST -----  Type:  Needs Medical Advice    Who Called: Pt   Symptoms (please be specific): Pt calling regarding her sciatic nerve to the right side of her body from the back down to her leg pt states it feels like it on fire and would like medication called into pharmacy    How long has patient had these symptoms:  3 days  Pharmacy name and phone #:  [id number] - [address] AT [facility] [address]   Phone: [phone]  Fax:  [phone]  Would the patient rather a call back or a response via [facility]? call  Best Call Back Number: [phone]  Additional Information: pt feels that she does not need to be seen or visit the ER

## 2023-02-22 ENCOUNTER — PATIENT MESSAGE (OUTPATIENT)
Dept: BARIATRICS | Facility: CLINIC | Age: 58
End: 2023-02-22
Payer: COMMERCIAL

## 2023-05-01 ENCOUNTER — OFFICE VISIT (OUTPATIENT)
Dept: FAMILY MEDICINE | Facility: CLINIC | Age: 58
End: 2023-05-01
Payer: COMMERCIAL

## 2023-05-01 VITALS
DIASTOLIC BLOOD PRESSURE: 72 MMHG | HEART RATE: 118 BPM | SYSTOLIC BLOOD PRESSURE: 122 MMHG | WEIGHT: 293 LBS | HEIGHT: 67 IN | BODY MASS INDEX: 45.99 KG/M2 | OXYGEN SATURATION: 98 % | TEMPERATURE: 98 F

## 2023-05-01 DIAGNOSIS — R07.9 CHEST PAIN, UNSPECIFIED TYPE: ICD-10-CM

## 2023-05-01 DIAGNOSIS — E03.9 ACQUIRED HYPOTHYROIDISM: Primary | ICD-10-CM

## 2023-05-01 DIAGNOSIS — R73.9 HYPERGLYCEMIA: ICD-10-CM

## 2023-05-01 DIAGNOSIS — E66.01 CLASS 3 SEVERE OBESITY WITHOUT SERIOUS COMORBIDITY WITH BODY MASS INDEX (BMI) OF 50.0 TO 59.9 IN ADULT, UNSPECIFIED OBESITY TYPE: ICD-10-CM

## 2023-05-01 DIAGNOSIS — Z53.1 REFUSAL OF BLOOD TRANSFUSIONS AS PATIENT IS JEHOVAH'S WITNESS: ICD-10-CM

## 2023-05-01 DIAGNOSIS — Z13.6 SCREENING FOR CARDIOVASCULAR CONDITION: ICD-10-CM

## 2023-05-01 PROCEDURE — 99214 PR OFFICE/OUTPT VISIT, EST, LEVL IV, 30-39 MIN: ICD-10-PCS | Mod: S$GLB,,, | Performed by: STUDENT IN AN ORGANIZED HEALTH CARE EDUCATION/TRAINING PROGRAM

## 2023-05-01 PROCEDURE — 99214 OFFICE O/P EST MOD 30 MIN: CPT | Mod: S$GLB,,, | Performed by: STUDENT IN AN ORGANIZED HEALTH CARE EDUCATION/TRAINING PROGRAM

## 2023-05-06 NOTE — PROGRESS NOTES
Patient ID: Radha Mike is a 57 y.o. female.     Chief Complaint: Follow-up    Chest Pain   This is a new problem. The current episode started 1 to 4 weeks ago. The onset quality is gradual. The problem occurs intermittently. The problem has been waxing and waning. The pain is present in the substernal region. The pain is at a severity of 3/10. The pain is mild. The quality of the pain is described as squeezing. The pain does not radiate. Pertinent negatives include no back pain, cough, diaphoresis, exertional chest pressure, fever, headaches, irregular heartbeat, leg pain, lower extremity edema, nausea, near-syncope, numbness, shortness of breath, vomiting or weakness. The pain is aggravated by exertion. She has tried nothing for the symptoms. Risk factors include lack of exercise and obesity.        Review of Systems  Review of Systems   Constitutional:  Negative for diaphoresis and fever.   HENT:  Negative for ear pain and sinus pain.    Eyes:  Negative for discharge.   Respiratory:  Negative for cough and shortness of breath.    Cardiovascular:  Positive for chest pain. Negative for leg swelling and near-syncope.   Gastrointestinal:  Negative for diarrhea, nausea and vomiting.   Genitourinary:  Negative for urgency.   Musculoskeletal:  Negative for back pain and myalgias.   Skin:  Negative for rash.   Neurological:  Negative for weakness, numbness and headaches.   Psychiatric/Behavioral:  Negative for depression.    All other systems reviewed and are negative.    Currently Medications  Current Outpatient Medications on File Prior to Visit   Medication Sig Dispense Refill    gabapentin (NEURONTIN) 100 MG capsule Take 1 capsule (100 mg total) by mouth 3 (three) times daily as needed. 90 capsule 11    ibuprofen 200 mg Cap Take by mouth.      SYNTHROID 175 mcg tablet TAKE 1 TABLET(175 MCG) BY MOUTH EVERY DAY 90 tablet 1     No current facility-administered medications on file prior to visit.       Physical   "Exam  Vitals:    05/01/23 1530   BP: 122/72   BP Location: Right arm   Patient Position: Sitting   Pulse: (!) 118   Temp: 97.9 °F (36.6 °C)   SpO2: 98%   Weight: (!) 143 kg (315 lb 4.1 oz)   Height: 5' 7" (1.702 m)      Body mass index is 49.38 kg/m².  Wt Readings from Last 3 Encounters:   05/01/23 (!) 143 kg (315 lb 4.1 oz)   12/01/22 (!) 154.8 kg (341 lb 3.2 oz)   11/07/22 (!) 158.4 kg (349 lb 3.3 oz)       Physical Exam  Vitals and nursing note reviewed.   Constitutional:       General: She is not in acute distress.     Appearance: She is not ill-appearing.   HENT:      Head: Normocephalic and atraumatic.      Right Ear: External ear normal.      Left Ear: External ear normal.      Nose: Nose normal.      Mouth/Throat:      Mouth: Mucous membranes are moist.   Eyes:      Extraocular Movements: Extraocular movements intact.      Conjunctiva/sclera: Conjunctivae normal.   Cardiovascular:      Rate and Rhythm: Normal rate and regular rhythm.      Pulses: Normal pulses.      Heart sounds: No murmur heard.  Pulmonary:      Effort: Pulmonary effort is normal. No respiratory distress.      Breath sounds: No wheezing.   Abdominal:      General: There is no distension.      Palpations: Abdomen is soft. There is no mass.      Tenderness: There is no abdominal tenderness.   Musculoskeletal:         General: No swelling.      Cervical back: Normal range of motion.   Skin:     Coloration: Skin is not jaundiced.      Findings: No rash.   Neurological:      General: No focal deficit present.      Mental Status: She is alert and oriented to person, place, and time.   Psychiatric:         Mood and Affect: Mood normal.         Thought Content: Thought content normal.       Labs:    Complete Blood Count  Lab Results   Component Value Date    RBC 5.48 (H) 10/03/2022    HGB 12.4 10/03/2022    HCT 40.8 10/03/2022    MCV 75 (L) 10/03/2022    MCH 22.6 (L) 10/03/2022    MCHC 30.4 (L) 10/03/2022    RDW 15.7 (H) 10/03/2022     " 10/03/2022    MPV 10.6 10/03/2022    GRAN 5.7 10/03/2022    GRAN 66.2 10/03/2022    LYMPH 2.1 10/03/2022    LYMPH 24.0 10/03/2022    MONO 0.5 10/03/2022    MONO 6.2 10/03/2022    EOS 0.3 10/03/2022    BASO 0.03 10/03/2022    EOSINOPHIL 3.0 10/03/2022    BASOPHIL 0.3 10/03/2022    DIFFMETHOD Automated 10/03/2022       Comprehensive Metabolic Panel  No results found for: GLU, BUN, CREATININE, NA, K, CL, PROT, ALBUMIN, BILITOT, AST, ALKPHOS, CO2, ALT, ANIONGAP, EGFRNONAA, ESTGFRAFRICA    TSH  No results found for: TSH    Imaging:  US Pelvis Comp with Transvag NON-OB (xpd  Narrative: EXAMINATION:  US PELVIS COMP WITH TRANSVAG NON-OB (XPD)    CLINICAL HISTORY:  Postmenopausal bleeding    TECHNIQUE:  Multiple static ultrasound images are submitted for interpretation.    COMPARISON:  None    FINDINGS:  The uterus measures 10.6 cm in craniocaudal dimension by 6.4 cm in AP dimension by 6.4 cm in mediolateral dimension.  The endometrial stripe thickness measures 12 mm.  There is a 32 mm heterogeneous mass in the posterior aspect of the body of the uterus.  There is a 15 mm hypoechoic mass in the anterior aspect of the body of the uterus.    The ovaries were not visualized.    The urinary bladder is normal in appearance.  There is no free fluid visualized within the pelvis.  Impression: 1. The endometrial stripe is abnormally thickened.  It measures 12 mm in thickness.  This is consistent with the patient's history.  2. If clinically indicated, I recommend consideration of an endometrial biopsy.  3. There is a 32 mm heterogeneous mass in the posterior aspect of the body of the uterus.  There is a 15 mm hypoechoic mass in the anterior aspect of the body of the uterus.  These are characteristic of fibroids.  4. The ovaries were not visualized.    Electronically signed by: Tremaine Shelton MD  Date:    11/14/2022  Time:    14:56      Assessment/Plan:    1. Acquired hypothyroidism  Overview:  Has gland     Orders:  -     TSH;  Future; Expected date: 05/01/2023    2. Class 3 severe obesity without serious comorbidity with body mass index (BMI) of 50.0 to 59.9 in adult, unspecified obesity type  -     CBC Auto Differential; Future  -     Comprehensive metabolic panel; Future; Expected date: 05/01/2023    3. Hyperglycemia  -     HEMOGLOBIN A1C; Future; Expected date: 05/01/2023    4. Screening for cardiovascular condition  -     LIPID PANEL; Future; Expected date: 05/01/2023    5. Refusal of blood transfusions as patient is [place]    6. Chest pain, unspecified type  -     NM Myocardial Perfusion Spect Multi Pharmacologic; Future; Expected date: 05/01/2023  -     Nuclear Stress Test; Future         Discussed how to stay healthy including: diet, exercise, refraining from smoking and discussed screening exams / tests needed for age, sex and family Hx.        Jose Brito MD

## 2023-05-16 ENCOUNTER — TELEPHONE (OUTPATIENT)
Dept: FAMILY MEDICINE | Facility: CLINIC | Age: 58
End: 2023-05-16
Payer: COMMERCIAL

## 2023-05-31 ENCOUNTER — PATIENT OUTREACH (OUTPATIENT)
Dept: ADMINISTRATIVE | Facility: OTHER | Age: 58
End: 2023-05-31
Payer: COMMERCIAL

## 2023-05-31 NOTE — PROGRESS NOTES
CHW - Initial Contact    This Community Health Worker completed the Social Determinant of Health questionnaire with MRN [id number] over the phone today.    Pt identified barriers of most importance are: No barriers reported   Referrals to community agencies completed with patient/caregiver consent outside of [facility] include: No  Referrals were put through [facility] - No  Support and Services: No support & services have been documented.  Other information discussed the patient needs / wants help with: SDOH completed. No assistance requested at this time. No further outreach is required.   Follow up required: No  No future outreach task assigned

## 2023-07-03 NOTE — Clinical Note
Pt needs follow up for annual exam/pap  Zyclara Counseling:  I discussed with the patient the risks of imiquimod including but not limited to erythema, scaling, itching, weeping, crusting, and pain.  Patient understands that the inflammatory response to imiquimod is variable from person to person and was educated regarded proper titration schedule.  If flu-like symptoms develop, patient knows to discontinue the medication and contact us.

## 2023-10-31 DIAGNOSIS — E03.9 ACQUIRED HYPOTHYROIDISM: ICD-10-CM

## 2023-10-31 RX ORDER — LEVOTHYROXINE SODIUM 175 UG/1
175 TABLET ORAL DAILY
Qty: 90 TABLET | Refills: 1 | Status: SHIPPED | OUTPATIENT
Start: 2023-10-31 | End: 2023-11-01 | Stop reason: SDUPTHER

## 2023-10-31 NOTE — TELEPHONE ENCOUNTER
----- Message from Anna Ram sent at 10/31/2023  9:24 AM CDT -----  Type:  RX Refill Request    Who Called: pt  Refill or New Rx:refill  RX Name and Strength:SYNTHROID 175 mcg tablet  How is the patient currently taking it? (ex. 1XDay): TAKE 1 TABLET(175 MCG) BY MOUTH EVERY DAY  Is this a 30 day or 90 day RX:90  Preferred Pharmacy with phone number:[facility] #[id number] - [address] [facility] AT [facility]   Phone:  [phone]  Fax:  [phone]        Local or Mail Order:local  Ordering Provider:Dr Brito  Would the patient rather a call back or a response via [facility]? call  Best Call Back Number:[phone]   Additional Information: pt was told she needs a new prescription and she is completely out of her medication

## 2023-11-01 DIAGNOSIS — E03.9 ACQUIRED HYPOTHYROIDISM: ICD-10-CM

## 2023-11-01 DIAGNOSIS — Z12.31 OTHER SCREENING MAMMOGRAM: ICD-10-CM

## 2023-11-01 RX ORDER — LEVOTHYROXINE SODIUM 175 UG/1
175 TABLET ORAL DAILY
Qty: 90 TABLET | Refills: 1 | Status: SHIPPED | OUTPATIENT
Start: 2023-11-01

## 2023-11-01 NOTE — TELEPHONE ENCOUNTER
----- Message from Chani Mathews sent at 11/1/2023 12:51 PM CDT -----  Type:  RX Refill Request    Who Called: Pt   Refill or New Rx: Refill   RX Name and Strength: SYNTHROID 175 mcg tablet 90 tablet   Preferred Pharmacy with phone number:  [facility] #[id number] - [place], [place] - [address] [facility] AT [facility] [address]  [address] [facility] [address]  Phone: [phone] Fax: [phone]  Local or Mail Order: Local   Ordering Provider: Alisha   Would the patient rather a call back or a response via [facility]? Call back   Best Call Back Number: [phone]   Additional Information: Please be advised, pt stated this would be second call and still hasn't gotten a call back regarding refill, pt said they are out of medication and need refill as soon as possible

## 2023-11-01 NOTE — TELEPHONE ENCOUNTER
Care Due:                  Date            Visit Type   Department     Provider  --------------------------------------------------------------------------------                                EP -                              PRIMARY      [facility] FAM[facility]  Last Visit: 05-      CARE ([facility])   MEDICINE       Jose Brito  Next Visit: None Scheduled  None         None Found                                                            Last  Test          Frequency    Reason                     Performed    Due Date  --------------------------------------------------------------------------------    TSH.........  12 months..  SYNTHROID................  10-   09-    [facility] Embedded Care Due Messages. Reference number: [id number].   11/01/2023 1:30:53 PM CDT

## 2024-06-07 ENCOUNTER — TELEPHONE (OUTPATIENT)
Dept: FAMILY MEDICINE | Facility: CLINIC | Age: 59
End: 2024-06-07
Payer: COMMERCIAL

## 2024-06-07 NOTE — TELEPHONE ENCOUNTER
----- Message from Chani Mathews sent at 6/7/2024  9:00 AM CDT -----  Type:  Same Day Appointment Request    Caller is requesting a same day appointment.  Caller declined first available appointment listed below.    Name of Caller: Pt   When is the first available appointment? 07/02  Symptoms: Had COVID and is still having symptoms such as headaches  Best Call Back Number: [phone]  Additional Information: Please be advised, pt states if both her and  can be seen together for today,  is also having lingering symptoms from having COVID

## 2024-06-07 NOTE — TELEPHONE ENCOUNTER
Spoke with pt and scheduled an appt with Dr. Brito.  is a new patient and stated that he would be finding his own provider.

## 2024-06-12 ENCOUNTER — OFFICE VISIT (OUTPATIENT)
Dept: FAMILY MEDICINE | Facility: CLINIC | Age: 59
End: 2024-06-12
Payer: COMMERCIAL

## 2024-06-12 VITALS
TEMPERATURE: 98 F | BODY MASS INDEX: 45.99 KG/M2 | HEART RATE: 100 BPM | WEIGHT: 293 LBS | OXYGEN SATURATION: 97 % | SYSTOLIC BLOOD PRESSURE: 126 MMHG | DIASTOLIC BLOOD PRESSURE: 76 MMHG | HEIGHT: 67 IN

## 2024-06-12 DIAGNOSIS — R51.9 NONINTRACTABLE HEADACHE, UNSPECIFIED CHRONICITY PATTERN, UNSPECIFIED HEADACHE TYPE: ICD-10-CM

## 2024-06-12 DIAGNOSIS — Z12.31 SCREENING MAMMOGRAM FOR HIGH-RISK PATIENT: ICD-10-CM

## 2024-06-12 DIAGNOSIS — G47.19 EXCESSIVE DAYTIME SLEEPINESS: ICD-10-CM

## 2024-06-12 DIAGNOSIS — Z01.419 WELL WOMAN EXAM: ICD-10-CM

## 2024-06-12 DIAGNOSIS — R73.9 HYPERGLYCEMIA: ICD-10-CM

## 2024-06-12 DIAGNOSIS — Z13.6 SCREENING FOR CARDIOVASCULAR CONDITION: ICD-10-CM

## 2024-06-12 DIAGNOSIS — E66.01 CLASS 3 SEVERE OBESITY DUE TO EXCESS CALORIES WITH SERIOUS COMORBIDITY AND BODY MASS INDEX (BMI) OF 50.0 TO 59.9 IN ADULT: ICD-10-CM

## 2024-06-12 DIAGNOSIS — Z12.11 COLON CANCER SCREENING: ICD-10-CM

## 2024-06-12 DIAGNOSIS — R53.83 FATIGUE, UNSPECIFIED TYPE: Primary | ICD-10-CM

## 2024-06-12 PROCEDURE — 99214 OFFICE O/P EST MOD 30 MIN: CPT | Mod: S$GLB,,, | Performed by: STUDENT IN AN ORGANIZED HEALTH CARE EDUCATION/TRAINING PROGRAM

## 2024-06-13 ENCOUNTER — LAB VISIT (OUTPATIENT)
Dept: LAB | Facility: HOSPITAL | Age: 59
End: 2024-06-13
Attending: STUDENT IN AN ORGANIZED HEALTH CARE EDUCATION/TRAINING PROGRAM
Payer: COMMERCIAL

## 2024-06-13 DIAGNOSIS — Z13.6 SCREENING FOR CARDIOVASCULAR CONDITION: ICD-10-CM

## 2024-06-13 DIAGNOSIS — R53.83 FATIGUE, UNSPECIFIED TYPE: ICD-10-CM

## 2024-06-13 DIAGNOSIS — R73.9 HYPERGLYCEMIA: ICD-10-CM

## 2024-06-13 DIAGNOSIS — E66.01 CLASS 3 SEVERE OBESITY WITHOUT SERIOUS COMORBIDITY WITH BODY MASS INDEX (BMI) OF 50.0 TO 59.9 IN ADULT, UNSPECIFIED OBESITY TYPE: ICD-10-CM

## 2024-06-13 DIAGNOSIS — E03.9 ACQUIRED HYPOTHYROIDISM: ICD-10-CM

## 2024-06-13 LAB
ALBUMIN SERPL BCP-MCNC: 4.2 G/DL (ref 3.5–5.2)
ALBUMIN SERPL BCP-MCNC: 4.2 G/DL (ref 3.5–5.2)
ALP SERPL-CCNC: 81 U/L (ref 38–126)
ALP SERPL-CCNC: 81 U/L (ref 38–126)
ALT SERPL W/O P-5'-P-CCNC: 13 U/L (ref 10–44)
ALT SERPL W/O P-5'-P-CCNC: 13 U/L (ref 10–44)
ANION GAP SERPL CALC-SCNC: 7 MMOL/L (ref 8–16)
ANION GAP SERPL CALC-SCNC: 7 MMOL/L (ref 8–16)
AST SERPL-CCNC: 22 U/L (ref 15–46)
AST SERPL-CCNC: 22 U/L (ref 15–46)
BASOPHILS # BLD AUTO: 0.05 K/UL (ref 0–0.2)
BASOPHILS NFR BLD: 0.5 % (ref 0–1.9)
BILIRUB SERPL-MCNC: 0.6 MG/DL (ref 0.1–1)
BILIRUB SERPL-MCNC: 0.6 MG/DL (ref 0.1–1)
CALCIUM SERPL-MCNC: 9.4 MG/DL (ref 8.7–10.5)
CALCIUM SERPL-MCNC: 9.4 MG/DL (ref 8.7–10.5)
CHLORIDE SERPL-SCNC: 105 MMOL/L (ref 95–110)
CHLORIDE SERPL-SCNC: 105 MMOL/L (ref 95–110)
CHOLEST SERPL-MCNC: 132 MG/DL (ref 120–199)
CHOLEST SERPL-MCNC: 132 MG/DL (ref 120–199)
CHOLEST/HDLC SERPL: 2.5 {RATIO} (ref 2–5)
CHOLEST/HDLC SERPL: 2.5 {RATIO} (ref 2–5)
CO2 SERPL-SCNC: 29 MMOL/L (ref 23–29)
CO2 SERPL-SCNC: 29 MMOL/L (ref 23–29)
CREAT SERPL-MCNC: 0.8 MG/DL (ref 0.5–1.4)
CREAT SERPL-MCNC: 0.8 MG/DL (ref 0.5–1.4)
DIFFERENTIAL METHOD BLD: ABNORMAL
EOSINOPHIL # BLD AUTO: 0.5 K/UL (ref 0–0.5)
EOSINOPHIL NFR BLD: 5.3 % (ref 0–8)
ERYTHROCYTE [DISTWIDTH] IN BLOOD BY AUTOMATED COUNT: 16.2 % (ref 11.5–14.5)
EST. GFR  (NO RACE VARIABLE): >60 ML/MIN/1.73 M^2
EST. GFR  (NO RACE VARIABLE): >60 ML/MIN/1.73 M^2
ESTIMATED AVG GLUCOSE: 126 MG/DL (ref 68–131)
ESTIMATED AVG GLUCOSE: 126 MG/DL (ref 68–131)
FERRITIN SERPL-MCNC: 113 NG/ML (ref 20–300)
GLUCOSE SERPL-MCNC: 103 MG/DL (ref 70–110)
GLUCOSE SERPL-MCNC: 103 MG/DL (ref 70–110)
HBA1C MFR BLD: 6 % (ref 4–5.6)
HBA1C MFR BLD: 6 % (ref 4–5.6)
HCT VFR BLD AUTO: 40.8 % (ref 37–48.5)
HDLC SERPL-MCNC: 53 MG/DL (ref 40–75)
HDLC SERPL-MCNC: 53 MG/DL (ref 40–75)
HDLC SERPL: 40.2 % (ref 20–50)
HDLC SERPL: 40.2 % (ref 20–50)
HGB BLD-MCNC: 12.2 G/DL (ref 12–16)
IMM GRANULOCYTES # BLD AUTO: 0.06 K/UL (ref 0–0.04)
IMM GRANULOCYTES NFR BLD AUTO: 0.6 % (ref 0–0.5)
IRON SERPL-MCNC: 93 UG/DL (ref 30–160)
LDLC SERPL CALC-MCNC: 69.4 MG/DL (ref 63–159)
LDLC SERPL CALC-MCNC: 69.4 MG/DL (ref 63–159)
LYMPHOCYTES # BLD AUTO: 2.1 K/UL (ref 1–4.8)
LYMPHOCYTES NFR BLD: 21.4 % (ref 18–48)
MCH RBC QN AUTO: 22.7 PG (ref 27–31)
MCHC RBC AUTO-ENTMCNC: 29.9 G/DL (ref 32–36)
MCV RBC AUTO: 76 FL (ref 82–98)
MONOCYTES # BLD AUTO: 0.6 K/UL (ref 0.3–1)
MONOCYTES NFR BLD: 5.9 % (ref 4–15)
NEUTROPHILS # BLD AUTO: 6.4 K/UL (ref 1.8–7.7)
NEUTROPHILS NFR BLD: 66.3 % (ref 38–73)
NONHDLC SERPL-MCNC: 79 MG/DL
NONHDLC SERPL-MCNC: 79 MG/DL
NRBC BLD-RTO: 0 /100 WBC
PLATELET # BLD AUTO: 330 K/UL (ref 150–450)
PMV BLD AUTO: 11.2 FL (ref 9.2–12.9)
POTASSIUM SERPL-SCNC: 4.7 MMOL/L (ref 3.5–5.1)
POTASSIUM SERPL-SCNC: 4.7 MMOL/L (ref 3.5–5.1)
PROT SERPL-MCNC: 7.9 G/DL (ref 6–8.4)
PROT SERPL-MCNC: 7.9 G/DL (ref 6–8.4)
RBC # BLD AUTO: 5.38 M/UL (ref 4–5.4)
SATURATED IRON: 26 % (ref 20–50)
SODIUM SERPL-SCNC: 141 MMOL/L (ref 136–145)
SODIUM SERPL-SCNC: 141 MMOL/L (ref 136–145)
T4 FREE SERPL-MCNC: 1.61 NG/DL (ref 0.71–1.51)
TOTAL IRON BINDING CAPACITY: 352 UG/DL (ref 250–450)
TRANSFERRIN SERPL-MCNC: 238 MG/DL (ref 200–375)
TRIGL SERPL-MCNC: 48 MG/DL (ref 30–150)
TRIGL SERPL-MCNC: 48 MG/DL (ref 30–150)
TSH SERPL DL<=0.005 MIU/L-ACNC: 0.05 UIU/ML (ref 0.4–4)
TSH SERPL DL<=0.005 MIU/L-ACNC: 0.05 UIU/ML (ref 0.4–4)
UUN UR-MCNC: 11 MG/DL (ref 7–17)
UUN UR-MCNC: 11 MG/DL (ref 7–17)
WBC # BLD AUTO: 9.59 K/UL (ref 3.9–12.7)

## 2024-06-13 PROCEDURE — 84443 ASSAY THYROID STIM HORMONE: CPT | Mod: PN | Performed by: STUDENT IN AN ORGANIZED HEALTH CARE EDUCATION/TRAINING PROGRAM

## 2024-06-13 PROCEDURE — 82728 ASSAY OF FERRITIN: CPT | Performed by: STUDENT IN AN ORGANIZED HEALTH CARE EDUCATION/TRAINING PROGRAM

## 2024-06-13 PROCEDURE — 80061 LIPID PANEL: CPT | Performed by: STUDENT IN AN ORGANIZED HEALTH CARE EDUCATION/TRAINING PROGRAM

## 2024-06-13 PROCEDURE — 80053 COMPREHEN METABOLIC PANEL: CPT | Mod: PN | Performed by: STUDENT IN AN ORGANIZED HEALTH CARE EDUCATION/TRAINING PROGRAM

## 2024-06-13 PROCEDURE — 36415 COLL VENOUS BLD VENIPUNCTURE: CPT | Mod: PN | Performed by: STUDENT IN AN ORGANIZED HEALTH CARE EDUCATION/TRAINING PROGRAM

## 2024-06-13 PROCEDURE — 85025 COMPLETE CBC W/AUTO DIFF WBC: CPT | Mod: PN | Performed by: STUDENT IN AN ORGANIZED HEALTH CARE EDUCATION/TRAINING PROGRAM

## 2024-06-13 PROCEDURE — 83540 ASSAY OF IRON: CPT | Mod: PN | Performed by: STUDENT IN AN ORGANIZED HEALTH CARE EDUCATION/TRAINING PROGRAM

## 2024-06-13 PROCEDURE — 83036 HEMOGLOBIN GLYCOSYLATED A1C: CPT | Performed by: STUDENT IN AN ORGANIZED HEALTH CARE EDUCATION/TRAINING PROGRAM

## 2024-06-13 PROCEDURE — 84439 ASSAY OF FREE THYROXINE: CPT | Performed by: STUDENT IN AN ORGANIZED HEALTH CARE EDUCATION/TRAINING PROGRAM

## 2024-06-13 NOTE — PROGRESS NOTES
Patient ID: Radha Mike is a 58 y.o. female.     Chief Complaint: Fatigue and Headache    Fatigue  This is a new problem. Episode onset: since being diagnosed with covid a few weeks ago. The problem has been unchanged. Associated symptoms include fatigue and headaches. Pertinent negatives include no chest pain, chills, congestion, coughing, fever, myalgias, nausea, numbness, rash, vomiting or weakness. Nothing aggravates the symptoms. She has tried nothing for the symptoms.   Headache   This is a new problem. Episode onset: after being diagnosed with covid a few weeks ago. The problem occurs daily. The problem has been unchanged. The pain is located in the Frontal region. The pain does not radiate. The quality of the pain is described as aching. Pertinent negatives include no coughing, drainage, ear pain, eye pain, eye redness, fever, nausea, numbness, phonophobia, photophobia, vomiting or weakness. Nothing aggravates the symptoms. She has tried acetaminophen for the symptoms. The treatment provided no relief.          Review of Systems  Review of Systems   Constitutional:  Positive for fatigue. Negative for chills and fever.   HENT:  Negative for congestion, ear pain and sinus pain.    Eyes:  Negative for photophobia, pain, discharge and redness.   Respiratory:  Negative for cough and shortness of breath.    Cardiovascular:  Negative for chest pain and leg swelling.   Gastrointestinal:  Negative for diarrhea, nausea and vomiting.   Genitourinary:  Negative for urgency.   Musculoskeletal:  Negative for myalgias.   Skin:  Negative for rash.   Neurological:  Positive for headaches. Negative for weakness and numbness.   Psychiatric/Behavioral:  Negative for depression.    All other systems reviewed and are negative.      Currently Medications  Current Outpatient Medications on File Prior to Visit   Medication Sig Dispense Refill    gabapentin (NEURONTIN) 100 MG capsule Take 1 capsule (100 mg total) by mouth 3  "(three) times daily as needed. 90 capsule 11    ibuprofen 200 mg Cap Take by mouth.      SYNTHROID 175 mcg tablet Take 1 tablet (175 mcg total) by mouth once daily. 90 tablet 1     No current facility-administered medications on file prior to visit.       Physical  Exam  Vitals:    06/12/24 1313   BP: 126/76   BP Location: Right arm   Patient Position: Sitting   Pulse: 100   Temp: 98.1 °F (36.7 °C)   SpO2: 97%   Weight: (!) 165.9 kg (365 lb 13.7 oz)   Height: 5' 7" (1.702 m)      Body mass index is 57.3 kg/m².  Wt Readings from Last 3 Encounters:   06/12/24 (!) 165.9 kg (365 lb 13.7 oz)   05/01/23 (!) 143 kg (315 lb 4.1 oz)   12/01/22 (!) 154.8 kg (341 lb 3.2 oz)       Physical Exam  Vitals and nursing note reviewed.   Constitutional:       General: She is not in acute distress.     Appearance: She is obese. She is not ill-appearing.   HENT:      Head: Normocephalic and atraumatic.      Right Ear: External ear normal.      Left Ear: External ear normal.      Nose: Nose normal.      Mouth/Throat:      Mouth: Mucous membranes are moist.   Eyes:      Extraocular Movements: Extraocular movements intact.      Conjunctiva/sclera: Conjunctivae normal.   Cardiovascular:      Rate and Rhythm: Normal rate and regular rhythm.      Pulses: Normal pulses.      Heart sounds: No murmur heard.  Pulmonary:      Effort: Pulmonary effort is normal. No respiratory distress.      Breath sounds: No wheezing.   Abdominal:      General: There is no distension.      Palpations: Abdomen is soft. There is no mass.      Tenderness: There is no abdominal tenderness.   Musculoskeletal:         General: No swelling.      Cervical back: Normal range of motion.   Skin:     Coloration: Skin is not jaundiced.      Findings: No rash.   Neurological:      General: No focal deficit present.      Mental Status: She is alert and oriented to person, place, and time.   Psychiatric:         Mood and Affect: Mood normal.         Thought Content: Thought " "content normal.         Labs:    Complete Blood Count  Lab Results   Component Value Date    RBC 5.48 (H) 10/03/2022    HGB 12.4 10/03/2022    HCT 40.8 10/03/2022    MCV 75 (L) 10/03/2022    MCH 22.6 (L) 10/03/2022    MCHC 30.4 (L) 10/03/2022    RDW 15.7 (H) 10/03/2022     10/03/2022    MPV 10.6 10/03/2022    GRAN 5.7 10/03/2022    GRAN 66.2 10/03/2022    LYMPH 2.1 10/03/2022    LYMPH 24.0 10/03/2022    MONO 0.5 10/03/2022    MONO 6.2 10/03/2022    EOS 0.3 10/03/2022    BASO 0.03 10/03/2022    EOSINOPHIL 3.0 10/03/2022    BASOPHIL 0.3 10/03/2022    DIFFMETHOD Automated 10/03/2022       Comprehensive Metabolic Panel  No results found for: "GLU", "BUN", "CREATININE", "NA", "K", "CL", "PROT", "ALBUMIN", "BILITOT", "AST", "ALKPHOS", "CO2", "ALT", "ANIONGAP", "EGFRNONAA", "ESTGFRAFRICA"    TSH  No results found for: "TSH"    Imaging:  US Pelvis Comp with Transvag NON-OB (xpd  Narrative: EXAMINATION:  US PELVIS COMP WITH TRANSVAG NON-OB (XPD)    CLINICAL HISTORY:  Postmenopausal bleeding    TECHNIQUE:  Multiple static ultrasound images are submitted for interpretation.    COMPARISON:  None    FINDINGS:  The uterus measures 10.6 cm in craniocaudal dimension by 6.4 cm in AP dimension by 6.4 cm in mediolateral dimension.  The endometrial stripe thickness measures 12 mm.  There is a 32 mm heterogeneous mass in the posterior aspect of the body of the uterus.  There is a 15 mm hypoechoic mass in the anterior aspect of the body of the uterus.    The ovaries were not visualized.    The urinary bladder is normal in appearance.  There is no free fluid visualized within the pelvis.  Impression: 1. The endometrial stripe is abnormally thickened.  It measures 12 mm in thickness.  This is consistent with the patient's history.  2. If clinically indicated, I recommend consideration of an endometrial biopsy.  3. There is a 32 mm heterogeneous mass in the posterior aspect of the body of the uterus.  There is a 15 mm hypoechoic mass " in the anterior aspect of the body of the uterus.  These are characteristic of fibroids.  4. The ovaries were not visualized.    Electronically signed by: Tremaine Shelton MD  Date:    11/14/2022  Time:    14:56      Assessment/Plan:    1. Fatigue, unspecified type  -     CBC Auto Differential; Future  -     Comprehensive metabolic panel; Future; Expected date: 06/12/2024  -     TSH; Future; Expected date: 06/12/2024  -     Iron and TIBC; Future  -     Ferritin; Future    2. Hyperglycemia  -     HEMOGLOBIN A1C; Future; Expected date: 06/12/2024    3. Screening for cardiovascular condition  -     LIPID PANEL; Future; Expected date: 06/12/2024    4. Excessive daytime sleepiness  -     Home Sleep Study; Future    5. Screening mammogram for high-risk patient  -     Mammo Digital Screening Bilat w/ Brody; Future; Expected date: 06/12/2024    6. Well woman exam  -     Ambulatory referral/consult to Obstetrics / Gynecology; Future; Expected date: 06/19/2024    7. Colon cancer screening  -     Ambulatory referral/consult to Endo Procedure ; Future; Expected date: 06/13/2024    8. Nonintractable headache, unspecified chronicity pattern, unspecified headache type    9. Class 3 severe obesity due to excess calories with serious comorbidity and body mass index (BMI) of 50.0 to 59.9 in adult         Discussed how to stay healthy including: diet, exercise, refraining from smoking and discussed screening exams / tests needed for age, sex and family Hx.    RTC 2 weeks    Jose Brito MD

## 2024-06-20 ENCOUNTER — TELEPHONE (OUTPATIENT)
Dept: SLEEP MEDICINE | Facility: OTHER | Age: 59
End: 2024-06-20
Payer: COMMERCIAL

## 2024-06-25 ENCOUNTER — TELEPHONE (OUTPATIENT)
Dept: SLEEP MEDICINE | Facility: OTHER | Age: 59
End: 2024-06-25
Payer: COMMERCIAL

## 2024-06-26 ENCOUNTER — OFFICE VISIT (OUTPATIENT)
Dept: FAMILY MEDICINE | Facility: CLINIC | Age: 59
End: 2024-06-26
Payer: COMMERCIAL

## 2024-06-26 VITALS
DIASTOLIC BLOOD PRESSURE: 88 MMHG | HEIGHT: 67 IN | SYSTOLIC BLOOD PRESSURE: 128 MMHG | HEART RATE: 92 BPM | BODY MASS INDEX: 45.99 KG/M2 | TEMPERATURE: 98 F | WEIGHT: 293 LBS | OXYGEN SATURATION: 98 %

## 2024-06-26 DIAGNOSIS — E03.9 ACQUIRED HYPOTHYROIDISM: ICD-10-CM

## 2024-06-26 DIAGNOSIS — E66.01 CLASS 3 SEVERE OBESITY DUE TO EXCESS CALORIES WITH SERIOUS COMORBIDITY AND BODY MASS INDEX (BMI) OF 50.0 TO 59.9 IN ADULT: Primary | ICD-10-CM

## 2024-06-26 PROCEDURE — 99214 OFFICE O/P EST MOD 30 MIN: CPT | Mod: S$GLB,,, | Performed by: STUDENT IN AN ORGANIZED HEALTH CARE EDUCATION/TRAINING PROGRAM

## 2024-06-26 RX ORDER — LEVOTHYROXINE SODIUM 150 UG/1
150 TABLET ORAL
Qty: 90 TABLET | Refills: 3 | Status: SHIPPED | OUTPATIENT
Start: 2024-06-26

## 2024-06-26 NOTE — PROGRESS NOTES
"   Patient ID: Radha Mike is a 58 y.o. female.     Chief Complaint: f/u labs    Follow-up  Pertinent negatives include no chest pain, coughing, fever, headaches, myalgias, nausea, rash, vomiting or weakness.      Patient here for follow up of lab work. She continues to feel tired. She is having trouble falling asleep and staying asleep. Sleep study is scheduled for next week.     Review of Systems  Review of Systems   Constitutional:  Negative for fever.   HENT:  Negative for ear pain and sinus pain.    Eyes:  Negative for discharge.   Respiratory:  Negative for cough and shortness of breath.    Cardiovascular:  Negative for chest pain and leg swelling.   Gastrointestinal:  Negative for diarrhea, nausea and vomiting.   Genitourinary:  Negative for urgency.   Musculoskeletal:  Negative for myalgias.   Skin:  Negative for rash.   Neurological:  Negative for weakness and headaches.   Psychiatric/Behavioral:  Negative for depression.    All other systems reviewed and are negative.      Currently Medications  Current Outpatient Medications on File Prior to Visit   Medication Sig Dispense Refill    gabapentin (NEURONTIN) 100 MG capsule Take 1 capsule (100 mg total) by mouth 3 (three) times daily as needed. 90 capsule 11    ibuprofen 200 mg Cap Take by mouth.      [DISCONTINUED] SYNTHROID 175 mcg tablet Take 1 tablet (175 mcg total) by mouth once daily. 90 tablet 1     No current facility-administered medications on file prior to visit.       Physical  Exam  Vitals:    06/26/24 1433   BP: 128/88   BP Location: Right arm   Patient Position: Sitting   Pulse: 92   Temp: 98.2 °F (36.8 °C)   SpO2: 98%   Weight: (!) 168.8 kg (372 lb 0.4 oz)   Height: 5' 7" (1.702 m)      Body mass index is 58.27 kg/m².  Wt Readings from Last 3 Encounters:   06/26/24 (!) 168.8 kg (372 lb 0.4 oz)   06/12/24 (!) 165.9 kg (365 lb 13.7 oz)   05/01/23 (!) 143 kg (315 lb 4.1 oz)       Physical Exam  Vitals and nursing note reviewed. "   Constitutional:       General: She is not in acute distress.     Appearance: She is obese. She is not ill-appearing.   HENT:      Head: Normocephalic and atraumatic.      Right Ear: External ear normal.      Left Ear: External ear normal.      Nose: Nose normal.      Mouth/Throat:      Mouth: Mucous membranes are moist.   Eyes:      Extraocular Movements: Extraocular movements intact.      Conjunctiva/sclera: Conjunctivae normal.   Cardiovascular:      Rate and Rhythm: Normal rate and regular rhythm.      Pulses: Normal pulses.      Heart sounds: No murmur heard.  Pulmonary:      Effort: Pulmonary effort is normal. No respiratory distress.      Breath sounds: No wheezing.   Abdominal:      General: There is no distension.      Palpations: Abdomen is soft. There is no mass.      Tenderness: There is no abdominal tenderness.   Musculoskeletal:         General: No swelling.      Cervical back: Normal range of motion.   Skin:     Coloration: Skin is not jaundiced.      Findings: No rash.   Neurological:      General: No focal deficit present.      Mental Status: She is alert and oriented to person, place, and time.   Psychiatric:         Mood and Affect: Mood normal.         Thought Content: Thought content normal.         Labs:    Complete Blood Count  Lab Results   Component Value Date    RBC 5.38 06/13/2024    HGB 12.2 06/13/2024    HCT 40.8 06/13/2024    MCV 76 (L) 06/13/2024    MCH 22.7 (L) 06/13/2024    MCHC 29.9 (L) 06/13/2024    RDW 16.2 (H) 06/13/2024     06/13/2024    MPV 11.2 06/13/2024    GRAN 6.4 06/13/2024    GRAN 66.3 06/13/2024    LYMPH 2.1 06/13/2024    LYMPH 21.4 06/13/2024    MONO 0.6 06/13/2024    MONO 5.9 06/13/2024    EOS 0.5 06/13/2024    BASO 0.05 06/13/2024    EOSINOPHIL 5.3 06/13/2024    BASOPHIL 0.5 06/13/2024    DIFFMETHOD Automated 06/13/2024       Comprehensive Metabolic Panel  Lab Results   Component Value Date     06/13/2024     06/13/2024    BUN 11 06/13/2024     BUN 11 06/13/2024    CREATININE 0.80 06/13/2024    CREATININE 0.80 06/13/2024     06/13/2024     06/13/2024    K 4.7 06/13/2024    K 4.7 06/13/2024     06/13/2024     06/13/2024    PROT 7.9 06/13/2024    PROT 7.9 06/13/2024    ALBUMIN 4.2 06/13/2024    ALBUMIN 4.2 06/13/2024    BILITOT 0.6 06/13/2024    BILITOT 0.6 06/13/2024    AST 22 06/13/2024    AST 22 06/13/2024    ALKPHOS 81 06/13/2024    ALKPHOS 81 06/13/2024    CO2 29 06/13/2024    CO2 29 06/13/2024    ALT 13 06/13/2024    ALT 13 06/13/2024    ANIONGAP 7 (L) 06/13/2024    ANIONGAP 7 (L) 06/13/2024       TSH  Lab Results   Component Value Date    TSH 0.054 (L) 06/13/2024    TSH 0.054 (L) 06/13/2024       Imaging:  US Pelvis Comp with Transvag NON-OB (xpd  Narrative: EXAMINATION:  US PELVIS COMP WITH TRANSVAG NON-OB (XPD)    CLINICAL HISTORY:  Postmenopausal bleeding    TECHNIQUE:  Multiple static ultrasound images are submitted for interpretation.    COMPARISON:  None    FINDINGS:  The uterus measures 10.6 cm in craniocaudal dimension by 6.4 cm in AP dimension by 6.4 cm in mediolateral dimension.  The endometrial stripe thickness measures 12 mm.  There is a 32 mm heterogeneous mass in the posterior aspect of the body of the uterus.  There is a 15 mm hypoechoic mass in the anterior aspect of the body of the uterus.    The ovaries were not visualized.    The urinary bladder is normal in appearance.  There is no free fluid visualized within the pelvis.  Impression: 1. The endometrial stripe is abnormally thickened.  It measures 12 mm in thickness.  This is consistent with the patient's history.  2. If clinically indicated, I recommend consideration of an endometrial biopsy.  3. There is a 32 mm heterogeneous mass in the posterior aspect of the body of the uterus.  There is a 15 mm hypoechoic mass in the anterior aspect of the body of the uterus.  These are characteristic of fibroids.  4. The ovaries were not visualized.    Electronically  signed by: Tremaine Shelton MD  Date:    11/14/2022  Time:    14:56      Assessment/Plan:    1. Class 3 severe obesity due to excess calories with serious comorbidity and body mass index (BMI) of 50.0 to 59.9 in adult    2. Acquired hypothyroidism  Comments:  -TSH low  - dosage changed  Overview:  Has gland     Orders:  -     SYNTHROID 150 mcg tablet; Take 1 tablet (150 mcg total) by mouth before breakfast.  Dispense: 90 tablet; Refill: 3     Try melatonin for insomnia    Discussed how to stay healthy including: diet, exercise, refraining from smoking and discussed screening exams / tests needed for age, sex and family Hx.    If sleep study does not show sleep apnea, will refer to sleep medicine    Jose Brito MD

## 2024-07-03 ENCOUNTER — HOSPITAL ENCOUNTER (OUTPATIENT)
Dept: SLEEP MEDICINE | Facility: HOSPITAL | Age: 59
Discharge: HOME | End: 2024-07-03
Attending: STUDENT IN AN ORGANIZED HEALTH CARE EDUCATION/TRAINING PROGRAM
Payer: COMMERCIAL

## 2024-07-03 DIAGNOSIS — G47.19 EXCESSIVE DAYTIME SLEEPINESS: ICD-10-CM

## 2024-07-08 ENCOUNTER — PATIENT MESSAGE (OUTPATIENT)
Dept: FAMILY MEDICINE | Facility: CLINIC | Age: 59
End: 2024-07-08
Payer: COMMERCIAL

## 2024-07-08 DIAGNOSIS — G47.20 SLEEP STAGE DYSFUNCTION: Primary | ICD-10-CM

## 2024-07-08 DIAGNOSIS — G47.19 EXCESSIVE DAYTIME SLEEPINESS: ICD-10-CM

## 2024-07-08 PROBLEM — G47.33 OSA (OBSTRUCTIVE SLEEP APNEA): Status: ACTIVE | Noted: 2024-07-08

## 2024-07-18 ENCOUNTER — HOSPITAL ENCOUNTER (OUTPATIENT)
Dept: RADIOLOGY | Facility: HOSPITAL | Age: 59
Discharge: HOME | End: 2024-07-18
Attending: STUDENT IN AN ORGANIZED HEALTH CARE EDUCATION/TRAINING PROGRAM
Payer: COMMERCIAL

## 2024-07-18 DIAGNOSIS — Z12.31 SCREENING MAMMOGRAM FOR HIGH-RISK PATIENT: ICD-10-CM

## 2024-07-18 PROCEDURE — 77063 BREAST TOMOSYNTHESIS BI: CPT | Mod: 26,,, | Performed by: RADIOLOGY

## 2024-07-18 PROCEDURE — 77067 SCR MAMMO BI INCL CAD: CPT | Mod: 26,,, | Performed by: RADIOLOGY

## 2024-07-18 PROCEDURE — 77063 BREAST TOMOSYNTHESIS BI: CPT | Mod: TC,PN

## 2024-08-20 ENCOUNTER — CLINICAL SUPPORT (OUTPATIENT)
Dept: ENDOSCOPY | Facility: HOSPITAL | Age: 59
End: 2024-08-20
Attending: STUDENT IN AN ORGANIZED HEALTH CARE EDUCATION/TRAINING PROGRAM
Payer: COMMERCIAL

## 2024-08-20 ENCOUNTER — TELEPHONE (OUTPATIENT)
Dept: ENDOSCOPY | Facility: HOSPITAL | Age: 59
End: 2024-08-20

## 2024-08-20 VITALS — HEIGHT: 67 IN | WEIGHT: 293 LBS | BODY MASS INDEX: 45.99 KG/M2

## 2024-08-20 DIAGNOSIS — Z12.11 COLON CANCER SCREENING: ICD-10-CM

## 2024-08-20 RX ORDER — SODIUM, POTASSIUM,MAG SULFATES 17.5-3.13G
1 SOLUTION, RECONSTITUTED, ORAL ORAL DAILY
Qty: 1 KIT | Refills: 0 | Status: SHIPPED | OUTPATIENT
Start: 2024-08-20 | End: 2024-08-22

## 2024-08-20 NOTE — TELEPHONE ENCOUNTER
Spoke to pt to schedule procedure(s) Colonoscopy       Physician to perform procedure(s) Dr. FELISHA Rosas  Date of Procedure (s) 11/19/24  Arrival Time 8:00 AM  Time of Procedure(s) 9:00 AM   Location of Procedure(s) [place] 2nd Floor  Type of Rx Prep sent to patient: Suprep  Instructions provided to patient via [facility]/ mailed    Patient was informed on the following information and verbalized understanding. Screening questionnaire reviewed with patient and complete. If procedure requires anesthesia, a responsible adult needs to be present to accompany the patient home, patient cannot drive after receiving anesthesia. Appointment details are tentative, especially check-in time. Patient will receive a prep-op call 7 days prior to confirm check-in time for procedure. If applicable the patient should contact their pharmacy to verify Rx for procedure prep is ready for pick-up. Patient was advised to call the scheduling department at [phone] if pharmacy states no Rx is available. Patient was advised to call the endoscopy scheduling department if any questions or concerns arise.      [facility] Scheduling Department    Patient denies taking any anticoagulants, antiplatelets, GLP-1 medications, or Adipex (Phentermine).

## 2024-09-19 ENCOUNTER — OFFICE VISIT (OUTPATIENT)
Dept: OBSTETRICS AND GYNECOLOGY | Facility: CLINIC | Age: 59
End: 2024-09-19
Payer: COMMERCIAL

## 2024-09-19 VITALS — BODY MASS INDEX: 58.44 KG/M2 | WEIGHT: 293 LBS | SYSTOLIC BLOOD PRESSURE: 137 MMHG | DIASTOLIC BLOOD PRESSURE: 79 MMHG

## 2024-09-19 DIAGNOSIS — N95.0 POSTMENOPAUSAL BLEEDING: ICD-10-CM

## 2024-09-19 DIAGNOSIS — Z12.4 ROUTINE CERVICAL SMEAR: Primary | ICD-10-CM

## 2024-09-19 DIAGNOSIS — Z01.419 WELL WOMAN EXAM: ICD-10-CM

## 2024-09-19 PROCEDURE — 99999 PR PBB SHADOW E&M-EST. PATIENT-LVL III: CPT | Mod: PBBFAC,,, | Performed by: OBSTETRICS & GYNECOLOGY

## 2024-09-19 NOTE — PROGRESS NOTES
OBSTETRIC HISTORY:   OB History          2    Para   2    Term   2            AB        Living   2         SAB        IAB        Ectopic        Multiple        Live Births                    COMPREHENSIVE GYN HISTORY:  PAP History: Denies abnormal Paps.  Infection History: Denies STDs. Denies PID.  Benign History: Denies uterine fibroids. Denies ovarian cysts. Denies endometriosis.   Cancer History: Denies cervical cancer. Denies uterine cancer or hyperplasia. Denies ovarian cancer. Denies vulvar cancer or pre-cancer. Denies vaginal cancer or pre-cancer. Denies breast cancer. Denies colon cancer.  Sexual Activity History:   reports being sexually active and has had partner(s) who are male. She reports using the following method of birth control/protection: Surgical.         HPI:   59 y.o.  Patient's last menstrual period was 2017 (approximate).   Patient is  here for her annual gynecologic exam.  She mentions concerns summarized below:   bleeding      ROS:  GENERAL: No weight gain or weight loss.   CHEST: No shortness of breath.   ABDOMEN: No abdominal pain, constipation, diarrhea, nausea, vomiting or rectal bleeding.   URINARY: No frequency, dysuria, hematuria, or burning on urination.  REPRODUCTIVE: See HPI.   BREASTS: No breast pain, lumps, or nipple discharge.   PSYCHIATRIC: No depression or anxiety.         PE:   /79   Wt (!) 169.3 kg (373 lb 2.1 oz)   LMP 2017 (Approximate)   BMI 58.44 kg/m²   APPEARANCE: Well nourished, well developed, in no acute distress.  NECK: Neck symmetric without  thyromegaly.  NODES: No inguinal, cervical lymph node enlargement.  CHEST: Lungs clear to auscultation.  HEART: Regular rate and rhythm, no murmurs, rubs or gallops.  ABDOMEN: Soft. No tenderness or masses. No hernias.  BREASTS: Symmetrical, no skin changes or visible lesions. No palpable masses, nipple discharge or adenopathy bilaterally.  PELVIC:   VULVA: No lesions. Normal female  genitalia.  URETHRAL MEATUS: Normal size and location, no lesions, no prolapse.  URETHRA: No masses, tenderness, prolapse or scarring.  VAGINA: Atrophic and not well rugated, no discharge, no significant cystocele or rectocele.  CERVIX: No lesions and discharge.   UTERUS: Normal size, regular shape, mobile, non-tender, bladder base nontender.  ADNEXA: No masses or tenderness.    PROCEDURES:  Pap smear  HRHPV    Assessment:  Normal Gynecologic Exam   Bleeding--Pelvic US ordered    Recommendations routine screening and no risk factors:  Mammogram at age 40  Colonoscopy age 45  Bone density age 65  Return to clinic as needed for any problems.  Return to clinic in one year. It is recommended to have a physician breast exam and pelvic exam annually. This is different than doing a Pap smear.       As of April 1, 2021, the Cures Act has been passed nationally. This new law requires that all doctors progress notes, lab results, pathology reports and radiology reports be released IMMEDIATELY to the patient in the patient portal. That means that the results are released to you at the EXACT same time they are released to me. Therefore, with all of the patients that I have I am not able to reply to each patient exactly when the results come in. So there will be a delay from when you see the results to when I see them and have time to come up with a response to send you. Also I only see these results when I am on the computer at work. So if the results come in over the weekend or after 5 pm of a work day, I will not see them until the next business day. As you can tell, this is a challenge as a physician to give every patient the quick response they hope for and deserve. So please be patient!     Thanks for understanding,

## 2024-09-24 ENCOUNTER — HOSPITAL ENCOUNTER (OUTPATIENT)
Dept: RADIOLOGY | Facility: HOSPITAL | Age: 59
Discharge: HOME | End: 2024-09-24
Attending: OBSTETRICS & GYNECOLOGY
Payer: COMMERCIAL

## 2024-09-24 DIAGNOSIS — N95.0 POSTMENOPAUSAL BLEEDING: ICD-10-CM

## 2024-09-24 PROCEDURE — 76856 US EXAM PELVIC COMPLETE: CPT | Mod: TC,PN

## 2024-09-24 PROCEDURE — 76830 TRANSVAGINAL US NON-OB: CPT | Mod: TC,PN

## 2024-09-24 PROCEDURE — 76830 TRANSVAGINAL US NON-OB: CPT | Mod: 26,,, | Performed by: RADIOLOGY

## 2024-09-24 PROCEDURE — 76856 US EXAM PELVIC COMPLETE: CPT | Mod: 26,,, | Performed by: RADIOLOGY

## 2024-09-25 ENCOUNTER — PATIENT MESSAGE (OUTPATIENT)
Dept: OBSTETRICS AND GYNECOLOGY | Facility: CLINIC | Age: 59
End: 2024-09-25
Payer: COMMERCIAL

## 2024-10-14 ENCOUNTER — TELEPHONE (OUTPATIENT)
Dept: OBSTETRICS AND GYNECOLOGY | Facility: CLINIC | Age: 59
End: 2024-10-14
Payer: COMMERCIAL

## 2024-10-14 DIAGNOSIS — N95.0 POSTMENOPAUSAL BLEEDING: Primary | ICD-10-CM

## 2024-10-14 NOTE — TELEPHONE ENCOUNTER
[facility] regarding ultrasound results that were abnormal. Message had been sent in the portal but patient has never reviewed it.

## 2024-10-14 NOTE — TELEPHONE ENCOUNTER
----- Message from Lorelei sent at 10/14/2024  2:34 PM CDT -----  Type:  Patient Returning Call    Who Called: pt   Who Left Message for Patient: Dr He   Does the patient know what this is regarding?: US results   Would the patient rather a call back or a response via [facility]?  Call back   Best Call Back Number:[phone]  Additional Information:

## 2024-10-28 ENCOUNTER — PATIENT MESSAGE (OUTPATIENT)
Dept: FAMILY MEDICINE | Facility: CLINIC | Age: 59
End: 2024-10-28
Payer: COMMERCIAL

## 2024-11-19 ENCOUNTER — ANESTHESIA (OUTPATIENT)
Dept: ENDOSCOPY | Facility: HOSPITAL | Age: 59
End: 2024-11-19
Payer: COMMERCIAL

## 2024-11-19 ENCOUNTER — ANESTHESIA EVENT (OUTPATIENT)
Dept: ENDOSCOPY | Facility: HOSPITAL | Age: 59
End: 2024-11-19
Payer: COMMERCIAL

## 2024-11-19 ENCOUNTER — HOSPITAL ENCOUNTER (OUTPATIENT)
Facility: HOSPITAL | Age: 59
Discharge: HOME | End: 2024-11-19
Attending: STUDENT IN AN ORGANIZED HEALTH CARE EDUCATION/TRAINING PROGRAM | Admitting: STUDENT IN AN ORGANIZED HEALTH CARE EDUCATION/TRAINING PROGRAM
Payer: COMMERCIAL

## 2024-11-19 VITALS
HEIGHT: 67 IN | WEIGHT: 293 LBS | SYSTOLIC BLOOD PRESSURE: 115 MMHG | RESPIRATION RATE: 18 BRPM | BODY MASS INDEX: 45.99 KG/M2 | HEART RATE: 72 BPM | TEMPERATURE: 97 F | DIASTOLIC BLOOD PRESSURE: 63 MMHG | OXYGEN SATURATION: 95 %

## 2024-11-19 DIAGNOSIS — Z12.11 COLON CANCER SCREENING: ICD-10-CM

## 2024-11-19 PROCEDURE — 27201012 HC FORCEPS, HOT/COLD, DISP: Performed by: STUDENT IN AN ORGANIZED HEALTH CARE EDUCATION/TRAINING PROGRAM

## 2024-11-19 PROCEDURE — 88305 TISSUE EXAM BY PATHOLOGIST: CPT | Mod: 26,,, | Performed by: PATHOLOGY

## 2024-11-19 PROCEDURE — 45380 COLONOSCOPY AND BIOPSY: CPT | Mod: 33,,, | Performed by: STUDENT IN AN ORGANIZED HEALTH CARE EDUCATION/TRAINING PROGRAM

## 2024-11-19 PROCEDURE — 88305 TISSUE EXAM BY PATHOLOGIST: CPT | Performed by: PATHOLOGY

## 2024-11-19 PROCEDURE — 45380 COLONOSCOPY AND BIOPSY: CPT | Mod: 33 | Performed by: STUDENT IN AN ORGANIZED HEALTH CARE EDUCATION/TRAINING PROGRAM

## 2024-11-19 PROCEDURE — 63600175 PHARM REV CODE 636 W HCPCS: Performed by: NURSE ANESTHETIST, CERTIFIED REGISTERED

## 2024-11-19 PROCEDURE — 37000008 HC ANESTHESIA 1ST 15 MINUTES: Performed by: STUDENT IN AN ORGANIZED HEALTH CARE EDUCATION/TRAINING PROGRAM

## 2024-11-19 PROCEDURE — 37000009 HC ANESTHESIA EA ADD 15 MINS: Performed by: STUDENT IN AN ORGANIZED HEALTH CARE EDUCATION/TRAINING PROGRAM

## 2024-11-19 RX ORDER — SODIUM CHLORIDE 9 MG/ML
INJECTION, SOLUTION INTRAVENOUS CONTINUOUS
Status: DISCONTINUED | OUTPATIENT
Start: 2024-11-19 | End: 2024-11-19 | Stop reason: HOSPADM

## 2024-11-19 RX ORDER — LIDOCAINE HYDROCHLORIDE 20 MG/ML
INJECTION INTRAVENOUS
Status: DISCONTINUED | OUTPATIENT
Start: 2024-11-19 | End: 2024-11-19

## 2024-11-19 RX ORDER — PROPOFOL 10 MG/ML
VIAL (ML) INTRAVENOUS CONTINUOUS PRN
Status: DISCONTINUED | OUTPATIENT
Start: 2024-11-19 | End: 2024-11-19

## 2024-11-19 RX ORDER — PROPOFOL 10 MG/ML
VIAL (ML) INTRAVENOUS
Status: DISCONTINUED | OUTPATIENT
Start: 2024-11-19 | End: 2024-11-19

## 2024-11-19 RX ORDER — GLUCAGON 1 MG
1 KIT INJECTION
Status: DISCONTINUED | OUTPATIENT
Start: 2024-11-19 | End: 2024-11-19 | Stop reason: HOSPADM

## 2024-11-19 RX ORDER — HALOPERIDOL 5 MG/ML
0.5 INJECTION INTRAMUSCULAR EVERY 10 MIN PRN
Status: DISCONTINUED | OUTPATIENT
Start: 2024-11-19 | End: 2024-11-19 | Stop reason: HOSPADM

## 2024-11-19 RX ADMIN — PROPOFOL 70 MG: 10 INJECTION, EMULSION INTRAVENOUS at 09:11

## 2024-11-19 RX ADMIN — PROPOFOL 125 MCG/KG/MIN: 10 INJECTION, EMULSION INTRAVENOUS at 09:11

## 2024-11-19 RX ADMIN — LIDOCAINE HYDROCHLORIDE 50 MG: 20 INJECTION INTRAVENOUS at 09:11

## 2024-11-19 NOTE — ANESTHESIA PREPROCEDURE EVALUATION
"[facility] [place]  Pre-operative Anesthetic Evaluation    Radha Mike is a 59 y.o. female here for Procedure(s) (LRB):  COLONOSCOPY (N/A)    Prev airway benign  Appr npo  No serious cardiopulm disease    PMHx:  Patient Active Problem List   Diagnosis    Hypothyroidism    Class 3 severe obesity due to excess calories with serious comorbidity and body mass index (BMI) of 45.0 to 49.9 in adult    Refusal of blood transfusions as patient is [employer]    Abnormal uterine bleeding (AUB)    Vitamin D deficiency    Impaired glucose tolerance    Class 3 severe obesity due to excess calories with serious comorbidity and body mass index (BMI) of 50.0 to 59.9 in adult    Weight loss counseling, encounter for    Chest pressure    Anemia, unspecified    Oral abscess    Sleep stage dysfunction    Excessive daytime sleepiness    CLEMENTINE (obstructive sleep apnea)       Echocardiogram (if on file):  No results found for this or any previous visit.      Allergies:  Review of patient's allergies indicates:   Allergen Reactions    Codeine Hives and Other (See Comments)     Other reaction(s): Hives  Stomach feels raw       Home Medications:  Current Outpatient Medications   Medication Instructions    gabapentin (NEURONTIN) 100 mg, Oral, 3 times daily PRN    ibuprofen 200 mg Cap Oral    SYNTHROID 150 mcg, Oral, Before breakfast       Surgical Hx:  Past Surgical History:   Procedure Laterality Date    ANKLE DEBRIDEMENT  2005    CHOLECYSTECTOMY  1988    D&C hysteroscopy  06/2017    TUBAL LIGATION  1996       Tobacco/EtOH:  Social History     Tobacco Use    Smoking status: Never    Smokeless tobacco: Never   Substance Use Topics    Alcohol use: Yes     Comment: occasional       Labs:  CBC: No results for input(s): "WBC", "RBC", "HGB", "HCT", "PLT", "MCV", "MCH", "MCHC" in the last 72 hours.    CMP: No results for input(s): "NA", "K", "CL", "CO2", "BUN", "CREATININE", "GLU", "MG", "PHOS", "CALCIUM", "ALBUMIN", "PROT", "ALKPHOS", " ""ALT", "AST", "BILITOT" in the last 72 hours.    Coags: No results for input(s): "PT", "INR", "PROTIME", "APTT" in the last 72 hours.    EKG:  Results for orders placed or performed in visit on 06/08/17   EKG 12-lead    Collection Time: 06/08/17  1:06 PM    Narrative    Test Reason : Z01.818  Blood Pressure : mmHG  Vent. Rate : 070 BPM     Atrial Rate : 070 BPM     P-R Int : 172 ms          QRS Dur : 096 ms      QT Int : 398 ms       P-R-T Axes : 063 020 040 degrees     QTc Int : 429 ms    Normal sinus rhythm with sinus arrhythmia  Cannot rule out Anterior infarct (cited on or before 03-MAR-2016)  Abnormal ECG  When compared with ECG of 03-MAR-2016 12:37,  No significant change was found  Confirmed by Too Estrada MD ([id number]) on 6/10/2017 9:46:00 AM    Referred By: JOSE DYER           Confirmed By:Too Estrada MD       Diagnostic Studies (pertinent only):        Pre-op Assessment    I have reviewed the Patient Summary Reports.     I have reviewed the Nursing Notes. I have reviewed the NPO Status.   I have reviewed the Medications.     Review of Systems  Anesthesia Hx:  No problems with previous Anesthesia   History of prior surgery of interest to airway management or planning:            Denies Personal Hx of Anesthesia complications.                    Social:  Non-Smoker       Hematology/Oncology:  Hematology Normal   Oncology Normal                                   Cardiovascular:  Exercise tolerance: poor    Denies Hypertension.    Denies CAD.    Denies CABG/stent.  Denies Dysrhythmias.   Denies Angina.  Denies CHF.       ECG has been reviewed.                            Pulmonary:        Sleep Apnea                Hepatic/GI:  Hepatic/GI Normal                    Neurological:    Denies CVA.    Denies Seizures.                                Endocrine:   Hypothyroidism        Morbid Obesity / BMI > 40      Physical Exam  General: Well nourished, Cooperative, Alert and Oriented  Morbid " obesity  Airway:  Mallampati: I / I  Mouth Opening: Normal  TM Distance: Normal  Tongue: Normal  Neck ROM: Normal ROM    Dental:  Dentures, Edentulous    Chest/Lungs:  Normal Respiratory Rate    Heart:  Rate: Normal        Anesthesia Plan  Type of Anesthesia, risks & benefits discussed:    Anesthesia Type: Gen ETT, Gen Natural Airway  Intra-op Monitoring Plan: Standard ASA Monitors  Post Op Pain Control Plan: multimodal analgesia and IV/PO Opioids PRN  Induction:  IV  Informed Consent: Informed consent signed with the Patient and all parties understand the risks and agree with anesthesia plan.  All questions answered.   ASA Score: 3  Day of Surgery Review of History & Physical: H&P Update referred to the surgeon/provider.    Ready For Surgery From Anesthesia Perspective.     .

## 2024-11-19 NOTE — ANESTHESIA POSTPROCEDURE EVALUATION
Anesthesia Post Evaluation    Patient: Radha Mike    Procedure(s) Performed: Procedure(s) (LRB):  COLONOSCOPY (N/A)    Final Anesthesia Type: general      Patient location during evaluation: [facility]  Patient participation: Yes- Able to Participate  Level of consciousness: awake and alert and oriented  Post-procedure vital signs: reviewed and stable  Pain management: adequate  Airway patency: patent  CLEMENTINE mitigation strategies: Multimodal analgesia  PONV status at discharge: No PONV  Anesthetic complications: no      Cardiovascular status: blood pressure returned to baseline and hemodynamically stable  Respiratory status: unassisted, spontaneous ventilation and room air  Hydration status: euvolemic  Follow-up not needed.              Vitals Value Taken Time   /63 11/19/24 0947   Temp 36.3 °C (97.3 °F) 11/19/24 0927   Pulse 77 11/19/24 0950   Resp 15 11/19/24 0950   SpO2 95 % 11/19/24 0950   Vitals shown include unfiled device data.      No case tracking events are documented in the log.      Pain/Zheng Score: Zheng Score: 10 (11/19/2024  9:45 AM)

## 2024-11-19 NOTE — TRANSFER OF CARE
"Anesthesia Transfer of Care Note    Patient: Radha Mike    Procedure(s) Performed: Procedure(s) (LRB):  COLONOSCOPY (N/A)    Patient location: [facility]    Anesthesia Type: general    Transport from OR: Transported from OR on room air with adequate spontaneous ventilation    Post pain: adequate analgesia    Post assessment: no apparent anesthetic complications and tolerated procedure well    Post vital signs: stable    Level of consciousness: awake, alert and oriented    Nausea/Vomiting: no nausea/vomiting    Complications: none    Transfer of care protocol was followed    Last vitals: Visit Vitals  BP (!) 124/57   Pulse 96   Temp 36.3 °C (97.3 °F) (Skin)   Resp 12   Ht 5' 7" (1.702 m)   Wt (!) 168.7 kg (372 lb)   LMP 05/25/2017 (Approximate)   SpO2 97%   Breastfeeding No   BMI 58.26 kg/m²     "

## 2024-11-19 NOTE — PROGRESS NOTES
Discharge instructions reviewed w/pt and , verbalized understanding. Pt in NADN.No complaints at this time. To be d/c'd home w/ .

## 2024-11-19 NOTE — PROVATION PATIENT INSTRUCTIONS
Discharge Summary/Instructions after an Endoscopic Procedure  Patient Name: Radha Mike  Patient MRN: [id number]  Patient YOB: 1965 Tuesday, November 19, 2024  Benigno Rosas MD  Dear patient,  As a result of recent federal legislation (The Federal Cures Act), you may   receive lab or pathology results from your procedure in your [facility]   account before your physician is able to contact you. Your physician or   their representative will relay the results to you with their   recommendations at their soonest availability.  Thank you,  RESTRICTIONS:  During your procedure today, you received medications for sedation.  These   medications may affect your judgment, balance and coordination.  Therefore,   for 24 hours, you have the following restrictions:   - DO NOT drive a car, operate machinery, make legal/financial decisions,   sign important papers or drink alcohol.    ACTIVITY:  Today: no heavy lifting, straining or running due to procedural   sedation/anesthesia.  The following day: return to full activity including work.  DIET:  Eat and drink normally unless instructed otherwise.     TREATMENT FOR COMMON SIDE EFFECTS:  - Mild abdominal pain, nausea, belching, bloating or excessive gas:  rest,   eat lightly and use a heating pad.  - Sore Throat: treat with throat lozenges and/or gargle with warm salt   water.  - Because air was used during the procedure, expelling large amounts of air   from your rectum or belching is normal.  - If a bowel prep was taken, you may not have a bowel movement for 1-3 days.    This is normal.  SYMPTOMS TO WATCH FOR AND REPORT TO YOUR PHYSICIAN:  1. Abdominal pain or bloating, other than gas cramps.  2. Chest pain.  3. Back pain.  4. Signs of infection such as: chills or fever occurring within 24 hours   after the procedure.  5. Rectal bleeding, which would show as bright red, maroon, or black stools.   (A tablespoon of blood from the rectum is not serious,  especially if   hemorrhoids are present.)  6. Vomiting.  7. Weakness or dizziness.  GO DIRECTLY TO THE NEAREST EMERGENCY ROOM IF YOU HAVE ANY OF THE FOLLOWING:      Difficulty breathing              Chills and/or fever over 101 F   Persistent vomiting and/or vomiting blood   Severe abdominal pain   Severe chest pain   Black, tarry stools   Bleeding- more than one tablespoon   Any other symptom or condition that you feel may need urgent attention  Your doctor recommends these additional instructions:  If any biopsies were taken, your doctors clinic will contact you in 1 to 2   weeks with any results.  - Patient has a contact number available for emergencies.  The signs and   symptoms of potential delayed complications were discussed with the   patient.  Return to normal activities tomorrow.  Written discharge   instructions were provided to the patient.   - Discharge patient to home.   - Resume previous diet.   - Continue present medications.   - Await pathology results.   - Repeat colonoscopy in 7 years for surveillance.  For questions, problems or results please call your physician - Benigno Rosas MD at Work:  [phone].  [facility], EMERGENCY ROOM PHONE NUMBER: [phone]  IF A COMPLICATION OR EMERGENCY SITUATION ARISES AND YOU ARE UNABLE TO REACH   YOUR PHYSICIAN - GO DIRECTLY TO THE EMERGENCY ROOM.  Benigno Rosas MD  11/19/2024 9:42:41 AM  This report has been verified and signed electronically.  Dear patient,  As a result of recent federal legislation (The Federal Cures Act), you may   receive lab or pathology results from your procedure in your [facility]   account before your physician is able to contact you. Your physician or   their representative will relay the results to you with their   recommendations at their soonest availability.  Thank you,  PROVATION

## 2024-11-19 NOTE — H&P
Short Stay Endoscopy History and Physical    PCP - Jose Brito MD    Procedure - Colonoscopy  ASA - per anesthesia  Mallampati - per anesthesia  History of Anesthesia problems - no  Family history Anesthesia problems -  no   Plan of anesthesia - General    HPI:  This is a 59 y.o. female here for evaluation of : screening colonoscopy.    ROS:  Constitutional: No fevers, chills  CV: No chest pain  Pulm: No shortness of breath  GI: see HPI  Derm: No rash    Medical History:  has a past medical history of Anemia, Arthritis, Psoriasis, and Thyroid disease.    Surgical History:  has a past surgical history that includes Cholecystectomy (1988); Ankle debridement (2005); Tubal ligation (1996); and D&C hysteroscopy (06/2017).    Family History: family history includes Arthritis in her sister; Breast cancer in her mother; Cancer in her father; Diabetes in her father, mother, and sister; Heart attack in her mother; Heart disease in her mother; Hypothyroidism in her son; No Known Problems in her brother and daughter; Stomach cancer in her father.. Otherwise no colon cancer, inflammatory bowel disease, or GI malignancies.    Social History:  reports that she has never smoked. She has never used smokeless tobacco. She reports current alcohol use. She reports that she does not use drugs.    Review of patient's allergies indicates:   Allergen Reactions    Codeine Hives and Other (See Comments)     Other reaction(s): Hives  Stomach feels raw       Medications:   Medications Prior to Admission   Medication Sig Dispense Refill Last Dose/Taking    gabapentin (NEURONTIN) 100 MG capsule Take 1 capsule (100 mg total) by mouth 3 (three) times daily as needed. 90 capsule 11 11/18/2024    ibuprofen 200 mg Cap Take by mouth.   11/18/2024    SYNTHROID 150 mcg tablet Take 1 tablet (150 mcg total) by mouth before breakfast. 90 tablet 3 11/18/2024         Physical Exam:    Vital Signs:   Vitals:    11/19/24 0818   BP: 138/76   Pulse: 80    Resp: 17   Temp: 97.7 °F (36.5 °C)       General Appearance: Well appearing in no acute distress  Abdomen: non distended     Labs:  Lab Results   Component Value Date    WBC 9.59 06/13/2024    HGB 12.2 06/13/2024    HCT 40.8 06/13/2024     06/13/2024    CHOL 132 06/13/2024    CHOL 132 06/13/2024    TRIG 48 06/13/2024    TRIG 48 06/13/2024    HDL 53 06/13/2024    HDL 53 06/13/2024    ALT 13 06/13/2024    ALT 13 06/13/2024    AST 22 06/13/2024    AST 22 06/13/2024     06/13/2024     06/13/2024    K 4.7 06/13/2024    K 4.7 06/13/2024     06/13/2024     06/13/2024    CREATININE 0.80 06/13/2024    CREATININE 0.80 06/13/2024    BUN 11 06/13/2024    BUN 11 06/13/2024    CO2 29 06/13/2024    CO2 29 06/13/2024    TSH 0.054 (L) 06/13/2024    TSH 0.054 (L) 06/13/2024    HGBA1C 6.0 (H) 06/13/2024    HGBA1C 6.0 (H) 06/13/2024       I have explained the risks and benefits of endoscopy procedures to the patient including but not limited to bleeding, perforation, infection, and death.  The patient was asked if they understand and allowed to ask any further questions to their satisfaction.    Fela Leal MD

## 2024-11-21 LAB
FINAL PATHOLOGIC DIAGNOSIS: NORMAL
GROSS: NORMAL
Lab: NORMAL

## 2024-11-22 ENCOUNTER — TELEPHONE (OUTPATIENT)
Dept: GASTROENTEROLOGY | Facility: HOSPITAL | Age: 59
End: 2024-11-22
Payer: COMMERCIAL

## 2024-12-03 ENCOUNTER — OFFICE VISIT (OUTPATIENT)
Dept: OBSTETRICS AND GYNECOLOGY | Facility: CLINIC | Age: 59
End: 2024-12-03
Payer: COMMERCIAL

## 2024-12-03 VITALS
SYSTOLIC BLOOD PRESSURE: 105 MMHG | HEIGHT: 67 IN | DIASTOLIC BLOOD PRESSURE: 68 MMHG | BODY MASS INDEX: 45.99 KG/M2 | WEIGHT: 293 LBS

## 2024-12-03 DIAGNOSIS — R93.89 THICKENED ENDOMETRIUM: Primary | ICD-10-CM

## 2024-12-03 DIAGNOSIS — Z01.818 PRE-OP EXAM: ICD-10-CM

## 2024-12-03 PROCEDURE — 99999 PR PBB SHADOW E&M-EST. PATIENT-LVL III: CPT | Mod: PBBFAC,,, | Performed by: OBSTETRICS & GYNECOLOGY

## 2024-12-03 RX ORDER — SODIUM CHLORIDE, SODIUM LACTATE, POTASSIUM CHLORIDE, CALCIUM CHLORIDE 600; 310; 30; 20 MG/100ML; MG/100ML; MG/100ML; MG/100ML
INJECTION, SOLUTION INTRAVENOUS CONTINUOUS
Status: CANCELLED | OUTPATIENT
Start: 2024-12-03

## 2024-12-03 NOTE — H&P
CC: Pre-op    HPI: 59 y.o. female patient presents for D&C hysteroscope  surgery.    MEDICATIONS: MEDICATION LIST    Current Medications   Current Outpatient Medications:     SYNTHROID 150 mcg tablet, Take 1 tablet (150 mcg total) by mouth before breakfast., Disp: 90 tablet, Rfl: 3        ALLERGIES: ALLERGY/ADVERSE REACTION LIST  Codeine     HISTORY:     PAST MEDICAL HISTORY:        Active Ambulatory Problems     Diagnosis Date Noted    Hypothyroidism 06/12/2014    Class 3 severe obesity due to excess calories with serious comorbidity and body mass index (BMI) of 45.0 to 49.9 in adult 06/12/2014    Refusal of blood transfusions as patient is [employer] 06/08/2017    Abnormal uterine bleeding (AUB) 06/12/2017    Vitamin D deficiency 07/02/2020    Impaired glucose tolerance 07/02/2020    Class 3 severe obesity due to excess calories with serious comorbidity and body mass index (BMI) of 50.0 to 59.9 in adult 09/08/2020    Weight loss counseling, encounter for 09/08/2020    Chest pressure 02/10/2021    Anemia, unspecified 02/17/2021    Oral abscess 05/13/2021    Sleep stage dysfunction 07/08/2024    Excessive daytime sleepiness 07/08/2024    CLEEMNTINE (obstructive sleep apnea) 07/08/2024           Resolved Ambulatory Problems     Diagnosis Date Noted    Special screening for malignant neoplasms, colon 10/10/2014           Past Medical History:   Diagnosis Date    Anemia      Arthritis      Psoriasis      Thyroid disease             PAST SURGICAL HISTORY:         Past Surgical History:   Procedure Laterality Date    ANKLE DEBRIDEMENT   2005    CHOLECYSTECTOMY   1988    COLONOSCOPY N/A 11/19/2024     Procedure: COLONOSCOPY;  Surgeon: Benigno Rosas MD;  Location: [address]);  Service: Endoscopy;  Laterality: N/A;  8/20  BMI: 58.26  Referral: Jose Brito MD  [facility] portal and mailed  LW  11/11: precall complete EB  11/12-emailed instr to [email or web address]    D&C hysteroscopy   06/2017     "TUBAL LIGATION   1996        FAMILY HISTORY: family history includes Arthritis in her sister; Breast cancer in her mother; Cancer in her father; Diabetes in her father, mother, and sister; Heart attack in her mother; Heart disease in her mother; Hypothyroidism in her son; No Known Problems in her brother and daughter; Stomach cancer in her father.     SOCIAL HISTORY:   Social History            Tobacco Use    Smoking status: Never    Smokeless tobacco: Never   Substance Use Topics    Alcohol use: Yes       Comment: occasional        Data Reviewed:     VITAL SIGNS:   Vitals       Vitals:     12/03/24 1045   BP: 105/68   Weight: (!) 171.5 kg (378 lb 1.4 oz)   Height: 5' 7" (1.702 m)           ROS:       Negative other than HPI        PE:   APPEARANCE: Well nourished, well developed, in no acute distress.  CHEST: Lungs clear to auscultation.  HEART: Regular rate and rhythm, no murmurs, rubs or gallops.  PELVIC: Deferred.      ASSESSMENT:  1.   1. Thickened endometrium    2. Pre-op exam           PLAN FOR SURGERY:  The risks, benefits and alternatives of the various treatment options have been discussed and the patient has decided to proceed with surgical intervention. We discussed the various risk associated with gynecologic surgical procedures, including but not limited to, infection, bleeding, anesthetic complications, venous thromboembolism, and cardiovascular events. We discussed the possible need for blood transfusion and the risk of associated complications, including blood born infections. We discussed the risk of major vessel injury, gastrointestinal, urologic, and neurologic complications. We discussed the risk of bowel injury. The expected post-operative course was discussed and all the patient's questions were answered.   The procedure consent forms and blood transfusion consent forms signed, scanned into chart  Preop labs ordered as appropriate.   Patient counseled NPO after midnight day prior to surgery " and preop will call patient for additional instructions   Surgery scheduled for  12/6/24.

## 2024-12-03 NOTE — PROGRESS NOTES
CC: Pre-op    HPI: 59 y.o. female patient presents for D&C hysteroscope  surgery.    MEDICATIONS: MEDICATION LIST    Current Outpatient Medications:     SYNTHROID 150 mcg tablet, Take 1 tablet (150 mcg total) by mouth before breakfast., Disp: 90 tablet, Rfl: 3     ALLERGIES: ALLERGY/ADVERSE REACTION LIST  Codeine     HISTORY:     PAST MEDICAL HISTORY:   Active Ambulatory Problems     Diagnosis Date Noted    Hypothyroidism 06/12/2014    Class 3 severe obesity due to excess calories with serious comorbidity and body mass index (BMI) of 45.0 to 49.9 in adult 06/12/2014    Refusal of blood transfusions as patient is [employer] 06/08/2017    Abnormal uterine bleeding (AUB) 06/12/2017    Vitamin D deficiency 07/02/2020    Impaired glucose tolerance 07/02/2020    Class 3 severe obesity due to excess calories with serious comorbidity and body mass index (BMI) of 50.0 to 59.9 in adult 09/08/2020    Weight loss counseling, encounter for 09/08/2020    Chest pressure 02/10/2021    Anemia, unspecified 02/17/2021    Oral abscess 05/13/2021    Sleep stage dysfunction 07/08/2024    Excessive daytime sleepiness 07/08/2024    CLEMENTINE (obstructive sleep apnea) 07/08/2024     Resolved Ambulatory Problems     Diagnosis Date Noted    Special screening for malignant neoplasms, colon 10/10/2014     Past Medical History:   Diagnosis Date    Anemia     Arthritis     Psoriasis     Thyroid disease           PAST SURGICAL HISTORY:   Past Surgical History:   Procedure Laterality Date    ANKLE DEBRIDEMENT  2005    CHOLECYSTECTOMY  1988    COLONOSCOPY N/A 11/19/2024    Procedure: COLONOSCOPY;  Surgeon: Benigno Rosas MD;  Location: [address]);  Service: Endoscopy;  Laterality: N/A;  8/20  BMI: 58.26  Referral: Jose Brito MD  [facility] portal and mailed  LW  11/11: precall complete EB  11/12-emailed instr to [email or web address]    D&C hysteroscopy  06/2017    TUBAL LIGATION  1996        FAMILY HISTORY: family history  "includes Arthritis in her sister; Breast cancer in her mother; Cancer in her father; Diabetes in her father, mother, and sister; Heart attack in her mother; Heart disease in her mother; Hypothyroidism in her son; No Known Problems in her brother and daughter; Stomach cancer in her father.     SOCIAL HISTORY:   Social History     Tobacco Use    Smoking status: Never    Smokeless tobacco: Never   Substance Use Topics    Alcohol use: Yes     Comment: occasional        Data Reviewed:     VITAL SIGNS:   Vitals:    12/03/24 1045   BP: 105/68   Weight: (!) 171.5 kg (378 lb 1.4 oz)   Height: 5' 7" (1.702 m)        ROS:      Negative other than HPI       PE:   APPEARANCE: Well nourished, well developed, in no acute distress.  CHEST: Lungs clear to auscultation.  HEART: Regular rate and rhythm, no murmurs, rubs or gallops.  PELVIC: Deferred.      ASSESSMENT:  1.   1. Thickened endometrium    2. Pre-op exam           PLAN FOR SURGERY:  The risks, benefits and alternatives of the various treatment options have been discussed and the patient has decided to proceed with surgical intervention. We discussed the various risk associated with gynecologic surgical procedures, including but not limited to, infection, bleeding, anesthetic complications, venous thromboembolism, and cardiovascular events. We discussed the possible need for blood transfusion and the risk of associated complications, including blood born infections. We discussed the risk of major vessel injury, gastrointestinal, urologic, and neurologic complications. We discussed the risk of bowel injury. The expected post-operative course was discussed and all the patient's questions were answered.   The procedure consent forms and blood transfusion consent forms signed, scanned into chart  Preop labs ordered as appropriate.   Patient counseled NPO after midnight day prior to surgery and preop will call patient for additional instructions   Surgery scheduled for  " 12/6/24.

## 2024-12-03 NOTE — H&P (VIEW-ONLY)
CC: Pre-op    HPI: 59 y.o. female patient presents for D&C hysteroscope  surgery.    MEDICATIONS: MEDICATION LIST    Current Medications   Current Outpatient Medications:     SYNTHROID 150 mcg tablet, Take 1 tablet (150 mcg total) by mouth before breakfast., Disp: 90 tablet, Rfl: 3        ALLERGIES: ALLERGY/ADVERSE REACTION LIST  Codeine     HISTORY:     PAST MEDICAL HISTORY:        Active Ambulatory Problems     Diagnosis Date Noted    Hypothyroidism 06/12/2014    Class 3 severe obesity due to excess calories with serious comorbidity and body mass index (BMI) of 45.0 to 49.9 in adult 06/12/2014    Refusal of blood transfusions as patient is Alevism 06/08/2017    Abnormal uterine bleeding (AUB) 06/12/2017    Vitamin D deficiency 07/02/2020    Impaired glucose tolerance 07/02/2020    Class 3 severe obesity due to excess calories with serious comorbidity and body mass index (BMI) of 50.0 to 59.9 in adult 09/08/2020    Weight loss counseling, encounter for 09/08/2020    Chest pressure 02/10/2021    Anemia, unspecified 02/17/2021    Oral abscess 05/13/2021    Sleep stage dysfunction 07/08/2024    Excessive daytime sleepiness 07/08/2024    CLEMENTINE (obstructive sleep apnea) 07/08/2024           Resolved Ambulatory Problems     Diagnosis Date Noted    Special screening for malignant neoplasms, colon 10/10/2014           Past Medical History:   Diagnosis Date    Anemia      Arthritis      Psoriasis      Thyroid disease             PAST SURGICAL HISTORY:         Past Surgical History:   Procedure Laterality Date    ANKLE DEBRIDEMENT   2005    CHOLECYSTECTOMY   1988    COLONOSCOPY N/A 11/19/2024     Procedure: COLONOSCOPY;  Surgeon: Benigno Rosas MD;  Location: [address]);  Service: Endoscopy;  Laterality: N/A;  8/20  BMI: 58.26  Referral: Jose Brito MD  [facility] portal and mailed  LW  11/11: precall complete EB  11/12-emailed instr to [email or web address]    D&C hysteroscopy   06/2017     "TUBAL LIGATION   1996        FAMILY HISTORY: family history includes Arthritis in her sister; Breast cancer in her mother; Cancer in her father; Diabetes in her father, mother, and sister; Heart attack in her mother; Heart disease in her mother; Hypothyroidism in her son; No Known Problems in her brother and daughter; Stomach cancer in her father.     SOCIAL HISTORY:   Social History            Tobacco Use    Smoking status: Never    Smokeless tobacco: Never   Substance Use Topics    Alcohol use: Yes       Comment: occasional        Data Reviewed:     VITAL SIGNS:   Vitals       Vitals:     12/03/24 1045   BP: 105/68   Weight: (!) 171.5 kg (378 lb 1.4 oz)   Height: 5' 7" (1.702 m)           ROS:       Negative other than HPI        PE:   APPEARANCE: Well nourished, well developed, in no acute distress.  CHEST: Lungs clear to auscultation.  HEART: Regular rate and rhythm, no murmurs, rubs or gallops.  PELVIC: Deferred.      ASSESSMENT:  1.   1. Thickened endometrium    2. Pre-op exam           PLAN FOR SURGERY:  The risks, benefits and alternatives of the various treatment options have been discussed and the patient has decided to proceed with surgical intervention. We discussed the various risk associated with gynecologic surgical procedures, including but not limited to, infection, bleeding, anesthetic complications, venous thromboembolism, and cardiovascular events. We discussed the possible need for blood transfusion and the risk of associated complications, including blood born infections. We discussed the risk of major vessel injury, gastrointestinal, urologic, and neurologic complications. We discussed the risk of bowel injury. The expected post-operative course was discussed and all the patient's questions were answered.   The procedure consent forms and blood transfusion consent forms signed, scanned into chart  Preop labs ordered as appropriate.   Patient counseled NPO after midnight day prior to surgery " and preop will call patient for additional instructions   Surgery scheduled for  12/6/24.

## 2024-12-06 ENCOUNTER — HOSPITAL ENCOUNTER (OUTPATIENT)
Facility: HOSPITAL | Age: 59
Discharge: HOME | End: 2024-12-06
Attending: OBSTETRICS & GYNECOLOGY | Admitting: OBSTETRICS & GYNECOLOGY
Payer: COMMERCIAL

## 2024-12-06 ENCOUNTER — ANESTHESIA EVENT (OUTPATIENT)
Dept: SURGERY | Facility: HOSPITAL | Age: 59
End: 2024-12-06
Payer: COMMERCIAL

## 2024-12-06 ENCOUNTER — ANESTHESIA (OUTPATIENT)
Dept: SURGERY | Facility: HOSPITAL | Age: 59
End: 2024-12-06
Payer: COMMERCIAL

## 2024-12-06 VITALS
DIASTOLIC BLOOD PRESSURE: 73 MMHG | SYSTOLIC BLOOD PRESSURE: 137 MMHG | WEIGHT: 293 LBS | OXYGEN SATURATION: 96 % | HEIGHT: 67 IN | BODY MASS INDEX: 45.99 KG/M2 | HEART RATE: 74 BPM | RESPIRATION RATE: 18 BRPM | TEMPERATURE: 99 F

## 2024-12-06 DIAGNOSIS — R52 ACUTE PAIN: Primary | ICD-10-CM

## 2024-12-06 DIAGNOSIS — R93.89 THICKENED ENDOMETRIUM: ICD-10-CM

## 2024-12-06 PROCEDURE — 37000008 HC ANESTHESIA 1ST 15 MINUTES: Performed by: OBSTETRICS & GYNECOLOGY

## 2024-12-06 PROCEDURE — 88305 TISSUE EXAM BY PATHOLOGIST: CPT | Performed by: PATHOLOGY

## 2024-12-06 PROCEDURE — 36000706: Performed by: OBSTETRICS & GYNECOLOGY

## 2024-12-06 PROCEDURE — 63600175 PHARM REV CODE 636 W HCPCS: Performed by: STUDENT IN AN ORGANIZED HEALTH CARE EDUCATION/TRAINING PROGRAM

## 2024-12-06 PROCEDURE — 63600175 PHARM REV CODE 636 W HCPCS: Performed by: NURSE ANESTHETIST, CERTIFIED REGISTERED

## 2024-12-06 PROCEDURE — 25000003 PHARM REV CODE 250: Performed by: OBSTETRICS & GYNECOLOGY

## 2024-12-06 PROCEDURE — 37000009 HC ANESTHESIA EA ADD 15 MINS: Performed by: OBSTETRICS & GYNECOLOGY

## 2024-12-06 PROCEDURE — 71000033 HC RECOVERY, INTIAL HOUR: Performed by: OBSTETRICS & GYNECOLOGY

## 2024-12-06 PROCEDURE — 27201423 OPTIME MED/SURG SUP & DEVICES STERILE SUPPLY: Performed by: OBSTETRICS & GYNECOLOGY

## 2024-12-06 PROCEDURE — 71000015 HC POSTOP RECOV 1ST HR: Performed by: OBSTETRICS & GYNECOLOGY

## 2024-12-06 PROCEDURE — 25000003 PHARM REV CODE 250: Performed by: NURSE ANESTHETIST, CERTIFIED REGISTERED

## 2024-12-06 PROCEDURE — 71000016 HC POSTOP RECOV ADDL HR: Performed by: OBSTETRICS & GYNECOLOGY

## 2024-12-06 PROCEDURE — C1782 MORCELLATOR: HCPCS | Performed by: OBSTETRICS & GYNECOLOGY

## 2024-12-06 PROCEDURE — 36000707: Performed by: OBSTETRICS & GYNECOLOGY

## 2024-12-06 PROCEDURE — 58558 HYSTEROSCOPY BIOPSY: CPT | Mod: ,,, | Performed by: OBSTETRICS & GYNECOLOGY

## 2024-12-06 RX ORDER — OXYCODONE HYDROCHLORIDE 5 MG/1
5 TABLET ORAL
Status: DISCONTINUED | OUTPATIENT
Start: 2024-12-06 | End: 2024-12-06 | Stop reason: HOSPADM

## 2024-12-06 RX ORDER — SUCCINYLCHOLINE CHLORIDE 20 MG/ML
INJECTION INTRAMUSCULAR; INTRAVENOUS
Status: DISCONTINUED | OUTPATIENT
Start: 2024-12-06 | End: 2024-12-06

## 2024-12-06 RX ORDER — FENTANYL CITRATE 50 UG/ML
INJECTION, SOLUTION INTRAMUSCULAR; INTRAVENOUS
Status: DISCONTINUED | OUTPATIENT
Start: 2024-12-06 | End: 2024-12-06

## 2024-12-06 RX ORDER — ONDANSETRON HYDROCHLORIDE 2 MG/ML
4 INJECTION, SOLUTION INTRAVENOUS DAILY PRN
Status: DISCONTINUED | OUTPATIENT
Start: 2024-12-06 | End: 2024-12-06 | Stop reason: HOSPADM

## 2024-12-06 RX ORDER — OXYCODONE HYDROCHLORIDE 5 MG/1
10 TABLET ORAL EVERY 4 HOURS PRN
Status: DISCONTINUED | OUTPATIENT
Start: 2024-12-06 | End: 2024-12-06 | Stop reason: HOSPADM

## 2024-12-06 RX ORDER — PROCHLORPERAZINE EDISYLATE 5 MG/ML
5 INJECTION INTRAMUSCULAR; INTRAVENOUS EVERY 30 MIN PRN
Status: DISCONTINUED | OUTPATIENT
Start: 2024-12-06 | End: 2024-12-06 | Stop reason: HOSPADM

## 2024-12-06 RX ORDER — HYDROMORPHONE HYDROCHLORIDE 2 MG/ML
0.5 INJECTION, SOLUTION INTRAMUSCULAR; INTRAVENOUS; SUBCUTANEOUS EVERY 5 MIN PRN
Status: DISCONTINUED | OUTPATIENT
Start: 2024-12-06 | End: 2024-12-06 | Stop reason: HOSPADM

## 2024-12-06 RX ORDER — ONDANSETRON HYDROCHLORIDE 2 MG/ML
INJECTION, SOLUTION INTRAVENOUS
Status: DISCONTINUED | OUTPATIENT
Start: 2024-12-06 | End: 2024-12-06

## 2024-12-06 RX ORDER — ACETAMINOPHEN 10 MG/ML
INJECTION, SOLUTION INTRAVENOUS
Status: DISCONTINUED | OUTPATIENT
Start: 2024-12-06 | End: 2024-12-06

## 2024-12-06 RX ORDER — KETOROLAC TROMETHAMINE 30 MG/ML
INJECTION, SOLUTION INTRAMUSCULAR; INTRAVENOUS
Status: DISCONTINUED | OUTPATIENT
Start: 2024-12-06 | End: 2024-12-06

## 2024-12-06 RX ORDER — SODIUM CHLORIDE, SODIUM LACTATE, POTASSIUM CHLORIDE, CALCIUM CHLORIDE 600; 310; 30; 20 MG/100ML; MG/100ML; MG/100ML; MG/100ML
INJECTION, SOLUTION INTRAVENOUS CONTINUOUS
Status: DISCONTINUED | OUTPATIENT
Start: 2024-12-06 | End: 2024-12-06 | Stop reason: HOSPADM

## 2024-12-06 RX ORDER — HYDROCODONE BITARTRATE AND ACETAMINOPHEN 5; 325 MG/1; MG/1
1 TABLET ORAL EVERY 4 HOURS PRN
Qty: 5 TABLET | Refills: 0 | Status: SHIPPED | OUTPATIENT
Start: 2024-12-06

## 2024-12-06 RX ORDER — LIDOCAINE HYDROCHLORIDE 20 MG/ML
INJECTION INTRAVENOUS
Status: DISCONTINUED | OUTPATIENT
Start: 2024-12-06 | End: 2024-12-06

## 2024-12-06 RX ORDER — OXYCODONE HYDROCHLORIDE 5 MG/1
5 TABLET ORAL EVERY 4 HOURS PRN
Status: DISCONTINUED | OUTPATIENT
Start: 2024-12-06 | End: 2024-12-06 | Stop reason: HOSPADM

## 2024-12-06 RX ORDER — DIPHENHYDRAMINE HCL 25 MG
25 CAPSULE ORAL EVERY 4 HOURS PRN
Status: DISCONTINUED | OUTPATIENT
Start: 2024-12-06 | End: 2024-12-06 | Stop reason: HOSPADM

## 2024-12-06 RX ORDER — DEXAMETHASONE SODIUM PHOSPHATE 4 MG/ML
INJECTION, SOLUTION INTRA-ARTICULAR; INTRALESIONAL; INTRAMUSCULAR; INTRAVENOUS; SOFT TISSUE
Status: DISCONTINUED | OUTPATIENT
Start: 2024-12-06 | End: 2024-12-06

## 2024-12-06 RX ORDER — ONDANSETRON 8 MG/1
8 TABLET, ORALLY DISINTEGRATING ORAL EVERY 8 HOURS PRN
Status: DISCONTINUED | OUTPATIENT
Start: 2024-12-06 | End: 2024-12-06 | Stop reason: HOSPADM

## 2024-12-06 RX ORDER — PROPOFOL 10 MG/ML
VIAL (ML) INTRAVENOUS
Status: DISCONTINUED | OUTPATIENT
Start: 2024-12-06 | End: 2024-12-06

## 2024-12-06 RX ADMIN — FENTANYL CITRATE 100 MCG: 0.05 INJECTION, SOLUTION INTRAMUSCULAR; INTRAVENOUS at 07:12

## 2024-12-06 RX ADMIN — SUCCINYLCHOLINE CHLORIDE 200 MG: 20 INJECTION, SOLUTION INTRAMUSCULAR; INTRAVENOUS at 07:12

## 2024-12-06 RX ADMIN — SODIUM CHLORIDE: 0.9 INJECTION, SOLUTION INTRAVENOUS at 07:12

## 2024-12-06 RX ADMIN — PROPOFOL 200 MG: 10 INJECTION, EMULSION INTRAVENOUS at 07:12

## 2024-12-06 RX ADMIN — LIDOCAINE HYDROCHLORIDE 100 MG: 20 INJECTION, SOLUTION INTRAVENOUS at 07:12

## 2024-12-06 RX ADMIN — KETOROLAC TROMETHAMINE 15 MG: 30 INJECTION, SOLUTION INTRAMUSCULAR; INTRAVENOUS at 07:12

## 2024-12-06 RX ADMIN — ONDANSETRON 4 MG: 2 INJECTION, SOLUTION INTRAMUSCULAR; INTRAVENOUS at 07:12

## 2024-12-06 RX ADMIN — DEXAMETHASONE SODIUM PHOSPHATE 8 MG: 4 INJECTION, SOLUTION INTRA-ARTICULAR; INTRALESIONAL; INTRAMUSCULAR; INTRAVENOUS; SOFT TISSUE at 07:12

## 2024-12-06 RX ADMIN — ACETAMINOPHEN 1000 MG: 10 INJECTION, SOLUTION INTRAVENOUS at 07:12

## 2024-12-06 RX ADMIN — GLYCOPYRROLATE 0.2 MG: 0.2 INJECTION, SOLUTION INTRAMUSCULAR; INTRAVITREAL at 07:12

## 2024-12-06 RX ADMIN — OXYCODONE 10 MG: 5 TABLET ORAL at 08:12

## 2024-12-06 NOTE — ANESTHESIA PROCEDURE NOTES
Intubation    Date/Time: 12/6/2024 7:14 AM    Performed by: Wayne Alamo Jr., CRNA  Authorized by: Harry Llanos MD    Intubation:     Induction:  Intravenous    Intubated:  Postinduction    Mask Ventilation:  Easy with oral airway    Attempts:  1    Attempted By:  CRNA    Method of Intubation:  Video laryngoscopy    Blade:  Morton 3    Laryngeal View Grade: Grade I - full view of cords      Difficult Airway Encountered?: No      Complications:  None    Airway Device:  Oral endotracheal tube    Airway Device Size:  7.5    Style/Cuff Inflation:  Cuffed (inflated to minimal occlusive pressure)    Tube secured:  21    Secured at:  The lips    Placement Verified By:  Capnometry    Complicating Factors:  Obesity and short neck    Findings Post-Intubation:  BS equal bilateral and atraumatic/condition of teeth unchanged

## 2024-12-06 NOTE — DISCHARGE SUMMARY
Admit Date: 12/6/24    Discharge Date:Same    Attending Physician: GRIFFIN He MD    Procedures: D&C hysteroscope and Myosure shaving    Admit Diagnosis:  bleeing  Discharge Diagnosis:    Bleeding  Endometrial polyp  Bicornuate uterus      Hospital course: Afebrile and vital signs stable throughout course. Routine progressive care.  No nausea/vomiting.    Discharged Condition: Improving    Disposition: Discharge to home or self care    Patient Instructions:   Pelvic rest x 4-6 weeks  Follow up if desired  No heavy lifting  Call for excessive vaginal bleeding, temperature greater than 100.6, redness or increasing pain to incision.  Discharge Medications: Given to patient or in chart     Diet: Regular    Luly He MD  Obstetrics & Gynecology  [facility]

## 2024-12-06 NOTE — OP NOTE
DATE OF PROCEDURE:  12/6/24     PREOPERATIVE DIAGNOSIS:     Bleeding     POSTOPERATIVE DIAGNOSIS:     Bleeding  Endometrial polyp  Bicornuate uterus     SURGERY:  D and C hysteroscope. Myosure polypectomy     SURGEON:  Luly He M.D.     ASSISTANT:  SCOTT Hoffman LPN.     ANESTHESIA:  LMA     ESTIMATED BLOOD LOSS: 10 mL.     FINDINGS:  Uterus sounded to 8 cm and the patient does not pull well for LAVH if needed   in the future. Large polyp or fibroid. Bicornuate uterus      SPECIMENS:  Endometrial curettings.     COMPLICATIONS:  None.     OPERATIVE REPORT IN DETAIL:  After assuring informed consent, the patient was   taken to the Operating Room where general anesthesia was administered.  She was   then placed in lithotomy position and then her vagina was prepped and draped in   usual sterile fashion.  Her bladder had been emptied out with an in-and-out   catheter while prepping the patient.  A weighted speculum was placed in the   vagina.  The anterior lip of the cervix was grasped with a single-tooth   tenaculum and the uterus was sounded to 8 cm.  The hysteroscope was inserted   with the findings noted above. The Myosure reach was inserted and the large polyp or fibrois was shaved.  A sharp curettage was performed and the specimen was sent for final pathology.  All instruments were removed.  The patient tolerated the   procedure well.  All counts were correct x2.  The patient was taken to Recovery   Room in stable condition.

## 2024-12-06 NOTE — TRANSFER OF CARE
"Anesthesia Transfer of Care Note    Patient: Radha Mike    Procedure(s) Performed: Procedure(s) (LRB):  HYSTEROSCOPY, WITH DILATION AND CURETTAGE OF UTERUS (N/A)    Patient location: PACU    Anesthesia Type: general    Transport from OR: Transported from OR on 6-10 L/min O2 by face mask with adequate spontaneous ventilation    Post pain: adequate analgesia    Post assessment: no apparent anesthetic complications and tolerated procedure well    Post vital signs: stable    Level of consciousness: awake    Nausea/Vomiting: no nausea/vomiting    Complications: none    Transfer of care protocol was followed      Last vitals: Visit Vitals  /70 (BP Location: Right arm, Patient Position: Lying)   Pulse 80   Temp 36.7 °C (98 °F) (Tympanic)   Resp 16   Ht 5' 7" (1.702 m)   Wt (!) 171.5 kg (378 lb)   LMP 05/25/2017 (Approximate)   SpO2 97%   Breastfeeding No   BMI 59.20 kg/m²     "

## 2024-12-06 NOTE — ANESTHESIA PREPROCEDURE EVALUATION
12/06/2024  Radha Mike is a 59 y.o., female.      Pre-op Assessment    I have reviewed the Patient Summary Reports.     I have reviewed the Nursing Notes. I have reviewed the NPO Status.   I have reviewed the Medications.     Review of Systems  Anesthesia Hx:  No problems with previous Anesthesia               Denies Personal Hx of Anesthesia complications.                    Social:  Non-Smoker       Cardiovascular:  Cardiovascular Normal                                              Pulmonary:        Sleep Apnea                Renal/:  Renal/ Normal                 Hepatic/GI:  Hepatic/GI Normal                    Neurological:  Neurology Normal                                      Endocrine:   Hypothyroidism        Morbid Obesity / BMI > 40      Physical Exam  General: Cooperative, Alert and Oriented    Airway:  Mallampati: II   Mouth Opening: Normal  TM Distance: Normal  Tongue: Normal  Neck ROM: Normal ROM    Dental:  Caps / Implants        Anesthesia Plan  Type of Anesthesia, risks & benefits discussed:    Anesthesia Type: Gen ETT  Intra-op Monitoring Plan: Standard ASA Monitors  Post Op Pain Control Plan: multimodal analgesia  Induction:  IV  Informed Consent: Informed consent signed with the Patient and all parties understand the risks and agree with anesthesia plan.  All questions answered.   ASA Score: 3  Day of Surgery Review of History & Physical: H&P Update referred to the surgeon/provider.    Ready For Surgery From Anesthesia Perspective.     .

## 2024-12-06 NOTE — DISCHARGE INSTRUCTIONS
Patient Instructions:   Pelvic rest x 4-6 weeks: no sex,  no tampons, no douching, no tub baths, showers only  Follow up if desired  No heavy lifting  Call for excessive vaginal bleeding, temperature greater than 100.6, redness or increasing pain to incision.  Discharge Medications: Norco    Diet: Regular      ANESTHESIA  -For the first 24 hours after surgery:  Do not drive, use heavy equipment, make important decisions, or drink alcohol  -It is normal to feel sleepy for several hours.  Rest until you are more awake.  -Have someone stay with you, if needed.  They can watch for problems and help keep you safe.  -Some possible post anesthesia side effects include: nausea and vomiting, sore throat and hoarseness, sleepiness, and dizziness.    PAIN  -If you have pain after surgery, pain medicine will help you feel better.  Take it as directed, before pain becomes severe.  Most pain relievers taken by mouth need at least 20-30 minutes to start working.  -Do not drive or drink alcohol while taking pain medicine.  -Pain medication can upset your stomach.  Taking them with a little food may help.  -Other ways to help control pain: elevation, ice, and relaxation  -Call your surgeon if still having unmanageable pain an hour after taking pain medicine.  -Pain medicine can cause constipation.  Taking an over-the counter stool softener while on prescription pain medicine and drinking plenty of fluids can prevent this side effect.  -Call your surgeon if you have severe side effects like: breathing problems, trouble waking up, dizziness, confusion, or severe constipation.    NAUSEA  -Some people have nausea after surgery.  This is often because of anesthesia, pain, pain medicine, or the stress of surgery.  -Do not push yourself to eat.  Start off with clear liquids and soup.  Slowly move to solid foods.  Don't eat fatty, rich, spicy foods at first.  Eat smaller amounts.  -If you develop persistent nausea and vomiting please notify  your surgeon immediately.    BLEEDING  -Different types of surgery require different types of care and dressing changes.  It is important to follow all instructions and advice from your surgeon.  Change dressing as directed.  Call your surgeon for any concerns regarding postop bleeding.    SIGNS OF INFECTION  -Signs of infection include: fever, swelling, drainage, and redness  -Notify your surgeon if you have a fever of 100.4 F (38.0 C) or higher.  -Notify your surgeon if you notice redness, swelling, increased pain, pus, or a foul smell at the incision site.

## 2024-12-06 NOTE — ANESTHESIA POSTPROCEDURE EVALUATION
Anesthesia Post Evaluation    Patient: Radha Mike    Procedure(s) Performed: Procedure(s) (LRB):  HYSTEROSCOPY, WITH DILATION AND CURETTAGE OF UTERUS (N/A)    Final Anesthesia Type: general      Patient location during evaluation: PACU  Patient participation: Yes- Able to Participate  Level of consciousness: awake and alert  Post-procedure vital signs: reviewed and stable  Pain management: adequate  Airway patency: patent    PONV status at discharge: No PONV  Anesthetic complications: no      Cardiovascular status: stable  Respiratory status: room air  Hydration status: euvolemic  Follow-up not needed.              Vitals Value Taken Time   /84 12/06/24 0838   Temp 36.7 °C (98 °F) 12/06/24 0838   Pulse 84 12/06/24 0843   Resp 20 12/06/24 0843   SpO2 92 % 12/06/24 0843   Vitals shown include unfiled device data.      Event Time   Out of Recovery 08:39:50         Pain/Zheng Score: Pain Rating Prior to Med Admin: 7 (12/6/2024  8:34 AM)  Zheng Score: 10 (12/6/2024  8:38 AM)

## 2024-12-09 ENCOUNTER — TELEPHONE (OUTPATIENT)
Dept: OBSTETRICS AND GYNECOLOGY | Facility: CLINIC | Age: 59
End: 2024-12-09
Payer: COMMERCIAL

## 2024-12-09 LAB
FINAL PATHOLOGIC DIAGNOSIS: NORMAL
GROSS: NORMAL
Lab: NORMAL

## 2024-12-09 NOTE — TELEPHONE ENCOUNTER
Patient notified complex hyperplasia with atypia. Needs hysterectomy with ability to do frozen section and if needed staging. Patient expressed understanding

## 2024-12-10 ENCOUNTER — TELEPHONE (OUTPATIENT)
Dept: GYNECOLOGIC ONCOLOGY | Facility: CLINIC | Age: 59
End: 2024-12-10
Payer: COMMERCIAL

## 2024-12-19 ENCOUNTER — OFFICE VISIT (OUTPATIENT)
Dept: GYNECOLOGIC ONCOLOGY | Facility: CLINIC | Age: 59
End: 2024-12-19
Payer: COMMERCIAL

## 2024-12-19 VITALS
WEIGHT: 293 LBS | SYSTOLIC BLOOD PRESSURE: 140 MMHG | HEART RATE: 82 BPM | DIASTOLIC BLOOD PRESSURE: 66 MMHG | OXYGEN SATURATION: 99 % | TEMPERATURE: 98 F | BODY MASS INDEX: 45.99 KG/M2 | HEIGHT: 67 IN

## 2024-12-19 DIAGNOSIS — N85.02 COMPLEX ATYPICAL ENDOMETRIAL HYPERPLASIA: Primary | ICD-10-CM

## 2024-12-19 DIAGNOSIS — E66.813 CLASS 3 SEVERE OBESITY DUE TO EXCESS CALORIES WITHOUT SERIOUS COMORBIDITY WITH BODY MASS INDEX (BMI) OF 50.0 TO 59.9 IN ADULT: ICD-10-CM

## 2024-12-19 DIAGNOSIS — E66.01 CLASS 3 SEVERE OBESITY DUE TO EXCESS CALORIES WITHOUT SERIOUS COMORBIDITY WITH BODY MASS INDEX (BMI) OF 50.0 TO 59.9 IN ADULT: ICD-10-CM

## 2024-12-19 PROCEDURE — 99999 PR PBB SHADOW E&M-EST. PATIENT-LVL III: CPT | Mod: PBBFAC,,, | Performed by: STUDENT IN AN ORGANIZED HEALTH CARE EDUCATION/TRAINING PROGRAM

## 2024-12-19 NOTE — H&P (VIEW-ONLY)
Referring Provider:  Luly He MD  [address] [place] [id number]   Subjective:      Patient ID: Radha Mike is a 59 y.o. female.    Chief Complaint: No chief complaint on file.    Problem List Items Addressed This Visit       Complex atypical endometrial hyperplasia - Primary      HPI Recent D&C resulted with complex atypical hyperplasia within a polyp. Here to discuss next steps.    Review of Systems   Constitutional:  Negative for chills, fatigue and fever.   Respiratory:  Negative for cough and shortness of breath.    Cardiovascular:  Negative for chest pain.   Gastrointestinal:  Negative for abdominal distention, abdominal pain, constipation and diarrhea.   Genitourinary:  Positive for vaginal bleeding. Negative for dysuria and pelvic pain.   Musculoskeletal:  Negative for back pain.   Psychiatric/Behavioral:  Negative for dysphoric mood. The patient is not nervous/anxious.      Past Medical History:   Diagnosis Date    Anemia     Arthritis     Psoriasis     Thyroid disease       Past Surgical History:   Procedure Laterality Date    ANKLE DEBRIDEMENT  2005    CHOLECYSTECTOMY  1988    COLONOSCOPY N/A 11/19/2024    Procedure: COLONOSCOPY;  Surgeon: Benigno Rosas MD;  Location: [address];  Service: Endoscopy;  Laterality: N/A;  8/20  BMI: 58.26  Referral: Jose Brito MD  [facility] portal and mailed  LW  11/11: precall complete EB  11/12-emailed instr to [email or web address]    D&C hysteroscopy  06/2017    HYSTEROSCOPIC POLYPECTOMY OF UTERUS N/A 12/6/2024    Procedure: POLYPECTOMY, UTERUS, HYSTEROSCOPIC;  Surgeon: Luly He MD;  Location: [facility] OR;  Service: OB/GYN;  Laterality: N/A;    HYSTEROSCOPY WITH DILATION AND CURETTAGE OF UTERUS N/A 12/6/2024    Procedure: HYSTEROSCOPY, WITH DILATION AND CURETTAGE OF UTERUS;  Surgeon: Luly He MD;  Location: [facility] OR;  Service: OB/GYN;  Laterality: N/A;    TUBAL LIGATION  1996      Family History   Problem  Relation Name Age of Onset    Diabetes Father      Cancer Father          mouth    Stomach cancer Father      Diabetes Mother      Breast cancer Mother      Heart attack Mother      Heart disease Mother          CHF    No Known Problems Brother      Arthritis Sister      Diabetes Sister      No Known Problems Daughter      Hypothyroidism Son      Colon cancer Neg Hx      Ovarian cancer Neg Hx        Social History     Socioeconomic History    Marital status:         Objective:      Vitals:    12/19/24 1105   BP: (!) 140/66   Pulse: 82   Temp: 98.1 °F (36.7 °C)      Physical Exam  Constitutional:       General: She is not in acute distress.  HENT:      Head: Normocephalic.   Eyes:      Extraocular Movements: Extraocular movements intact.      Conjunctiva/sclera: Conjunctivae normal.   Cardiovascular:      Rate and Rhythm: Normal rate.      Pulses: Normal pulses.   Pulmonary:      Effort: Pulmonary effort is normal. No respiratory distress.      Breath sounds: No wheezing.   Abdominal:      General: There is no distension.      Tenderness: There is no abdominal tenderness. There is no guarding or rebound.      Comments: RUQ scar from cholecystectomy   Genitourinary:     Comments: External genitalia normal. Vagina normal. Cervix with no visible lesions. Uterus mobile.  Exam limited by body habitus.  A female staff member was present for the exam    Musculoskeletal:         General: No deformity.   Neurological:      Mental Status: She is alert and oriented to person, place, and time.   Psychiatric:         Mood and Affect: Mood normal.         Behavior: Behavior normal.         Thought Content: Thought content normal.         Lab Results   Component Value Date    WBC 9.59 06/13/2024    HGB 12.2 06/13/2024    HCT 40.8 06/13/2024    MCV 76 (L) 06/13/2024     06/13/2024        Assessment:       Complex atypical endometrial hyperplasia         Plan:       Complex atypical hyperplasia: I had an extensive  conversation with the patient today giving a broad overview of endometrial hyperplasia and cancer to include epidemiology, risk factors, clinical features, diagnosis, as well as surgical treatment.  We discussed that patients with complex atypical hyperplasia have an approximately 40% risk of having concurrent endometrial cancer.  I have recommended robotic-assisted hysterectomy, bilateral salpingo-oophorectomy and sentinel lymph node mapping and biopsy.  Based on the data from surgery we will determine whether cancer is present and whether adjuvant therapy would be recommended. I discussed extensively the risks, benefits, alternatives, and indications of the planned procedure to include the risk of damage to bowel, bladder, ureter, or any other abdominal or pelvic organ as well as the risks of conversion to a laparotomy. Additionally, she understands the surgical risks of infection, allergic reaction, bleeding possibly severe enough to require blood transfusion, blood clots, and cardiopulmonary complications.  She expresses understanding, was given an opportunity to ask questions. We also discussed the option for hormonal therapy for patients who are unable or unwilling to have surgery. After all questions had been answered she strongly desires to proceed with planned procedure.  Plan for Robotic hysterectomy, BSO, and SLN biopsy  Taoism. She declines transfusion of blood products. She was provided with a refusal of blood transfusion form which she is planning to review in more detail and sign prior to surgery.    2. Obesity: BMI 59. Counseled on importance of diet and exercise to achieve and maintain a healthy weight. Reviewed increased surgical risks associated with obesity: wound infections, increase risk of surgical complications, increased risk of false identification of SLN.      As part of the medical decision making process I reviewed the referring provides notes, relevant labs, imaging reports  and independently interpreted the TVUS from 9/24/24.    Identified risk factors include obesity with a BMI of 59 and prior abdominal surgery.        Jt Boston MD

## 2024-12-19 NOTE — PROGRESS NOTES
Referring Provider:  Luly He MD  [address] [place] [id number]   Subjective:      Patient ID: Radha Mike is a 59 y.o. female.    Chief Complaint: No chief complaint on file.    Problem List Items Addressed This Visit       Complex atypical endometrial hyperplasia - Primary      HPI Recent D&C resulted with complex atypical hyperplasia within a polyp. Here to discuss next steps.    Review of Systems   Constitutional:  Negative for chills, fatigue and fever.   Respiratory:  Negative for cough and shortness of breath.    Cardiovascular:  Negative for chest pain.   Gastrointestinal:  Negative for abdominal distention, abdominal pain, constipation and diarrhea.   Genitourinary:  Positive for vaginal bleeding. Negative for dysuria and pelvic pain.   Musculoskeletal:  Negative for back pain.   Psychiatric/Behavioral:  Negative for dysphoric mood. The patient is not nervous/anxious.      Past Medical History:   Diagnosis Date    Anemia     Arthritis     Psoriasis     Thyroid disease       Past Surgical History:   Procedure Laterality Date    ANKLE DEBRIDEMENT  2005    CHOLECYSTECTOMY  1988    COLONOSCOPY N/A 11/19/2024    Procedure: COLONOSCOPY;  Surgeon: Benigno Rosas MD;  Location: [address];  Service: Endoscopy;  Laterality: N/A;  8/20  BMI: 58.26  Referral: Jose Brito MD  [facility] portal and mailed  LW  11/11: precall complete EB  11/12-emailed instr to [email or web address]    D&C hysteroscopy  06/2017    HYSTEROSCOPIC POLYPECTOMY OF UTERUS N/A 12/6/2024    Procedure: POLYPECTOMY, UTERUS, HYSTEROSCOPIC;  Surgeon: Luly He MD;  Location: [facility] OR;  Service: OB/GYN;  Laterality: N/A;    HYSTEROSCOPY WITH DILATION AND CURETTAGE OF UTERUS N/A 12/6/2024    Procedure: HYSTEROSCOPY, WITH DILATION AND CURETTAGE OF UTERUS;  Surgeon: Luly He MD;  Location: [facility] OR;  Service: OB/GYN;  Laterality: N/A;    TUBAL LIGATION  1996      Family History   Problem  Relation Name Age of Onset    Diabetes Father      Cancer Father          mouth    Stomach cancer Father      Diabetes Mother      Breast cancer Mother      Heart attack Mother      Heart disease Mother          CHF    No Known Problems Brother      Arthritis Sister      Diabetes Sister      No Known Problems Daughter      Hypothyroidism Son      Colon cancer Neg Hx      Ovarian cancer Neg Hx        Social History     Socioeconomic History    Marital status:         Objective:      Vitals:    12/19/24 1105   BP: (!) 140/66   Pulse: 82   Temp: 98.1 °F (36.7 °C)      Physical Exam  Constitutional:       General: She is not in acute distress.  HENT:      Head: Normocephalic.   Eyes:      Extraocular Movements: Extraocular movements intact.      Conjunctiva/sclera: Conjunctivae normal.   Cardiovascular:      Rate and Rhythm: Normal rate.      Pulses: Normal pulses.   Pulmonary:      Effort: Pulmonary effort is normal. No respiratory distress.      Breath sounds: No wheezing.   Abdominal:      General: There is no distension.      Tenderness: There is no abdominal tenderness. There is no guarding or rebound.      Comments: RUQ scar from cholecystectomy   Genitourinary:     Comments: External genitalia normal. Vagina normal. Cervix with no visible lesions. Uterus mobile.  Exam limited by body habitus.  A female staff member was present for the exam    Musculoskeletal:         General: No deformity.   Neurological:      Mental Status: She is alert and oriented to person, place, and time.   Psychiatric:         Mood and Affect: Mood normal.         Behavior: Behavior normal.         Thought Content: Thought content normal.         Lab Results   Component Value Date    WBC 9.59 06/13/2024    HGB 12.2 06/13/2024    HCT 40.8 06/13/2024    MCV 76 (L) 06/13/2024     06/13/2024        Assessment:       Complex atypical endometrial hyperplasia         Plan:       Complex atypical hyperplasia: I had an extensive  conversation with the patient today giving a broad overview of endometrial hyperplasia and cancer to include epidemiology, risk factors, clinical features, diagnosis, as well as surgical treatment.  We discussed that patients with complex atypical hyperplasia have an approximately 40% risk of having concurrent endometrial cancer.  I have recommended robotic-assisted hysterectomy, bilateral salpingo-oophorectomy and sentinel lymph node mapping and biopsy.  Based on the data from surgery we will determine whether cancer is present and whether adjuvant therapy would be recommended. I discussed extensively the risks, benefits, alternatives, and indications of the planned procedure to include the risk of damage to bowel, bladder, ureter, or any other abdominal or pelvic organ as well as the risks of conversion to a laparotomy. Additionally, she understands the surgical risks of infection, allergic reaction, bleeding possibly severe enough to require blood transfusion, blood clots, and cardiopulmonary complications.  She expresses understanding, was given an opportunity to ask questions. We also discussed the option for hormonal therapy for patients who are unable or unwilling to have surgery. After all questions had been answered she strongly desires to proceed with planned procedure.  Plan for Robotic hysterectomy, BSO, and SLN biopsy  [place]. She declines transfusion of blood products. She was provided with a refusal of blood transfusion form which she is planning to review in more detail and sign prior to surgery.    2. Obesity: BMI 59. Counseled on importance of diet and exercise to achieve and maintain a healthy weight. Reviewed increased surgical risks associated with obesity: wound infections, increase risk of surgical complications, increased risk of false identification of SLN.      As part of the medical decision making process I reviewed the referring provides notes, relevant labs, imaging reports  and independently interpreted the TVUS from 9/24/24.    Identified risk factors include obesity with a BMI of 59 and prior abdominal surgery.        Jt Boston MD

## 2024-12-19 NOTE — Clinical Note
Luly, thanks for sending Radha to see me. Her and her  are very sweet. We are planning for surgery in January.  I hope you have a Neelimary Chai, and please reach out anytime!

## 2024-12-19 NOTE — PROGRESS NOTES
Surgery Teaching completed.   Discussed surgery consents/a copy refusal blood consent form given to review. VU.  A copy of consent given to patient.  Enhanced Recovery to Gynecological Surgery:  Pre-op/Post-op visits. Ajit will reach out to her to schedule the appointments. Additional information will be provided. VU.  Pain Management. Tylenol/Ibuprofen and Narcotic. VU  Activity after surgery. Booklet in folder to review.VU.

## 2024-12-20 ENCOUNTER — TELEPHONE (OUTPATIENT)
Dept: GYNECOLOGIC ONCOLOGY | Facility: CLINIC | Age: 59
End: 2024-12-20
Payer: COMMERCIAL

## 2024-12-20 DIAGNOSIS — N93.9 ABNORMAL UTERINE BLEEDING (AUB): ICD-10-CM

## 2024-12-20 DIAGNOSIS — D25.9 UTERINE FIBROID: ICD-10-CM

## 2024-12-20 DIAGNOSIS — N85.02 COMPLEX ATYPICAL ENDOMETRIAL HYPERPLASIA: Primary | ICD-10-CM

## 2024-12-31 ENCOUNTER — ANESTHESIA EVENT (OUTPATIENT)
Dept: SURGERY | Facility: OTHER | Age: 59
End: 2024-12-31
Payer: COMMERCIAL

## 2024-12-31 RX ORDER — LIDOCAINE HYDROCHLORIDE 10 MG/ML
0.5 INJECTION, SOLUTION EPIDURAL; INFILTRATION; INTRACAUDAL; PERINEURAL ONCE
OUTPATIENT
Start: 2024-12-31 | End: 2024-12-31

## 2024-12-31 RX ORDER — PREGABALIN 75 MG/1
75 CAPSULE ORAL ONCE
OUTPATIENT
Start: 2024-12-31 | End: 2024-12-31

## 2024-12-31 RX ORDER — ACETAMINOPHEN 500 MG
1000 TABLET ORAL
OUTPATIENT
Start: 2024-12-31 | End: 2024-12-31

## 2024-12-31 RX ORDER — SODIUM CHLORIDE, SODIUM LACTATE, POTASSIUM CHLORIDE, CALCIUM CHLORIDE 600; 310; 30; 20 MG/100ML; MG/100ML; MG/100ML; MG/100ML
INJECTION, SOLUTION INTRAVENOUS CONTINUOUS
OUTPATIENT
Start: 2024-12-31

## 2025-01-02 ENCOUNTER — HOSPITAL ENCOUNTER (OUTPATIENT)
Dept: PREADMISSION TESTING | Facility: OTHER | Age: 60
Discharge: HOME | End: 2025-01-02
Attending: STUDENT IN AN ORGANIZED HEALTH CARE EDUCATION/TRAINING PROGRAM
Payer: COMMERCIAL

## 2025-01-02 VITALS
OXYGEN SATURATION: 98 % | BODY MASS INDEX: 45.99 KG/M2 | WEIGHT: 293 LBS | DIASTOLIC BLOOD PRESSURE: 73 MMHG | HEIGHT: 67 IN | TEMPERATURE: 97 F | SYSTOLIC BLOOD PRESSURE: 142 MMHG | HEART RATE: 85 BPM

## 2025-01-02 DIAGNOSIS — Z01.818 PREOP TESTING: Primary | ICD-10-CM

## 2025-01-02 LAB
BASOPHILS # BLD AUTO: 0.04 K/UL (ref 0–0.2)
BASOPHILS NFR BLD: 0.4 % (ref 0–1.9)
DIFFERENTIAL METHOD BLD: ABNORMAL
EOSINOPHIL # BLD AUTO: 0.5 K/UL (ref 0–0.5)
EOSINOPHIL NFR BLD: 4.8 % (ref 0–8)
ERYTHROCYTE [DISTWIDTH] IN BLOOD BY AUTOMATED COUNT: 16.2 % (ref 11.5–14.5)
HCT VFR BLD AUTO: 38.8 % (ref 37–48.5)
HGB BLD-MCNC: 12 G/DL (ref 12–16)
IMM GRANULOCYTES # BLD AUTO: 0.04 K/UL (ref 0–0.04)
IMM GRANULOCYTES NFR BLD AUTO: 0.4 % (ref 0–0.5)
LYMPHOCYTES # BLD AUTO: 2.1 K/UL (ref 1–4.8)
LYMPHOCYTES NFR BLD: 21.1 % (ref 18–48)
MCH RBC QN AUTO: 23.2 PG (ref 27–31)
MCHC RBC AUTO-ENTMCNC: 30.9 G/DL (ref 32–36)
MCV RBC AUTO: 75 FL (ref 82–98)
MONOCYTES # BLD AUTO: 0.7 K/UL (ref 0.3–1)
MONOCYTES NFR BLD: 6.9 % (ref 4–15)
NEUTROPHILS # BLD AUTO: 6.5 K/UL (ref 1.8–7.7)
NEUTROPHILS NFR BLD: 66.4 % (ref 38–73)
NRBC BLD-RTO: 0 /100 WBC
PLATELET # BLD AUTO: 322 K/UL (ref 150–450)
PMV BLD AUTO: 10.8 FL (ref 9.2–12.9)
RBC # BLD AUTO: 5.18 M/UL (ref 4–5.4)
WBC # BLD AUTO: 9.78 K/UL (ref 3.9–12.7)

## 2025-01-02 PROCEDURE — 36415 COLL VENOUS BLD VENIPUNCTURE: CPT | Performed by: ANESTHESIOLOGY

## 2025-01-02 PROCEDURE — 85025 COMPLETE CBC W/AUTO DIFF WBC: CPT | Performed by: ANESTHESIOLOGY

## 2025-01-02 NOTE — ANESTHESIA PREPROCEDURE EVALUATION
01/02/2025  Radha Mike is a 59 y.o., female.      Pre-op Assessment    I have reviewed the Patient Summary Reports.     I have reviewed the Nursing Notes. I have reviewed the NPO Status.   I have reviewed the Medications.     Review of Systems  Anesthesia Hx:  No problems with previous Anesthesia             Denies Family Hx of Anesthesia complications.    Denies Personal Hx of Anesthesia complications.                    Social:  Non-Smoker       Hematology/Oncology:  Hematology Normal   Oncology Normal                                   EENT/Dental:  EENT/Dental Normal           Cardiovascular:  Cardiovascular Normal                                              Pulmonary:        Sleep Apnea                Renal/:  Renal/ Normal                 Hepatic/GI:  Hepatic/GI Normal                    Musculoskeletal:  Musculoskeletal Normal                Neurological:  Neurology Normal                                      Endocrine:   Hypothyroidism          Dermatological:  Skin Normal    Psych:  Psychiatric Normal                    Physical Exam  General: Well nourished and Alert    Airway:  Mallampati: II   Mouth Opening: Normal  Tongue: Normal    Dental:  Intact        Anesthesia Plan  Type of Anesthesia, risks & benefits discussed:    Anesthesia Type: Gen ETT  Intra-op Monitoring Plan: Standard ASA Monitors  Post Op Pain Control Plan: multimodal analgesia  Induction:  IV  Airway Plan: Video  Informed Consent: Informed consent signed with the Patient and all parties understand the risks and agree with anesthesia plan.  All questions answered.   ASA Score: 3  Anesthesia Plan Notes: Jehovah Witness  CBC pending    Ready For Surgery From Anesthesia Perspective.     .

## 2025-01-02 NOTE — DISCHARGE INSTRUCTIONS
Information to Prepare you for your Surgery    PRE-ADMIT TESTING -  [phone]    [address]  [facility]          Your surgery has been scheduled at [facility]. We are pleased to have the opportunity to serve you. For Further Information please call [phone].    On the day of surgery please report to the Information Desk on the 1st floor.    CONTACT YOUR PHYSICIAN'S OFFICE THE DAY PRIOR TO YOUR SURGERY TO OBTAIN YOUR ARRIVAL TIME.     The evening before surgery do not eat anything after 9 p.m. ( this includes hard candy, chewing gum and mints).  You may only have GATORADE, POWERADE AND WATER  from 9 p.m. until you leave your home.   DO NOT DRINK ANY LIQUIDS ON THE WAY TO THE HOSPITAL.      Why does your anesthesiologist allow you to drink Gatorade/Powerade before surgery?  Gatorade/Powerade helps to increase your comfort before surgery and to decrease your nausea after surgery. The carbohydrates in Gatorade/Powerade help reduce your body's stress response to surgery.  If you are a diabetic-drink only water prior to surgery.    Outpatient Surgery- May allow 2 adult (18 and older) Support Persons (1 being the designated ) for all surgical/procedural patients. A breastfeeding mother will be allowed her infant and 2 adult Support Persons. No one under the age of 18 will be allowed in the building. No swapping out of visitors in the [facility].      SPECIAL MEDICATION INSTRUCTIONS: TAKE medications checked off by the Anesthesiologist on your Medication List.    Angiogram Patients: Take medications as instructed by your physician, including aspirin.     Surgery Patients:    If you take ASPIRIN - Your PHYSICIAN/SURGEON will need to inform you IF/OR when you need to stop taking aspirin prior to your surgery.     The week prior to surgery do not ot take any medications containing IBUPROFEN or NSAIDS ( Advil, Motrin, Goodys, BC, Aleve, Naproxen etc)  If you are not sure if you should take a medicine please call your surgeon's office.  Ok to take Tylenol    Do Not Wear any make-up (especially eye make-up) to surgery. Please remove any false eyelashes or eyelash extensions. If you arrive the day of surgery with makeup/eyelashes on you will be required to remove prior to surgery. (There is a risk of corneal abrasions if eye makeup/eyelash extensions are not removed)      Leave all valuables at home.   Do Not wear any jewelry or watches, including any metal in body piercings. Jewelry must be removed prior to coming to the hospital.  There is a possibility that rings that are unable to be removed may be cut off if they are on the surgical extremity.    Please remove all hair extensions, wigs, clips and any other metal accessories/ ornaments from your hair.  These items may pose a flammable/fire risk in Surgery and must be removed.    Do not shave your surgical area at least 5 days prior to your surgery. The surgical prep will be performed at the hospital according to Infection Control regulations.    Contact Lens must be removed before surgery. Either do not wear the contact lens or bring a case and solution for storage.  Please bring a container for eyeglasses or dentures as required.  Bring any paperwork your physician has provided, such as consent forms,  history and physicals, doctor's orders, etc.   Bring comfortable clothes that are loose fitting to wear upon discharge. Take into consideration the type of surgery being performed.  Maintain your diet as advised per your physician the day prior to surgery.      Adequate rest the night before surgery is advised.   Park in the Parking lot behind the hospital or in the [facility] across the street from the parking lot. Parking is complimentary.  If you will be discharged the same day as your procedure, please arrange for a responsible adult to drive you home or to accompany you if traveling by taxi.    YOU WILL NOT BE PERMITTED TO DRIVE OR TO LEAVE THE HOSPITAL ALONE AFTER SURGERY.   If you are being discharged the same day, it is strongly recommended that you arrange for someone to remain with you for the first 24 hrs following your surgery.    The Surgeon will speak to your family/visitor after your surgery regarding the outcome of your surgery and post op care.  The Surgeon may speak to you after your surgery, but there is a possibility you may not remember the details.  Please check with your family members regarding the conversation with the Surgeon.    We strongly recommend whoever is bringing you home be present for discharge instructions.  This will ensure a thorough understanding for your post op home care.    If the patient has fever, cough, or signs/symptoms of Flu or Covid please do not come in for your surgery. Contact your surgeon and your primary care physician for further instructions.           Thank you for your cooperation.  The Staff of [facility].            Bathing Instructions with Hibiclens    Shower the evening before and morning of your procedure with Chlorhexidine (Hibiclens)  do not use Chlorhexidine on your face or genitals. Do not get in your eyes.  Wash your face with water and your regular face wash/soap  Use your regular shampoo  Apply Chlorhexidine (Hibiclens) directly on your skin or on a wet washcloth and wash gently. When showering: Move away from the shower stream when applying Chlorhexidine (Hibiclens) to avoid rinsing off too soon.  Rinse thoroughly with warm water  Do not dilute Chlorhexidine (Hibiclens)   Dry off as usual, do not use any deodorant, powder, body lotions, perfume, after shave or cologne.

## 2025-01-06 ENCOUNTER — TELEPHONE (OUTPATIENT)
Dept: GYNECOLOGIC ONCOLOGY | Facility: CLINIC | Age: 60
End: 2025-01-06
Payer: COMMERCIAL

## 2025-01-08 ENCOUNTER — ANESTHESIA (OUTPATIENT)
Dept: SURGERY | Facility: OTHER | Age: 60
End: 2025-01-08
Payer: COMMERCIAL

## 2025-01-08 ENCOUNTER — HOSPITAL ENCOUNTER (OUTPATIENT)
Facility: OTHER | Age: 60
Discharge: HOME | End: 2025-01-08
Attending: STUDENT IN AN ORGANIZED HEALTH CARE EDUCATION/TRAINING PROGRAM | Admitting: STUDENT IN AN ORGANIZED HEALTH CARE EDUCATION/TRAINING PROGRAM
Payer: COMMERCIAL

## 2025-01-08 DIAGNOSIS — N85.02 COMPLEX ATYPICAL ENDOMETRIAL HYPERPLASIA: ICD-10-CM

## 2025-01-08 DIAGNOSIS — Z90.710 STATUS POST HYSTERECTOMY: Primary | ICD-10-CM

## 2025-01-08 LAB — POCT GLUCOSE: 130 MG/DL (ref 70–110)

## 2025-01-08 PROCEDURE — 63600175 PHARM REV CODE 636 W HCPCS: Performed by: STUDENT IN AN ORGANIZED HEALTH CARE EDUCATION/TRAINING PROGRAM

## 2025-01-08 PROCEDURE — 25000003 PHARM REV CODE 250: Performed by: STUDENT IN AN ORGANIZED HEALTH CARE EDUCATION/TRAINING PROGRAM

## 2025-01-08 PROCEDURE — 63600175 PHARM REV CODE 636 W HCPCS: Mod: TB | Performed by: ANESTHESIOLOGY

## 2025-01-08 PROCEDURE — 88309 TISSUE EXAM BY PATHOLOGIST: CPT | Mod: 26,,, | Performed by: PATHOLOGY

## 2025-01-08 PROCEDURE — 63600175 PHARM REV CODE 636 W HCPCS: Performed by: ANESTHESIOLOGY

## 2025-01-08 PROCEDURE — 88309 TISSUE EXAM BY PATHOLOGIST: CPT | Performed by: PATHOLOGY

## 2025-01-08 PROCEDURE — 25000003 PHARM REV CODE 250

## 2025-01-08 PROCEDURE — 36000713 HC OR TIME LEV V EA ADD 15 MIN: Performed by: STUDENT IN AN ORGANIZED HEALTH CARE EDUCATION/TRAINING PROGRAM

## 2025-01-08 PROCEDURE — 58573 TLH W/T/O UTERUS OVER 250 G: CPT | Mod: AS,22,, | Performed by: PHYSICIAN ASSISTANT

## 2025-01-08 PROCEDURE — 37000009 HC ANESTHESIA EA ADD 15 MINS: Performed by: STUDENT IN AN ORGANIZED HEALTH CARE EDUCATION/TRAINING PROGRAM

## 2025-01-08 PROCEDURE — 71000033 HC RECOVERY, INTIAL HOUR: Performed by: STUDENT IN AN ORGANIZED HEALTH CARE EDUCATION/TRAINING PROGRAM

## 2025-01-08 PROCEDURE — 71000015 HC POSTOP RECOV 1ST HR: Performed by: STUDENT IN AN ORGANIZED HEALTH CARE EDUCATION/TRAINING PROGRAM

## 2025-01-08 PROCEDURE — 25000003 PHARM REV CODE 250: Performed by: ANESTHESIOLOGY

## 2025-01-08 PROCEDURE — 36000712 HC OR TIME LEV V 1ST 15 MIN: Performed by: STUDENT IN AN ORGANIZED HEALTH CARE EDUCATION/TRAINING PROGRAM

## 2025-01-08 PROCEDURE — 27201423 OPTIME MED/SURG SUP & DEVICES STERILE SUPPLY: Performed by: STUDENT IN AN ORGANIZED HEALTH CARE EDUCATION/TRAINING PROGRAM

## 2025-01-08 PROCEDURE — 63600175 PHARM REV CODE 636 W HCPCS

## 2025-01-08 PROCEDURE — 58573 TLH W/T/O UTERUS OVER 250 G: CPT | Mod: 22,,, | Performed by: STUDENT IN AN ORGANIZED HEALTH CARE EDUCATION/TRAINING PROGRAM

## 2025-01-08 PROCEDURE — 37000008 HC ANESTHESIA 1ST 15 MINUTES: Performed by: STUDENT IN AN ORGANIZED HEALTH CARE EDUCATION/TRAINING PROGRAM

## 2025-01-08 PROCEDURE — 71000016 HC POSTOP RECOV ADDL HR: Performed by: STUDENT IN AN ORGANIZED HEALTH CARE EDUCATION/TRAINING PROGRAM

## 2025-01-08 RX ORDER — SUCCINYLCHOLINE CHLORIDE 20 MG/ML
INJECTION INTRAMUSCULAR; INTRAVENOUS
Status: DISCONTINUED | OUTPATIENT
Start: 2025-01-08 | End: 2025-01-08

## 2025-01-08 RX ORDER — PHENYLEPHRINE HYDROCHLORIDE 10 MG/ML
INJECTION INTRAVENOUS
Status: DISCONTINUED | OUTPATIENT
Start: 2025-01-08 | End: 2025-01-08

## 2025-01-08 RX ORDER — AMOXICILLIN 250 MG
1 CAPSULE ORAL DAILY PRN
Qty: 30 TABLET | Refills: 1 | Status: SHIPPED | OUTPATIENT
Start: 2025-01-08

## 2025-01-08 RX ORDER — ROCURONIUM BROMIDE 10 MG/ML
INJECTION, SOLUTION INTRAVENOUS
Status: DISCONTINUED | OUTPATIENT
Start: 2025-01-08 | End: 2025-01-08

## 2025-01-08 RX ORDER — ACETAMINOPHEN 10 MG/ML
INJECTION, SOLUTION INTRAVENOUS
Status: DISCONTINUED | OUTPATIENT
Start: 2025-01-08 | End: 2025-01-08

## 2025-01-08 RX ORDER — ONDANSETRON HYDROCHLORIDE 2 MG/ML
INJECTION, SOLUTION INTRAVENOUS
Status: DISCONTINUED | OUTPATIENT
Start: 2025-01-08 | End: 2025-01-08

## 2025-01-08 RX ORDER — DEXTROMETHORPHAN HYDROBROMIDE, GUAIFENESIN 5; 100 MG/5ML; MG/5ML
650 LIQUID ORAL EVERY 6 HOURS PRN
Qty: 60 TABLET | Refills: 1 | Status: SHIPPED | OUTPATIENT
Start: 2025-01-08

## 2025-01-08 RX ORDER — OXYCODONE HYDROCHLORIDE 5 MG/1
5 TABLET ORAL
Status: DISCONTINUED | OUTPATIENT
Start: 2025-01-08 | End: 2025-01-08 | Stop reason: HOSPADM

## 2025-01-08 RX ORDER — FENTANYL CITRATE 50 UG/ML
INJECTION, SOLUTION INTRAMUSCULAR; INTRAVENOUS
Status: DISCONTINUED | OUTPATIENT
Start: 2025-01-08 | End: 2025-01-08

## 2025-01-08 RX ORDER — PROPOFOL 10 MG/ML
VIAL (ML) INTRAVENOUS
Status: DISCONTINUED | OUTPATIENT
Start: 2025-01-08 | End: 2025-01-08

## 2025-01-08 RX ORDER — CEFAZOLIN SODIUM 1 G/3ML
3 INJECTION, POWDER, FOR SOLUTION INTRAMUSCULAR; INTRAVENOUS
Status: COMPLETED | OUTPATIENT
Start: 2025-01-08 | End: 2025-01-08

## 2025-01-08 RX ORDER — LIDOCAINE HYDROCHLORIDE 10 MG/ML
0.5 INJECTION, SOLUTION EPIDURAL; INFILTRATION; INTRACAUDAL; PERINEURAL ONCE
Status: DISCONTINUED | OUTPATIENT
Start: 2025-01-08 | End: 2025-01-08 | Stop reason: HOSPADM

## 2025-01-08 RX ORDER — HEPARIN SODIUM 5000 [USP'U]/ML
7500 INJECTION, SOLUTION INTRAVENOUS; SUBCUTANEOUS
Status: COMPLETED | OUTPATIENT
Start: 2025-01-08 | End: 2025-01-08

## 2025-01-08 RX ORDER — DEXAMETHASONE SODIUM PHOSPHATE 4 MG/ML
INJECTION, SOLUTION INTRA-ARTICULAR; INTRALESIONAL; INTRAMUSCULAR; INTRAVENOUS; SOFT TISSUE
Status: DISCONTINUED | OUTPATIENT
Start: 2025-01-08 | End: 2025-01-08

## 2025-01-08 RX ORDER — LIDOCAINE HYDROCHLORIDE 20 MG/ML
INJECTION INTRAVENOUS
Status: DISCONTINUED | OUTPATIENT
Start: 2025-01-08 | End: 2025-01-08

## 2025-01-08 RX ORDER — SODIUM CHLORIDE, SODIUM LACTATE, POTASSIUM CHLORIDE, CALCIUM CHLORIDE 600; 310; 30; 20 MG/100ML; MG/100ML; MG/100ML; MG/100ML
INJECTION, SOLUTION INTRAVENOUS CONTINUOUS
Status: DISCONTINUED | OUTPATIENT
Start: 2025-01-08 | End: 2025-01-08 | Stop reason: HOSPADM

## 2025-01-08 RX ORDER — OXYCODONE HYDROCHLORIDE 5 MG/1
5 TABLET ORAL EVERY 4 HOURS PRN
Qty: 10 TABLET | Refills: 0 | Status: SHIPPED | OUTPATIENT
Start: 2025-01-08

## 2025-01-08 RX ORDER — PROCHLORPERAZINE EDISYLATE 5 MG/ML
5 INJECTION INTRAMUSCULAR; INTRAVENOUS EVERY 30 MIN PRN
Status: DISCONTINUED | OUTPATIENT
Start: 2025-01-08 | End: 2025-01-08 | Stop reason: HOSPADM

## 2025-01-08 RX ORDER — KETOROLAC TROMETHAMINE 30 MG/ML
INJECTION, SOLUTION INTRAMUSCULAR; INTRAVENOUS
Status: DISCONTINUED | OUTPATIENT
Start: 2025-01-08 | End: 2025-01-08

## 2025-01-08 RX ORDER — MEPERIDINE HYDROCHLORIDE 25 MG/ML
12.5 INJECTION INTRAMUSCULAR; INTRAVENOUS; SUBCUTANEOUS ONCE AS NEEDED
Status: DISCONTINUED | OUTPATIENT
Start: 2025-01-08 | End: 2025-01-08 | Stop reason: HOSPADM

## 2025-01-08 RX ORDER — PREGABALIN 75 MG/1
75 CAPSULE ORAL ONCE
Status: COMPLETED | OUTPATIENT
Start: 2025-01-08 | End: 2025-01-08

## 2025-01-08 RX ORDER — KETAMINE HCL IN 0.9 % NACL 50 MG/5 ML
SYRINGE (ML) INTRAVENOUS
Status: DISCONTINUED | OUTPATIENT
Start: 2025-01-08 | End: 2025-01-08

## 2025-01-08 RX ORDER — IBUPROFEN 600 MG/1
600 TABLET ORAL EVERY 6 HOURS PRN
Qty: 60 TABLET | Refills: 1 | Status: SHIPPED | OUTPATIENT
Start: 2025-01-08

## 2025-01-08 RX ORDER — GLUCAGON 1 MG
1 KIT INJECTION
Status: DISCONTINUED | OUTPATIENT
Start: 2025-01-08 | End: 2025-01-08 | Stop reason: HOSPADM

## 2025-01-08 RX ORDER — ACETAMINOPHEN 500 MG
1000 TABLET ORAL
Status: COMPLETED | OUTPATIENT
Start: 2025-01-08 | End: 2025-01-08

## 2025-01-08 RX ORDER — INDOCYANINE GREEN AND WATER 25 MG
KIT INJECTION
Status: DISCONTINUED | OUTPATIENT
Start: 2025-01-08 | End: 2025-01-08 | Stop reason: HOSPADM

## 2025-01-08 RX ORDER — SODIUM CHLORIDE 0.9 % (FLUSH) 0.9 %
3 SYRINGE (ML) INJECTION
Status: DISCONTINUED | OUTPATIENT
Start: 2025-01-08 | End: 2025-01-08 | Stop reason: HOSPADM

## 2025-01-08 RX ORDER — HYDROMORPHONE HYDROCHLORIDE 2 MG/ML
0.4 INJECTION, SOLUTION INTRAMUSCULAR; INTRAVENOUS; SUBCUTANEOUS EVERY 5 MIN PRN
Status: DISCONTINUED | OUTPATIENT
Start: 2025-01-08 | End: 2025-01-08 | Stop reason: HOSPADM

## 2025-01-08 RX ORDER — LIDOCAINE HYDROCHLORIDE 10 MG/ML
1 INJECTION, SOLUTION EPIDURAL; INFILTRATION; INTRACAUDAL; PERINEURAL ONCE
Status: DISCONTINUED | OUTPATIENT
Start: 2025-01-08 | End: 2025-01-08 | Stop reason: HOSPADM

## 2025-01-08 RX ADMIN — SODIUM CHLORIDE: 0.9 INJECTION, SOLUTION INTRAVENOUS at 03:01

## 2025-01-08 RX ADMIN — ACETAMINOPHEN 1000 MG: 500 TABLET, FILM COATED ORAL at 08:01

## 2025-01-08 RX ADMIN — FENTANYL CITRATE 100 MCG: 50 INJECTION, SOLUTION INTRAMUSCULAR; INTRAVENOUS at 03:01

## 2025-01-08 RX ADMIN — PHENYLEPHRINE HYDROCHLORIDE 300 MCG: 10 INJECTION INTRAVENOUS at 03:01

## 2025-01-08 RX ADMIN — OXYCODONE HYDROCHLORIDE 5 MG: 5 TABLET ORAL at 09:01

## 2025-01-08 RX ADMIN — ROCURONIUM BROMIDE 20 MG: 10 SOLUTION INTRAVENOUS at 04:01

## 2025-01-08 RX ADMIN — CEFAZOLIN 3 G: 330 INJECTION, POWDER, FOR SOLUTION INTRAMUSCULAR; INTRAVENOUS at 03:01

## 2025-01-08 RX ADMIN — LIDOCAINE HYDROCHLORIDE 100 MG: 20 INJECTION, SOLUTION INTRAVENOUS at 03:01

## 2025-01-08 RX ADMIN — Medication 50 MG: at 04:01

## 2025-01-08 RX ADMIN — OXYCODONE HYDROCHLORIDE 5 MG: 5 TABLET ORAL at 06:01

## 2025-01-08 RX ADMIN — SODIUM CHLORIDE, SODIUM LACTATE, POTASSIUM CHLORIDE, AND CALCIUM CHLORIDE: .6; .31; .03; .02 INJECTION, SOLUTION INTRAVENOUS at 01:01

## 2025-01-08 RX ADMIN — HYDROMORPHONE HYDROCHLORIDE 0.4 MG: 2 INJECTION INTRAMUSCULAR; INTRAVENOUS; SUBCUTANEOUS at 06:01

## 2025-01-08 RX ADMIN — SUGAMMADEX 400 MG: 100 INJECTION, SOLUTION INTRAVENOUS at 05:01

## 2025-01-08 RX ADMIN — PROPOFOL 30 MG: 10 INJECTION, EMULSION INTRAVENOUS at 05:01

## 2025-01-08 RX ADMIN — FENTANYL CITRATE 50 MCG: 50 INJECTION, SOLUTION INTRAMUSCULAR; INTRAVENOUS at 04:01

## 2025-01-08 RX ADMIN — LIDOCAINE HYDROCHLORIDE 100 MG: 20 INJECTION, SOLUTION INTRAVENOUS at 05:01

## 2025-01-08 RX ADMIN — ACETAMINOPHEN 1000 MG: 10 INJECTION INTRAVENOUS at 05:01

## 2025-01-08 RX ADMIN — ONDANSETRON HYDROCHLORIDE 4 MG: 2 INJECTION INTRAMUSCULAR; INTRAVENOUS at 03:01

## 2025-01-08 RX ADMIN — SUCCINYLCHOLINE CHLORIDE 160 MG: 20 INJECTION, SOLUTION INTRAMUSCULAR; INTRAVENOUS at 03:01

## 2025-01-08 RX ADMIN — KETOROLAC TROMETHAMINE 30 MG: 30 INJECTION, SOLUTION INTRAMUSCULAR; INTRAVENOUS at 05:01

## 2025-01-08 RX ADMIN — SODIUM CHLORIDE: 0.9 INJECTION, SOLUTION INTRAVENOUS at 05:01

## 2025-01-08 RX ADMIN — PROPOFOL 200 MG: 10 INJECTION, EMULSION INTRAVENOUS at 03:01

## 2025-01-08 RX ADMIN — ROCURONIUM BROMIDE 50 MG: 10 SOLUTION INTRAVENOUS at 03:01

## 2025-01-08 RX ADMIN — DEXAMETHASONE SODIUM PHOSPHATE 8 MG: 4 INJECTION, SOLUTION INTRAMUSCULAR; INTRAVENOUS at 03:01

## 2025-01-08 RX ADMIN — HEPARIN SODIUM 7500 UNITS: 5000 INJECTION INTRAVENOUS; SUBCUTANEOUS at 01:01

## 2025-01-08 RX ADMIN — PREGABALIN 75 MG: 75 CAPSULE ORAL at 08:01

## 2025-01-08 NOTE — OP NOTE
DATE OF PROCEDURE:  1/8/25     SURGEON: Jt Boston MD        ASSISTANTS: Gay Rios who served as first assist       PREOPERATIVE DIAGNOSES: Complex atypical hyperplasia  Morbid obesity BMI 59     POSTOPERATIVE DIAGNOSES: Complex atypical hyperplasia  Morbid obesity BMI 59     PROCEDURES:  Robotic-assisted laparoscopic hysterectomy and bilateral   salpingo-oophorectomy   Injection for sentinel lymph node mapping     COMPLICATIONS: None     ESTIMATED BLOOD LOSS: 25 cc     ANESTHESIA: GETA     INTRAOPERATIVE FINDINGS:  Enlarged uterus. Colonic epiploica adherent to anterior uterus.  No lymph node mapping on either side. Normal appearing bilateral ovaries.         PROCEDURE IN DETAIL: Informed consent was obtained and the patient was taken to   the Operating Suite.  General anesthesia was administered.  Once felt to be   adequate, she was placed in dorsal lithotomy position with her arms tucked.  The   abdomen and pelvis were prepped and draped in the usual fashion.  A Vera catheter was placed to gravity drainage and a speculum was placed in the vagina.  The cervix was visualized, grasped with a single-tooth tenaculum. ICG was injected into the cervix bilaterally at 3 and 9 o'clock.  Serial dilation of cervix was performed and a  VCare manipulator was placed without difficulty.   Attention was turned to the abdominal portion of the procedure. An 8 mm supra umbilical incision was made and a Veress needle was placed in the peritoneal cavity, confirmed by low opening pressure.  Pneumoperitoneum was obtained with carbon dioxide up to 15 mmHg and a robotic trocar was placed through through the incision.  Intraperitoneal placement was confirmed with the camera.    Three additional robotic trocars were placed in the right and left mid clavicular lines and the left mid axillary line. A 5mm AirSeal port was placed in the right mid axillary line. All ports were placed under direct visualization. Abdominal survey  revealed findings as above.  The patient was placed in steep Trendelenburg and the bowel was retracted out of the pelvis.     The robot was docked and instruments were passed in the operative field.      Attention was turned to the left side side. Anatomy was restored with lysis of adhesions. The retroperitoneum was opened parallel to the infundibulopelvic ligament. There paravesical and pararectal spaces were opened. Lymph channels were noted, but no discernible sentinel lymph node. Evaluation limited by abundant retroperitoneal fat. On the contralateral side, There paravesical and pararectal spaces were opened. Lymph channels were noted, but no discernible sentinel lymph node. Evaluation limited by abundant retroperitoneal fat. Lymph node biopsy was omitted.     The ureters were identified and a windows were created inferior to the ovarian vessels, ensuring that the ureters were out of the field of dissection. The ovarian vessels were cauterized and transected.     The posterior leaves of the broad ligament were dissected down to the level of the cup. The bilateral round ligaments and anterior leaves of the broad ligament were transected and the vesicouterine peritoneum was incised. The bladder was reflected down below the level of the cup and an anterior colopotomy was created. The uterine vessels were further skeletonized, cauterized, and transected, followed by cauterization and transection of the remaining cardinal ligaments. The colpotomy was extended circumferentially and the specimen was removed through the vagina in a bag.     The cuff was then closed with a 2-0 V Lock suture.  The pelvis was irrigated and Mady was applied to the pelvis. A final survey was done of the abdomen and pelvis.  There was no active bleeding and the integrity of the bowel, bladder, and other visible organs and tissues was again confirmed. Once hemostasis was confirmed, the instruments were removed, the robot was undocked and the  pneumoperitoneum was evacuated.  The patient was flattened.  All ports were removed and port sites were inspected and made hemostatic with electrocautery and closed with subcuticular 4-0 Monocryl suture and dermabond. The patient was awoken and taken to Recovery Room in stable condition.  I was present for and performed all key aspects of procedure.      Due to persistent difficulty maintaining safe visibility and significant effort to revisualize key structures often throughout the surgery, due to abundant retroperitoneal fat and pelvic adhesions this procedure took approximately twice as long as normal.      Gay Rios's expertise was needed as there was no qualified   resident available.    Jt Boston MD

## 2025-01-08 NOTE — INTERVAL H&P NOTE
Radha Mike is 59 y.o.  presenting for scheduled RA-TLH/BSO, SLND.    Temp:  [97.6 °F (36.4 °C)] 97.6 °F (36.4 °C)  Pulse:  [81] 81  Resp:  [18] 18  SpO2:  [97 %] 97 %  BP: (166)/(75) 166/75    General: NAD, alert, oriented, cooperative  HEENT: NCAT, EOM grossly intact  Lungs: Normal WOB  Heart: regular rate  Abdomen: soft, nondistended, nontender, no rebound or guarding    All questions answered and concerns addressed. To OR for planned procedure.     Nixon Miller MD  Obstetrics and Gynecology- PGY2

## 2025-01-08 NOTE — ANESTHESIA PROCEDURE NOTES
Intubation    Date/Time: 1/8/2025 3:49 PM    Performed by: Ayan Mckeon CRNA  Authorized by: Alfredo Deras MD    Intubation:     Induction:  Rapid sequence induction    Intubated:  Postinduction    Mask Ventilation:  Not attempted    Attempts:  1    Attempted By:  CRNA    Method of Intubation:  Video laryngoscopy    Blade:  Morton 4    Laryngeal View Grade: Grade I - full view of cords      Difficult Airway Encountered?: No      Complications:  None    Airway Device:  Oral endotracheal tube    Airway Device Size:  8.0    Style/Cuff Inflation:  Cuffed (inflated to minimal occlusive pressure)    Tube secured:  22    Secured at:  The lips    Placement Verified By:  Capnometry    Complicating Factors:  None    Findings Post-Intubation:  BS equal bilateral and atraumatic/condition of teeth unchanged

## 2025-01-08 NOTE — DISCHARGE SUMMARY
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    Estimated Blood Loss: Minimal, <10cc         Specimens:   Specimen (24h ago, onward)       Start     Ordered    01/08/25 1705  Specimen to Pathology, Surgery Gynecology and Obstetrics  Once        Comments: Pre-op Diagnosis: Complex atypical endometrial hyperplasia [N85.02]Abnormal uterine bleeding (AUB) [N93.9]Uterine fibroid [D25.9]Procedure(s):XI ROBOTIC HYSTERECTOMYXI ROBOTIC SALPINGO-OOPHORECTOMYMAPPING, LYMPH NODE, SENTINELBIOPSY, LYMPH NODE Number of specimens: 1Name of specimens: 1) uterus, cervix, bilateral tubes and ovaries     References:    Click here for ordering Quick Tip   Question Answer Comment   Procedure Type: Gynecology and Obstetrics    Specimen Class: Known or suspected malignancy    Release to patient Immediate        01/08/25 1746                    Discharge Provider: Mignon Duran    Diagnoses:  Active Hospital Problems    Diagnosis  POA    S/p RA-TLH/BSO/SLND [Z90.710]  No      Resolved Hospital Problems   No resolved problems to  display.       Discharged Condition: good    Hospital Course:   Patient was admitted for outpatient procedure as above, and tolerated the procedure well with no complications. Please see operative report for further details. Following the procedure, the patient was awakened from anesthesia and transferred to the recovery area in stable condition. She was discharged to home once ambulating, voiding, tolerating PO intake, and pain was well-controlled. Patient was given routine post-op instructions and prescriptions for pain medication to take as needed. Patient instructed to follow up with Dr. Boston as scheduled.    Final Diagnoses: Same as principal problem.    Disposition: Home or Self Care    Follow up/Patient Instructions:    Medications:  Reconciled Home Medications:      Medication List        START taking these medications      acetaminophen 650 MG Tbsr  Commonly known as: TYLENOL  Take 1 tablet (650 mg total) by mouth every 6 (six) hours as needed.     ibuprofen 600 MG tablet  Commonly known as: ADVIL,MOTRIN  Take 1 tablet (600 mg total) by mouth every 6 (six) hours as needed.     oxyCODONE 5 MG immediate release tablet  Commonly known as: ROXICODONE  Take 1 tablet (5 mg total) by mouth every 4 (four) hours as needed for Pain.     STIMULANT LAXATIVE PLUS 8.6-50 mg per tablet  Generic drug: senna-docusate 8.6-50 mg  Take 1 tablet by mouth daily as needed.            CONTINUE taking these medications      SYNTHROID 150 mcg tablet  Generic drug: levothyroxine  Take 1 tablet (150 mcg total) by mouth before breakfast.            Discharge Procedure Orders   Diet general     Lifting restrictions   Order Comments: No lifting greater than 15 pounds for six weeks.     Other restrictions (specify):   Order Comments: PELVIC REST (IF YOU HAD A HYSTERECTOMY):  No douching, tampons, or intercourse for 6 weeks.    If prescribed, vaginal estrogen cream may be used during the postoperative period.     DRIVING:  No driving  while on narcotics. Driving may be resumed initially with a competent passenger one to two weeks after surgery if no longer taking narcotics.     EXERCISE:  For six weeks your exercise should be limited to walking. You may walk as far as you wish, as long as you increase your level of exertion gradually and avoid slippery surfaces. You may climb stairs as needed to get around, but should not use stair climbing for exercise.     Remove dressing in 24 hours   Order Comments: If you have a bandage on wound, you may remove it the day after dismissal.  If you had steri-strips remove them once they begin to peel off (usually 2 weeks).  If your steri-strips still haven't come off in 2 weeks, please remove them.  Keep incision clean and dry.  Inspect the incision daily for signs and symptoms of infection.     Wound care routine (specify)   Order Comments: WOUND CARE:  If you have a band-aid or bandage on your wound, you may remove it the day after dismissal.  You may wash the wound with mild soap and water.   You may shower at any time but should avoid immersing any abdominal incisions in water for at least two weeks after surgery or until the wound is completely healed. If given, please shower with Hibiclens soap until bottle is completely finished. Keep your wound clean and dry.  You should observe your incision for signs of infection which include redness, warmth, drainage or fever.     Call MD for:  temperature >100.4     Call MD for:  persistent nausea and vomiting     Call MD for:  severe uncontrolled pain     Call MD for:  difficulty breathing, headache or visual disturbances     Call MD for:  redness, tenderness, or signs of infection (pain, swelling, redness, odor or green/yellow discharge around incision site)     Call MD for:  hives     Call MD for:   Order Comments: inability to void,urine is ketchup colored or you have large clots, vaginal bleeding is heavier than a period.    VAGINAL DISCHARGE: You may develop  a vaginal discharge and intermittent vaginal spotting after surgery and up to 6 weeks postoperatively.  The discharge may have an odor and may change in color but it is normal.  This is due to dissolving stiches.  Contact your surgical team if you develop vaginal or vulvar irritation along with a discharge.  Also contact your surgical team if you have vaginal discharge that smells like urine or stool.    CONSTIPATION REMEDIES: Patients are often constipated after surgery or with use of oral narcotic medicine. You should continue to take the stool softener, Senokot-S during the next six weeks, and consume adequate amounts of water.  If you have not had a bowel movement for 3 days after dismissal, or are uncomfortable and unable to pass stool, please try one or all of the following measures:  1.  Milk of Magnesia - 30 cc by mouth every 12 hours   2.  Dulcolax suppository - One suppository per rectum every 4-6 hours   3.  Metamucil, Fibercon or other bulk former - use as directed  4.  Fleets Enema      PAIN MEDICATIONS:     Take your pain medications as instructed. It is best to take pain medications before your pain becomes severe. This will allow you to take less medication yet have better pain relief. For the first 2 or 3 days it may be helpful to take your pain medications on a regular schedule (e.g. every 4 to 6 hours). This will help you to keep your pain under better control. You should then begin to take fewer medications each day until you no longer need them. Do not take pain medication on an empty stomach. This may lead to nausea and vomiting.     Activity as tolerated   Order Comments: PELVIC REST:  No douching, tampons, or intercourse for 6 weeks.    If prescribed, vaginal estrogen cream may be used during the postoperative period.     DRIVING:  No driving while on narcotics. Driving may be resumed initially with a competent passenger one to two weeks after surgery if no longer taking narcotics.      EXERCISE:  For six weeks your exercise should be limited to walking. You may walk as far as you wish, as long as you increase your level of exertion gradually and avoid slippery surfaces. You may climb stairs as needed to get around, but should not use stair climbing for exercise.     Shower on day dressing removed (No bath)   Order Comments: You may shower at any time but should avoid immersing any abdominal incisions in water for at least 2 weeks after surgery or until the wound is completely healed.  If given, please shower with Hibaclens soap until bottle is completely finish      Follow-up Information       Jt Boston MD Follow up on 1/23/2025.    Specialty: Gynecologic Oncology  Why: Post-op Visit  Contact information:  [address] [id number] [phone]                             Mignon Duran MD  [facility]   OBGYN PGY3

## 2025-01-08 NOTE — TRANSFER OF CARE
"Anesthesia Transfer of Care Note    Patient: Radha Mike    Procedure(s) Performed: Procedure(s) (LRB):  DV5 ROBOTIC HYSTERECTOMY,WITH SALPINGO-OOPHORECTOMY (Bilateral)  MAPPING, LYMPH NODE, SENTINEL (N/A)  INJECTION, FOR SENTINEL NODE IDENTIFICATION (Bilateral)    Patient location: PACU    Anesthesia Type: general    Transport from OR: Transported from OR on 6-10 L/min O2 by face mask with adequate spontaneous ventilation    Post pain: adequate analgesia    Post assessment: no apparent anesthetic complications and tolerated procedure well    Post vital signs: stable    Level of consciousness: awake    Nausea/Vomiting: no nausea/vomiting    Complications: none    Transfer of care protocol was followed      Last vitals: Visit Vitals  BP (!) 166/75 (BP Location: Left arm, Patient Position: Sitting)   Pulse 81   Temp 36.4 °C (97.6 °F) (Oral)   Resp 18   Ht 5' 7" (1.702 m)   Wt (!) 171.5 kg (378 lb)   LMP 05/25/2017 (Approximate)   SpO2 97%   Breastfeeding No   BMI 59.20 kg/m²     "

## 2025-01-09 VITALS
BODY MASS INDEX: 45.99 KG/M2 | DIASTOLIC BLOOD PRESSURE: 68 MMHG | HEIGHT: 67 IN | WEIGHT: 293 LBS | OXYGEN SATURATION: 98 % | HEART RATE: 78 BPM | SYSTOLIC BLOOD PRESSURE: 133 MMHG | RESPIRATION RATE: 18 BRPM | TEMPERATURE: 98 F

## 2025-01-09 NOTE — ANESTHESIA POSTPROCEDURE EVALUATION
Anesthesia Post Evaluation    Patient: Radha Mike    Procedure(s) Performed: Procedure(s) (LRB):  DV5 ROBOTIC HYSTERECTOMY,WITH SALPINGO-OOPHORECTOMY (Bilateral)  MAPPING, LYMPH NODE, SENTINEL (N/A)  INJECTION, FOR SENTINEL NODE IDENTIFICATION (Bilateral)    Final Anesthesia Type: general      Patient location during evaluation: PACU  Patient participation: Yes- Able to Participate  Level of consciousness: awake and alert  Post-procedure vital signs: reviewed and stable  Pain management: adequate  Airway patency: patent  CLEMENTINE mitigation strategies: Extubation while patient is awake  PONV status at discharge: No PONV  Anesthetic complications: no      Cardiovascular status: hemodynamically stable  Respiratory status: unassisted  Hydration status: euvolemic  Follow-up not needed.              Vitals Value Taken Time   /57 01/08/25 1816   Temp 36.2 °C (97.1 °F) 01/08/25 1750   Pulse 84 01/08/25 1820   Resp 18 01/08/25 1819   SpO2 94 % 01/08/25 1820   Vitals shown include unfiled device data.      No case tracking events are documented in the log.      Pain/Zheng Score: Pain Rating Prior to Med Admin: 8 (1/8/2025  6:19 PM)  Zheng Score: 8 (1/8/2025  5:50 PM)

## 2025-01-09 NOTE — PLAN OF CARE
Radha Mike has met all discharge criteria from Phase II. Vital Signs are stable, ambulating  without difficulty. Discharge instructions given, patient verbalized understanding. Discharged from facility via wheelchair in stable condition.

## 2025-01-09 NOTE — OPERATIVE NOTE ADDENDUM
Certification of Assistant at Surgery       Surgery Date: 1/8/2025     Participating Surgeons:  Surgeons and Role:     * Jt Boston MD - Primary     * Mignon Duran MD - Resident - Assisting     * Nixon Rojas MD - Resident - Assisting    Procedures:  Procedure(s) (LRB):  DV5 ROBOTIC HYSTERECTOMY,WITH SALPINGO-OOPHORECTOMY (Bilateral)  MAPPING, LYMPH NODE, SENTINEL (N/A)  INJECTION, FOR SENTINEL NODE IDENTIFICATION (Bilateral)    Assistant Surgeon's Certification of Necessity:  I understand that section 1842 (b) (6) (d) of the Social Security Act generally prohibits Medicare Part B reasonable charge payment for the services of assistants at surgery in teaching hospitals when qualified residents are available to furnish such services. I certify that the services for which payment is claimed were medically necessary, and that no qualified resident was available to perform the services. I further understand that these services are subject to post-payment review by the Medicare carrier.      RUBEN Damon    01/08/2025  6:10 PM

## 2025-01-14 ENCOUNTER — TELEPHONE (OUTPATIENT)
Dept: GYNECOLOGIC ONCOLOGY | Facility: CLINIC | Age: 60
End: 2025-01-14
Payer: COMMERCIAL

## 2025-01-14 LAB
COMMENT: ABNORMAL
FINAL PATHOLOGIC DIAGNOSIS: ABNORMAL
GROSS: ABNORMAL
Lab: ABNORMAL
MICROSCOPIC EXAM: ABNORMAL

## 2025-01-15 DIAGNOSIS — E03.9 ACQUIRED HYPOTHYROIDISM: ICD-10-CM

## 2025-01-15 PROBLEM — D39.12 OVARIAN TUMOR OF BORDERLINE MALIGNANCY, LEFT: Status: ACTIVE | Noted: 2025-01-15

## 2025-01-16 RX ORDER — LEVOTHYROXINE SODIUM 175 UG/1
175 TABLET ORAL
Qty: 90 TABLET | Refills: 1 | Status: SHIPPED | OUTPATIENT
Start: 2025-01-16

## 2025-01-16 NOTE — TELEPHONE ENCOUNTER
No care due was identified.  [facility] Embedded Care Due Messages. Reference number: [id number].   1/15/2025 7:52:12 PM CST

## 2025-01-16 NOTE — TELEPHONE ENCOUNTER
Refill Routing Note   Medication(s) are not appropriate for processing by [facility] for the following reason(s):        No active prescription written by provider    [facility] action(s):  Defer      Medication Therapy Plan: The original prescription was discontinued on 6/26/2024 by Jose Brito MD.      Appointments  past 12m or future 3m with PCP    Date Provider   Last Visit   6/26/2024 Jose Brito MD   Next Visit   Visit date not found Jose Brito MD   ED visits in past 90 days: 0        Note composed:12:18 AM 01/16/2025

## 2025-01-29 NOTE — PROGRESS NOTES
Referring Provider:  Luly He MD  [address] [place] [id number]   Subjective:      Patient ID: Radha Mike is a 59 y.o. female.    Chief Complaint: Post-op Evaluation (3 weeks post op)    Problem List Items Addressed This Visit       Ovarian tumor of borderline malignancy, left - Primary    Overview     1/8/25: -[facility] BSO. 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 Denies vaginal bleeding, nausea, abdominal pain. Here to review pathology.     Review of Systems   Constitutional:  Negative for chills, fatigue and fever.   Respiratory:  Negative for cough and shortness of breath.    Cardiovascular:  Negative for chest pain.   Gastrointestinal:  Negative for abdominal distention, abdominal pain, constipation and diarrhea.   Genitourinary:  Negative for dysuria, pelvic pain and vaginal bleeding.   Musculoskeletal:  Negative for back pain.   Psychiatric/Behavioral:  Negative for dysphoric mood. The patient is not nervous/anxious.      Past Medical History:   Diagnosis Date    Anemia     Arthritis     Psoriasis     Thyroid disease       Past Surgical History:   Procedure Laterality Date    ANKLE DEBRIDEMENT  2005    CHOLECYSTECTOMY  1988    COLONOSCOPY N/A 11/19/2024    Procedure: COLONOSCOPY;  Surgeon: Benigno Rosas MD;  Location: [address];  Service: Endoscopy;  Laterality: N/A;  8/20  BMI: 58.26  Referral: Jose Brito MD  [facility] portal and mailed  LW  11/11: precall complete EB  11/12-emailed instr to [email or web address]    D&C hysteroscopy  06/2017    DV5 ROBOTIC HYSTERECTOMY,WITH SALPINGO-OOPHORECTOMY Bilateral 1/8/2025    Procedure: DV5 ROBOTIC HYSTERECTOMY,WITH SALPINGO-OOPHORECTOMY;  Surgeon: Jt Boston MD;   Location: [facility] OR;  Service: Gynecology Oncology;  Laterality: Bilateral;    HYSTEROSCOPIC POLYPECTOMY OF UTERUS N/A 12/6/2024    Procedure: POLYPECTOMY, UTERUS, HYSTEROSCOPIC;  Surgeon: Luly He MD;  Location: [facility] OR;  Service: OB/GYN;  Laterality: N/A;    HYSTEROSCOPY WITH DILATION AND CURETTAGE OF UTERUS N/A 12/6/2024    Procedure: HYSTEROSCOPY, WITH DILATION AND CURETTAGE OF UTERUS;  Surgeon: Luly He MD;  Location: [facility] OR;  Service: OB/GYN;  Laterality: N/A;    INJECTION FOR SENTINEL NODE IDENTIFICATION Bilateral 1/8/2025    Procedure: INJECTION, FOR SENTINEL NODE IDENTIFICATION;  Surgeon: Jt Boston MD;  Location: [facility];  Service: Gynecology Oncology;  Laterality: Bilateral;    MAPPING, LYMPH NODE, SENTINEL N/A 1/8/2025    Procedure: MAPPING, LYMPH NODE, SENTINEL;  Surgeon: Jt Boston MD;  Location: [facility];  Service: Gynecology Oncology;  Laterality: N/A;    TUBAL LIGATION  1996      Family History   Problem Relation Name Age of Onset    Diabetes Father      Cancer Father          mouth    Stomach cancer Father      Diabetes Mother      Breast cancer Mother      Heart attack Mother      Heart disease Mother          CHF    No Known Problems Brother      Arthritis Sister      Diabetes Sister      No Known Problems Daughter      Hypothyroidism Son      Colon cancer Neg Hx      Ovarian cancer Neg Hx        Social History     Socioeconomic History    Marital status:         Objective:      Vitals:    01/30/25 1029   BP: 129/79   Pulse: 95   Temp: 98 °F (36.7 °C)        Physical Exam  Constitutional:       General: She is not in acute distress.  HENT:      Head: Normocephalic.   Eyes:      Extraocular Movements: Extraocular movements intact.      Conjunctiva/sclera: Conjunctivae normal.   Cardiovascular:      Rate and Rhythm: Normal rate.      Pulses: Normal pulses.   Pulmonary:      Effort: Pulmonary effort is normal. No respiratory distress.      Breath sounds: No  wheezing.   Abdominal:      General: There is no distension.      Tenderness: There is no abdominal tenderness. There is no guarding or rebound.      Comments: RUQ scar from cholecystectomy  Robotic incisions well healed   Genitourinary:     Comments: External genitalia normal. Vagina normal. Uterus, cervix, and adnexa surgically absent. Vaginal cuff intact. A female staff member was present for the exam.      Musculoskeletal:         General: No deformity.   Neurological:      Mental Status: She is alert and oriented to person, place, and time.   Psychiatric:         Mood and Affect: Mood normal.         Behavior: Behavior normal.         Thought Content: Thought content normal.         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 Reviewed pathology in detail. Extensive CAH noted within the uterus. Left ovary notable for 2.3 cm borderline tumor. We reviewed that the prognosis of borderline tumors is excellent and that there is a low risk of recurrence. Therefore I do not recommend adjuvant chemotherapy. Discussed that recurrences can be either borderline or in some cases malignant. We reviewed the risks and benefits of surgical staging. Given staging is unlikely to alter management, I recommend against this. While there was no indication for a preop CA-125, this could be a helpful adjunct for our surveillance in the future. Plan for surveillance visits with exams, symptom assessment, and CA-125 every 6 months for the first year  and then yearly thereafter.  [facility] 6 months with CA-125  Tylenol and ibuprofen as needed for post op pain    2. Obesity: BMI 59. Counseled on importance of diet and exercise to achieve and maintain a healthy weight.     Though this visit took place within the global post op period, counseling today covered issues not related to the surgery, specifically the prognosis of her pathology and the pros and cons of adjuvant treatment including chemotherapy, radiation, or additional surgery.    Visit today is associated with current or anticipated ongoing medical care related to this patient's single serious condition/complex condition: ovarian borderline tumor.           Jt Boston MD

## 2025-01-30 ENCOUNTER — OFFICE VISIT (OUTPATIENT)
Dept: GYNECOLOGIC ONCOLOGY | Facility: CLINIC | Age: 60
End: 2025-01-30
Payer: COMMERCIAL

## 2025-01-30 VITALS
HEIGHT: 67 IN | DIASTOLIC BLOOD PRESSURE: 79 MMHG | HEART RATE: 95 BPM | OXYGEN SATURATION: 98 % | TEMPERATURE: 98 F | BODY MASS INDEX: 45.99 KG/M2 | WEIGHT: 293 LBS | SYSTOLIC BLOOD PRESSURE: 129 MMHG

## 2025-01-30 DIAGNOSIS — E66.813 CLASS 3 SEVERE OBESITY DUE TO EXCESS CALORIES WITHOUT SERIOUS COMORBIDITY WITH BODY MASS INDEX (BMI) OF 50.0 TO 59.9 IN ADULT: ICD-10-CM

## 2025-01-30 DIAGNOSIS — E66.01 CLASS 3 SEVERE OBESITY DUE TO EXCESS CALORIES WITHOUT SERIOUS COMORBIDITY WITH BODY MASS INDEX (BMI) OF 50.0 TO 59.9 IN ADULT: ICD-10-CM

## 2025-01-30 DIAGNOSIS — N85.02 COMPLEX ATYPICAL ENDOMETRIAL HYPERPLASIA: Primary | ICD-10-CM

## 2025-01-30 DIAGNOSIS — D39.12 OVARIAN TUMOR OF BORDERLINE MALIGNANCY, LEFT: Primary | ICD-10-CM

## 2025-01-30 PROCEDURE — 3078F DIAST BP <80 MM HG: CPT | Mod: CPTII,S$GLB,, | Performed by: STUDENT IN AN ORGANIZED HEALTH CARE EDUCATION/TRAINING PROGRAM

## 2025-01-30 PROCEDURE — 99213 OFFICE O/P EST LOW 20 MIN: CPT | Mod: 24,S$GLB,, | Performed by: STUDENT IN AN ORGANIZED HEALTH CARE EDUCATION/TRAINING PROGRAM

## 2025-01-30 PROCEDURE — 99999 PR PBB SHADOW E&M-EST. PATIENT-LVL III: CPT | Mod: PBBFAC,,, | Performed by: STUDENT IN AN ORGANIZED HEALTH CARE EDUCATION/TRAINING PROGRAM

## 2025-01-30 PROCEDURE — 3008F BODY MASS INDEX DOCD: CPT | Mod: CPTII,S$GLB,, | Performed by: STUDENT IN AN ORGANIZED HEALTH CARE EDUCATION/TRAINING PROGRAM

## 2025-01-30 PROCEDURE — G2211 COMPLEX E/M VISIT ADD ON: HCPCS | Mod: S$GLB,,, | Performed by: STUDENT IN AN ORGANIZED HEALTH CARE EDUCATION/TRAINING PROGRAM

## 2025-01-30 PROCEDURE — 3074F SYST BP LT 130 MM HG: CPT | Mod: CPTII,S$GLB,, | Performed by: STUDENT IN AN ORGANIZED HEALTH CARE EDUCATION/TRAINING PROGRAM

## 2025-01-30 NOTE — Clinical Note
Luly, thanks so much for sending Radha to see me. She is such a sweet lady! I'm thrilled that she is recovering well from surgery. Path showed CAH and incidental finding of a IA endometrioid borderline tumor. I'll continue to follow her for surveillance visits.   Thanks again, and reach out anytime. Jt

## 2025-04-15 ENCOUNTER — TELEPHONE (OUTPATIENT)
Dept: FAMILY MEDICINE | Facility: CLINIC | Age: 60
End: 2025-04-15
Payer: COMMERCIAL

## 2025-04-15 DIAGNOSIS — E03.9 ACQUIRED HYPOTHYROIDISM: ICD-10-CM

## 2025-04-15 RX ORDER — LEVOTHYROXINE SODIUM 175 UG/1
175 TABLET ORAL DAILY
Qty: 90 TABLET | Refills: 1 | Status: SHIPPED | OUTPATIENT
Start: 2025-04-15

## 2025-04-15 NOTE — TELEPHONE ENCOUNTER
----- Message from Anand sent at 4/15/2025  1:02 PM CDT -----  .Type:  RX Refill Request    Who Called: pt    Refill or New Rx:refill    RX Name and Strength:SYNTHROID 175 mcg tablet    How is the patient currently taking it? (ex. 1XDay):TAKE 1 TABLET(175 MCG) BY MOUTH EVERY DAY - Oral    Is this a 30 day or 90 day RX:90 tablet    Preferred Pharmacy with phone number:[id number] - [place] [id number]  [address] [facility]    Local or Mail Order:local    Ordering Provider:Alisha    Would the patient rather a call back or a response via [facility]? Call back    Best Call Back Number:[phone] Information:

## 2025-04-16 DIAGNOSIS — E03.9 ACQUIRED HYPOTHYROIDISM: ICD-10-CM

## 2025-04-16 NOTE — TELEPHONE ENCOUNTER
Care Due:                  Date            Visit Type   Department     Provider  --------------------------------------------------------------------------------                                EP -                              PRIMARY      [facility] FAMILY  Last Visit: 06-      CARE ([facility])   MEDICINE       Jose Brito  Next Visit: None Scheduled  None         None Found                                                            Last  Test          Frequency    Reason                     Performed    Due Date  --------------------------------------------------------------------------------    TSH.........  12 months..  SYNTHROID................  06- 06-    [facility] Embedded Care Due Messages. Reference number: [id number].   4/16/2025 9:51:21 AM CDT

## 2025-04-20 RX ORDER — LEVOTHYROXINE SODIUM 175 UG/1
175 TABLET ORAL DAILY
Qty: 90 TABLET | Refills: 1 | Status: SHIPPED | OUTPATIENT
Start: 2025-04-20

## 2025-07-09 DIAGNOSIS — R73.03 PREDIABETES: ICD-10-CM

## 2025-07-17 ENCOUNTER — TELEPHONE (OUTPATIENT)
Dept: FAMILY MEDICINE | Facility: CLINIC | Age: 60
End: 2025-07-17
Payer: COMMERCIAL

## 2025-07-17 DIAGNOSIS — E03.9 ACQUIRED HYPOTHYROIDISM: Primary | ICD-10-CM

## 2025-07-17 DIAGNOSIS — R73.9 HYPERGLYCEMIA: ICD-10-CM

## 2025-07-17 DIAGNOSIS — Z13.6 SCREENING FOR CARDIOVASCULAR CONDITION: ICD-10-CM

## 2025-07-17 DIAGNOSIS — G47.19 EXCESSIVE DAYTIME SLEEPINESS: ICD-10-CM

## 2025-07-17 DIAGNOSIS — G47.33 OSA (OBSTRUCTIVE SLEEP APNEA): ICD-10-CM

## 2025-07-17 NOTE — TELEPHONE ENCOUNTER
Copied from CRM #[id number]. Topic: Appointments - Appointment Access  >> Jul 17, 2025 11:05 AM Arben wrote:  Type:  Sooner Apoointment Request    Caller is requesting a sooner appointment.  Caller took first available appointment listed below.  Caller will accept being placed on the waitlist and is requesting a message be sent to doctor.  Name of Caller:Pt  When is the first available appointment?08/04/2025  Symptoms:medication refill and blood work  Would the patient rather a call back or a response via [facility]? Call back  Best Call Back Number:[phone]  Additional Information: pt would like to have the blood drawn before the appointment

## 2025-07-17 NOTE — TELEPHONE ENCOUNTER
Pt is scheduled to see you in August. She has an outstanding A1C order. Do you want to order any additional labs?   oral

## 2025-07-31 ENCOUNTER — TELEPHONE (OUTPATIENT)
Dept: GYNECOLOGIC ONCOLOGY | Facility: CLINIC | Age: 60
End: 2025-07-31
Payer: COMMERCIAL

## 2025-08-01 ENCOUNTER — LAB VISIT (OUTPATIENT)
Dept: LAB | Facility: HOSPITAL | Age: 60
End: 2025-08-01
Attending: STUDENT IN AN ORGANIZED HEALTH CARE EDUCATION/TRAINING PROGRAM
Payer: COMMERCIAL

## 2025-08-01 DIAGNOSIS — R73.03 PREDIABETES: ICD-10-CM

## 2025-08-01 DIAGNOSIS — R73.9 HYPERGLYCEMIA: ICD-10-CM

## 2025-08-01 DIAGNOSIS — G47.19 EXCESSIVE DAYTIME SLEEPINESS: ICD-10-CM

## 2025-08-01 DIAGNOSIS — Z13.6 SCREENING FOR CARDIOVASCULAR CONDITION: ICD-10-CM

## 2025-08-01 DIAGNOSIS — N85.02 COMPLEX ATYPICAL ENDOMETRIAL HYPERPLASIA: ICD-10-CM

## 2025-08-01 LAB
ABSOLUTE EOSINOPHIL (OHS): 0.33 K/UL
ABSOLUTE MONOCYTE (OHS): 0.77 K/UL (ref 0.3–1)
ABSOLUTE NEUTROPHIL COUNT (OHS): 7.17 K/UL (ref 1.8–7.7)
ALBUMIN SERPL BCP-MCNC: 3.9 G/DL (ref 3.5–5.2)
ALP SERPL-CCNC: 84 UNIT/L (ref 38–126)
ALT SERPL W/O P-5'-P-CCNC: 13 UNIT/L (ref 10–44)
ANION GAP (OHS): 7 MMOL/L (ref 8–16)
AST SERPL-CCNC: 19 UNIT/L (ref 15–46)
BASOPHILS # BLD AUTO: 0.04 K/UL
BASOPHILS NFR BLD AUTO: 0.4 %
BILIRUB SERPL-MCNC: 0.7 MG/DL (ref 0.1–1)
BUN SERPL-MCNC: 12 MG/DL (ref 7–17)
CALCIUM SERPL-MCNC: 9.1 MG/DL (ref 8.7–10.5)
CANCER AG125 SERPL-ACNC: 4 UNIT/ML
CHLORIDE SERPL-SCNC: 103 MMOL/L (ref 95–110)
CHOLEST SERPL-MCNC: 126 MG/DL (ref 120–199)
CHOLEST/HDLC SERPL: 3.1 {RATIO} (ref 2–5)
CO2 SERPL-SCNC: 28 MMOL/L (ref 23–29)
CREAT SERPL-MCNC: 0.7 MG/DL (ref 0.5–1.4)
EAG (OHS): 128 MG/DL (ref 68–131)
ERYTHROCYTE [DISTWIDTH] IN BLOOD BY AUTOMATED COUNT: 15.8 % (ref 11.5–14.5)
GFR SERPLBLD CREATININE-BSD FMLA CKD-EPI: >60 ML/MIN/1.73/M2
GLUCOSE SERPL-MCNC: 109 MG/DL (ref 70–110)
HBA1C MFR BLD: 6.1 % (ref 4–5.6)
HCT VFR BLD AUTO: 39.1 % (ref 37–48.5)
HDLC SERPL-MCNC: 41 MG/DL (ref 40–75)
HDLC SERPL: 32.5 % (ref 20–50)
HGB BLD-MCNC: 12.2 GM/DL (ref 12–16)
IMM GRANULOCYTES # BLD AUTO: 0.07 K/UL (ref 0–0.04)
IMM GRANULOCYTES NFR BLD AUTO: 0.7 % (ref 0–0.5)
LDLC SERPL CALC-MCNC: 75.8 MG/DL (ref 63–159)
LYMPHOCYTES # BLD AUTO: 2.23 K/UL (ref 1–4.8)
MCH RBC QN AUTO: 22.8 PG (ref 27–31)
MCHC RBC AUTO-ENTMCNC: 31.2 G/DL (ref 32–36)
MCV RBC AUTO: 73 FL (ref 82–98)
NONHDLC SERPL-MCNC: 85 MG/DL
NUCLEATED RBC (/100WBC) (OHS): 0 /100 WBC
PLATELET # BLD AUTO: 385 K/UL (ref 150–450)
PMV BLD AUTO: 11.2 FL (ref 9.2–12.9)
POTASSIUM SERPL-SCNC: 3.8 MMOL/L (ref 3.5–5.1)
PROT SERPL-MCNC: 7.7 GM/DL (ref 6–8.4)
RBC # BLD AUTO: 5.34 M/UL (ref 4–5.4)
RELATIVE EOSINOPHIL (OHS): 3.1 %
RELATIVE LYMPHOCYTE (OHS): 21 % (ref 18–48)
RELATIVE MONOCYTE (OHS): 7.3 % (ref 4–15)
RELATIVE NEUTROPHIL (OHS): 67.5 % (ref 38–73)
SODIUM SERPL-SCNC: 138 MMOL/L (ref 136–145)
T4 FREE SERPL-MCNC: 1.68 NG/DL (ref 0.71–1.51)
TRIGL SERPL-MCNC: 46 MG/DL (ref 30–150)
TSH SERPL-ACNC: <0.026 UIU/ML (ref 0.4–4)
WBC # BLD AUTO: 10.61 K/UL (ref 3.9–12.7)

## 2025-08-01 PROCEDURE — 36415 COLL VENOUS BLD VENIPUNCTURE: CPT | Mod: PN

## 2025-08-01 PROCEDURE — 86304 IMMUNOASSAY TUMOR CA 125: CPT | Mod: PN

## 2025-08-01 PROCEDURE — 82465 ASSAY BLD/SERUM CHOLESTEROL: CPT | Mod: PN

## 2025-08-01 PROCEDURE — 83036 HEMOGLOBIN GLYCOSYLATED A1C: CPT | Mod: PN

## 2025-08-01 PROCEDURE — 80053 COMPREHEN METABOLIC PANEL: CPT | Mod: PN

## 2025-08-01 PROCEDURE — 84439 ASSAY OF FREE THYROXINE: CPT | Mod: PN

## 2025-08-01 PROCEDURE — 84443 ASSAY THYROID STIM HORMONE: CPT | Mod: PN

## 2025-08-01 PROCEDURE — 85025 COMPLETE CBC W/AUTO DIFF WBC: CPT | Mod: PN

## 2025-08-04 ENCOUNTER — OFFICE VISIT (OUTPATIENT)
Dept: FAMILY MEDICINE | Facility: CLINIC | Age: 60
End: 2025-08-04
Payer: COMMERCIAL

## 2025-08-04 ENCOUNTER — PATIENT MESSAGE (OUTPATIENT)
Dept: FAMILY MEDICINE | Facility: CLINIC | Age: 60
End: 2025-08-04

## 2025-08-04 VITALS
TEMPERATURE: 98 F | SYSTOLIC BLOOD PRESSURE: 136 MMHG | OXYGEN SATURATION: 98 % | DIASTOLIC BLOOD PRESSURE: 76 MMHG | BODY MASS INDEX: 45.99 KG/M2 | WEIGHT: 293 LBS | HEIGHT: 67 IN | HEART RATE: 108 BPM

## 2025-08-04 DIAGNOSIS — Z00.00 WELLNESS EXAMINATION: Primary | ICD-10-CM

## 2025-08-04 DIAGNOSIS — Z12.31 VISIT FOR SCREENING MAMMOGRAM: ICD-10-CM

## 2025-08-04 DIAGNOSIS — R73.03 PREDIABETES: ICD-10-CM

## 2025-08-04 DIAGNOSIS — E03.9 ACQUIRED HYPOTHYROIDISM: ICD-10-CM

## 2025-08-04 PROCEDURE — 99396 PREV VISIT EST AGE 40-64: CPT | Mod: S$GLB,,, | Performed by: STUDENT IN AN ORGANIZED HEALTH CARE EDUCATION/TRAINING PROGRAM

## 2025-08-04 PROCEDURE — 1160F RVW MEDS BY RX/DR IN RCRD: CPT | Mod: CPTII,S$GLB,, | Performed by: STUDENT IN AN ORGANIZED HEALTH CARE EDUCATION/TRAINING PROGRAM

## 2025-08-04 PROCEDURE — 3075F SYST BP GE 130 - 139MM HG: CPT | Mod: CPTII,S$GLB,, | Performed by: STUDENT IN AN ORGANIZED HEALTH CARE EDUCATION/TRAINING PROGRAM

## 2025-08-04 PROCEDURE — 3008F BODY MASS INDEX DOCD: CPT | Mod: CPTII,S$GLB,, | Performed by: STUDENT IN AN ORGANIZED HEALTH CARE EDUCATION/TRAINING PROGRAM

## 2025-08-04 PROCEDURE — 3044F HG A1C LEVEL LT 7.0%: CPT | Mod: CPTII,S$GLB,, | Performed by: STUDENT IN AN ORGANIZED HEALTH CARE EDUCATION/TRAINING PROGRAM

## 2025-08-04 PROCEDURE — 3078F DIAST BP <80 MM HG: CPT | Mod: CPTII,S$GLB,, | Performed by: STUDENT IN AN ORGANIZED HEALTH CARE EDUCATION/TRAINING PROGRAM

## 2025-08-04 PROCEDURE — 1159F MED LIST DOCD IN RCRD: CPT | Mod: CPTII,S$GLB,, | Performed by: STUDENT IN AN ORGANIZED HEALTH CARE EDUCATION/TRAINING PROGRAM

## 2025-08-04 RX ORDER — LEVOTHYROXINE SODIUM 150 UG/1
150 TABLET ORAL
Qty: 90 TABLET | Refills: 3 | Status: SHIPPED | OUTPATIENT
Start: 2025-08-04

## 2025-08-05 NOTE — PROGRESS NOTES
Patient ID: Radha Mike is a 60 y.o. female.     Chief Complaint: Annual Exam    HPI  History of Present Illness    Radha presents today for medication refill and annual wellness visit.    She continues Synthroid 175 mg and reports persistent sensation of feeling cold. She believes her thyroid condition may be impacting weight management. Her A1C is 6.1, increased from 6.0 last year, indicating prediabetes.    She reports persistent elevated blood pressure around 130 over the past few years, acknowledging a longstanding history of slightly elevated blood pressure.    She recently underwent a total hysterectomy by Dr. Boston. Pathology report revealed an incidental tumor located on the outside of the ovary, which was not visualized during the procedure. She is scheduled for post-operative follow-up with Dr. Boston in one month.    She follows an anti-inflammatory diet, eliminating refined foods, processed foods, seed oils, sugars, and white foods. She has replaced brown rice with quinoa and stopped consuming fried foods. These dietary modifications are motivated by thyroid health and prediabetes management.           Review of Systems  Review of Systems   Constitutional:  Negative for fever.   HENT:  Negative for ear pain and sinus pain.    Eyes:  Negative for discharge.   Respiratory:  Negative for cough and shortness of breath.    Cardiovascular:  Negative for chest pain and leg swelling.   Gastrointestinal:  Negative for diarrhea, nausea and vomiting.   Genitourinary:  Negative for urgency.   Musculoskeletal:  Negative for myalgias.   Skin:  Negative for rash.   Neurological:  Negative for weakness and headaches.   Psychiatric/Behavioral:  Negative for depression.    All other systems reviewed and are negative.      Currently Medications  Medications Ordered Prior to Encounter[1]    Physical  Exam  Vitals:    08/04/25 1422   BP: 136/76   BP Location: Right arm   Pulse: 108   Temp: 98.1 °F (36.7  "°C)   SpO2: 98%   Weight: (!) 163.1 kg (359 lb 9.1 oz)   Height: 5' 7" (1.702 m)      Body mass index is 56.32 kg/m².  Wt Readings from Last 3 Encounters:   08/04/25 (!) 163.1 kg (359 lb 9.1 oz)   01/30/25 (!) 171 kg (376 lb 15.8 oz)   01/08/25 (!) 171.5 kg (378 lb)       Physical Exam  Vitals and nursing note reviewed.   Constitutional:       General: She is not in acute distress.     Appearance: She is not ill-appearing.   HENT:      Head: Normocephalic and atraumatic.      Right Ear: External ear normal.      Left Ear: External ear normal.      Nose: Nose normal.      Mouth/Throat:      Mouth: Mucous membranes are moist.   Eyes:      Extraocular Movements: Extraocular movements intact.      Conjunctiva/sclera: Conjunctivae normal.   Cardiovascular:      Rate and Rhythm: Normal rate and regular rhythm.      Pulses: Normal pulses.      Heart sounds: No murmur heard.  Pulmonary:      Effort: Pulmonary effort is normal. No respiratory distress.      Breath sounds: No wheezing.   Abdominal:      General: There is no distension.      Palpations: Abdomen is soft. There is no mass.      Tenderness: There is no abdominal tenderness.   Musculoskeletal:         General: No swelling.      Cervical back: Normal range of motion.   Skin:     Coloration: Skin is not jaundiced.      Findings: No rash.   Neurological:      General: No focal deficit present.      Mental Status: She is alert and oriented to person, place, and time.   Psychiatric:         Mood and Affect: Mood normal.         Thought Content: Thought content normal.         Labs:    Complete Blood Count  Lab Results   Component Value Date    RBC 5.34 08/01/2025    HGB 12.2 08/01/2025    HCT 39.1 08/01/2025    MCV 73 (L) 08/01/2025    MCH 22.8 (L) 08/01/2025    MCHC 31.2 (L) 08/01/2025    RDW 15.8 (H) 08/01/2025     08/01/2025    MPV 11.2 08/01/2025    GRAN 6.5 01/02/2025    GRAN 66.4 01/02/2025    LYMPH 21.0 08/01/2025    LYMPH 2.23 08/01/2025    MONO 7.3 " 08/01/2025    MONO 0.77 08/01/2025    EOS 3.1 08/01/2025    EOS 0.33 08/01/2025    BASO 0.04 01/02/2025    EOSINOPHIL 4.8 01/02/2025    BASOPHIL 0.4 08/01/2025    BASOPHIL 0.04 08/01/2025    DIFFMETHOD Automated 01/02/2025       Comprehensive Metabolic Panel  Lab Results   Component Value Date     08/01/2025    BUN 12 08/01/2025    CREATININE 0.7 08/01/2025     08/01/2025    K 3.8 08/01/2025     08/01/2025    PROT 7.7 08/01/2025    ALBUMIN 3.9 08/01/2025    BILITOT 0.7 08/01/2025    AST 19 08/01/2025    ALKPHOS 84 08/01/2025    CO2 28 08/01/2025    ALT 13 08/01/2025    ANIONGAP 7 (L) 08/01/2025       TSH  Lab Results   Component Value Date    TSH <0.026 (L) 08/01/2025       Imaging:  US Pelvis Comp with Transvag NON-OB (xpd  Narrative: EXAMINATION:  US PELVIS COMP WITH TRANSVAGINAL NON-OB (XPD)    CLINICAL HISTORY:  Postmenopausal bleeding    COMPARISON:  November 14, 2022    FINDINGS:  The uterus measures 12.0 x 6.8 x 6.6 cm. The uterine volume is 279 cc. Endometrial stripe measures 9 mm.  Multiple fibroids measuring up to 4.4 cm.  Negative for other endometrial or myometrial abnormalities. The visualized portions of the cervix, vagina and urinary bladder are normal.  Negative for free fluid in the cul-de-sac.    Neither ovary was visualized transabdominal E or transvaginally.  Negative for adnexal masses.  Impression: 1. Neither ovary was seen transabdominally or transvaginally.  Negative for adnexal masses.    2.  Enlarged fibroid uterus, with interval increase in size of fibroids.  The largest currently measures 4.4 cm in the posterior fundus.    3.  Otherwise, unremarkable pelvic ultrasound.    Insert over read 0    Electronically signed by: Reji Conteh MD  Date:    09/24/2024  Time:    12:37      Assessment/Plan:  Assessment & Plan    Assessed thyroid function and adjusted medication dosage due to weight loss and potential overactive thyroid state.  Evaluated prediabetes with HbA1c of 6.1,  slightly increased from previous year.  Considered borderline BP of 130, not yet requiring medication intervention.  Acknowledged recent GYN surgery and need for cancer cell monitoring.  Reviewed interest in anti-inflammatory diet for thyroid health.  Explained thyroid antibody testing is not routinely performed as it does not , and discussed the nature of thyroid antibodies and their presence in thyroid conditions.  Researched and discussed T2 thyroid hormone supplement, noting it is not typically prescribed in conventional medicine and discussed the lack of FDA monitoring for OTC thyroid supplements.    HYPOTHYROIDISM:  - Decreased Synthroid from 175 mcg to 150 mcg daily due to high thyroid levels indicating overactivity.  - This adjustment accounts for the patient's recent weight loss, which affects thyroid medication needs.  - Refilled prescription for 3 months and sent to [facility].  - Placed standing order for thyroid lab test, valid for the rest of the year.  - Discussed with patient that thyroid antibodies are irrelevant to current management.  - Advised following an anti-inflammatory diet to help with thyroid improvement.  - Radha to follow up in 8 weeks, or whenever ready, for thyroid labs recheck using the standing order.    PREDIABETES:  - Noted the patient's HbA1c has increased from 6.0 to 6.1, indicating progression towards diabetes.  - Provided information on diabetic diet and meal planning to prevent progression.  - Advised watching carbohydrate intake, particularly bread, pasta, and potatoes.  - Recommend following an anti-inflammatory diet, which is beneficial for both prediabetes and thyroid conditions.    HYPERTENSION:  - Monitored blood pressure, which has been borderline at 130 for the last few years, not requiring medication yet.  - Explained factors contributing to elevated BP, including age, genetics, weight, lack of exercise, and dietary sodium intake.  - Advised on  lifestyle changes to manage blood pressure through diet and exercise.  - Encouraged patient to continue with current weight loss efforts and healthy diet maintenance.    SURGICAL ABSENCE OF REPRODUCTIVE ORGANS:  - Documented that patient has no female reproductive organs due to complete surgical removal, including ovaries.    OVARIAN NEOPLASM:  - Noted a tumor on the outside of the ovary was found by pathology post-surgery, not seen during the procedure.  - Ordered mammogram.  - Scheduled post-operative follow-up with Dr. Boston to monitor for cancer recurrence.    FOLLOW-UP AND PREVENTIVE CARE:  - Discussed pneumonia vaccination recommendation for patients 50 and older, clarifying it is currently a one-time vaccination unlike annual flu vaccines.  - Explained the difference between viral and bacterial pneumonia.  - Radha to contact the office if deciding to get the pneumonia shot before next year's visit.         1. Wellness examination    2. Visit for screening mammogram  -     Mammo Digital Screening Bilat w/ Brody (XPD); Future; Expected date: 08/04/2025    3. Acquired hypothyroidism  Comments:  -TSH low  - dosage changed  Overview:  Has gland     Orders:  -     SYNTHROID 150 mcg tablet; Take 1 tablet (150 mcg total) by mouth before breakfast.  Dispense: 90 tablet; Refill: 3  -     TSH; Future; Expected date: 08/04/2025  -     Thydoid Peroxidase Antibody; Future; Expected date: 08/04/2025    4. Prediabetes         Discussed how to stay healthy including: diet, exercise, refraining from smoking and discussed screening exams / tests needed for age, sex and family Hx.    RTC 6 mo    Jose Brito MD    This note was generated with the assistance of ambient listening technology. Verbal consent was obtained by the patient and accompanying visitor(s) for the recording of patient appointment to facilitate this note. I attest to having reviewed and edited the generated note for accuracy, though some syntax or  spelling errors may persist. Please contact the author of this note for any clarification.         [1]   Current Outpatient Medications on File Prior to Visit   Medication Sig Dispense Refill    acetaminophen (TYLENOL) 650 MG TbSR Take 1 tablet (650 mg total) by mouth every 6 (six) hours as needed. 60 tablet 1    ibuprofen (ADVIL,MOTRIN) 600 MG tablet Take 1 tablet (600 mg total) by mouth every 6 (six) hours as needed. 60 tablet 1    oxyCODONE (ROXICODONE) 5 MG immediate release tablet Take 1 tablet (5 mg total) by mouth every 4 (four) hours as needed for Pain. 10 tablet 0    senna-docusate 8.6-50 mg (PERICOLACE) 8.6-50 mg per tablet Take 1 tablet by mouth daily as needed. 30 tablet 1     No current facility-administered medications on file prior to visit.

## 2025-08-18 PROBLEM — Z86.03 HISTORY OF NEOPLASM OF UNCERTAIN BEHAVIOR: Status: ACTIVE | Noted: 2025-01-15

## 2025-08-21 ENCOUNTER — TELEPHONE (OUTPATIENT)
Dept: GYNECOLOGIC ONCOLOGY | Facility: CLINIC | Age: 60
End: 2025-08-21
Payer: COMMERCIAL

## 2025-08-28 ENCOUNTER — OFFICE VISIT (OUTPATIENT)
Dept: GYNECOLOGIC ONCOLOGY | Facility: CLINIC | Age: 60
End: 2025-08-28
Payer: COMMERCIAL

## 2025-08-28 VITALS
DIASTOLIC BLOOD PRESSURE: 69 MMHG | HEART RATE: 97 BPM | SYSTOLIC BLOOD PRESSURE: 134 MMHG | HEIGHT: 67 IN | WEIGHT: 293 LBS | BODY MASS INDEX: 45.99 KG/M2 | RESPIRATION RATE: 18 BRPM

## 2025-08-28 DIAGNOSIS — E66.01 MORBID OBESITY WITH BMI OF 50.0-59.9, ADULT: ICD-10-CM

## 2025-08-28 DIAGNOSIS — Z86.03: Primary | ICD-10-CM

## 2025-08-28 PROCEDURE — 99999 PR PBB SHADOW E&M-EST. PATIENT-LVL III: CPT | Mod: PBBFAC,,, | Performed by: STUDENT IN AN ORGANIZED HEALTH CARE EDUCATION/TRAINING PROGRAM

## 2025-09-02 ENCOUNTER — TELEPHONE (OUTPATIENT)
Dept: BARIATRICS | Facility: CLINIC | Age: 60
End: 2025-09-02
Payer: COMMERCIAL

## (undated) DEVICE — DRESSING TELFA N ADH 3X8

## (undated) DEVICE — SEAL CANN UNIVERSAL 5-12MM

## (undated) DEVICE — MANIPULATOR VCARE PLUS 37MM LG

## (undated) DEVICE — SEE MEDLINE ITEM 154981

## (undated) DEVICE — DEVICE MYOSURE REACH SYS

## (undated) DEVICE — DEVICE TRUECLEAR INCISOR 2.9

## (undated) DEVICE — SOL ELECTROLUBE ANTI-STIC

## (undated) DEVICE — GOWN POLY REINF BRTH SLV XL

## (undated) DEVICE — DRAPE SURGICAL STERI IRRG PCH

## (undated) DEVICE — GLOVE SENSICARE PI SURG 7.5

## (undated) DEVICE — POWDER ARISTA AH 3G

## (undated) DEVICE — INSERT CUSHIONPRONE VIEW LARGE

## (undated) DEVICE — OS LOCATOR

## (undated) DEVICE — DRAPE UNDERBUTTOCKS PCH STRL

## (undated) DEVICE — APPLICATOR ARISTA FLEX XL

## (undated) DEVICE — TOWEL OR XRAY BLUE 17X26IN

## (undated) DEVICE — GLOVE BIOGEL SKINSENSE PI 6.0

## (undated) DEVICE — CATH URETHRAL 16FR RED

## (undated) DEVICE — GOWN POLY REINF BRTH SLV LG

## (undated) DEVICE — SOL IRRI STRL WATER 1000ML

## (undated) DEVICE — PANTIES FEMININE NAPKIN LG/XLG

## (undated) DEVICE — CATH URETHRAL RED 16FR

## (undated) DEVICE — GOWN ECLIPSE REINF LV4 XLNG XL

## (undated) DEVICE — SEE MEDLINE ITEM 157116

## (undated) DEVICE — SYS SEE SHARP SCP ANTIFG LNG

## (undated) DEVICE — TOWEL OR DISP STRL BLUE 4/PK

## (undated) DEVICE — GOWN NONREINF SET-IN SLV 2XL

## (undated) DEVICE — PAD CNTOUR SUP-ABSRB POSTPRTM

## (undated) DEVICE — COVER TIP CURVED SCISSORS XI

## (undated) DEVICE — ADHESIVE DERMABOND ADVANCED

## (undated) DEVICE — PAD PREP 50/CA

## (undated) DEVICE — SOL POVIDONE PREP IODINE 4 OZ

## (undated) DEVICE — NDL INSUFFLATION VERRES 120MM

## (undated) DEVICE — TRAY DO THE ROBOT

## (undated) DEVICE — GLOVE SENSICARE PI GRN 6.5

## (undated) DEVICE — PACK FLUENT DISPOSABLE

## (undated) DEVICE — SYR 50ML CATH TIP

## (undated) DEVICE — SOL POVIDONE SCRUB IODINE 4 OZ

## (undated) DEVICE — DRAPE ARM DAVINCI XI

## (undated) DEVICE — SUT MCRYL PLUS 4-0 PS2 27IN

## (undated) DEVICE — CONTAINERS 32OZ

## (undated) DEVICE — SYR 10CC LUER LOCK

## (undated) DEVICE — KIT HYSTEROSCOPIC TRUCLEAR

## (undated) DEVICE — SEE MEDLINE ITEM 152622

## (undated) DEVICE — OBTURATOR BLADELESS 8MM XI CLR

## (undated) DEVICE — GLOVE SURG BIOGEL LATEX SZ 7.5

## (undated) DEVICE — ELECTRODE REM PLYHSV RETURN 9

## (undated) DEVICE — SUT V-LOC 90 GS22 2-0 VIO 23CM

## (undated) DEVICE — SOL NORMAL USPCA 0.9%

## (undated) DEVICE — GLOVE SENSICARE PI SURG 6.5

## (undated) DEVICE — SET TUBE DAVINCI 5 HI FLOW INS

## (undated) DEVICE — COVER OVERHEAD SURG LT BLUE

## (undated) DEVICE — SEE MEDLINE ITEM 157117

## (undated) DEVICE — MANIFOLD 4 PORT

## (undated) DEVICE — IRRIGATOR ENDOSCOPY DISP.

## (undated) DEVICE — DEVICE SNAPSECURE FOL CATH

## (undated) DEVICE — JELLY SURGILUBE 5GR

## (undated) DEVICE — Device

## (undated) DEVICE — SEE MEDLINE ITEM 146355

## (undated) DEVICE — PACK SURGERY START

## (undated) DEVICE — BAG INZII TISS RETRV 12/15MM

## (undated) DEVICE — GLOVE SENSICARE PI GRN 7

## (undated) DEVICE — TROCAR ENDOPATH XCEL 5X100MM

## (undated) DEVICE — SEE MEDLINE ITEM 156955

## (undated) DEVICE — GLOVE SENSICARE PI SURG 6

## (undated) DEVICE — KIT WING PAD POSITIONING

## (undated) DEVICE — PAD PREP CUFFED NS 24X48IN

## (undated) DEVICE — CURETTE ENDOMETRIAL PIPELLE

## (undated) DEVICE — GLOVE SENSICARE PI GRN 8

## (undated) DEVICE — DRAPE COLUMN DAVINCI XI

## (undated) DEVICE — SEAL LENS SCOPE MYOSURE